# Patient Record
Sex: FEMALE | Race: WHITE | NOT HISPANIC OR LATINO | Employment: OTHER | URBAN - METROPOLITAN AREA
[De-identification: names, ages, dates, MRNs, and addresses within clinical notes are randomized per-mention and may not be internally consistent; named-entity substitution may affect disease eponyms.]

---

## 2017-01-26 ENCOUNTER — RADIANT APPOINTMENT (OUTPATIENT)
Dept: MAMMOGRAPHY | Facility: CLINIC | Age: 62
End: 2017-01-26
Attending: FAMILY MEDICINE
Payer: COMMERCIAL

## 2017-01-26 DIAGNOSIS — Z12.31 VISIT FOR SCREENING MAMMOGRAM: ICD-10-CM

## 2017-01-26 PROCEDURE — G0202 SCR MAMMO BI INCL CAD: HCPCS | Mod: TC

## 2017-06-07 ENCOUNTER — OFFICE VISIT (OUTPATIENT)
Dept: FAMILY MEDICINE | Facility: CLINIC | Age: 62
End: 2017-06-07
Payer: COMMERCIAL

## 2017-06-07 VITALS
SYSTOLIC BLOOD PRESSURE: 108 MMHG | HEIGHT: 68 IN | DIASTOLIC BLOOD PRESSURE: 64 MMHG | HEART RATE: 60 BPM | TEMPERATURE: 98.4 F | WEIGHT: 144 LBS | BODY MASS INDEX: 21.82 KG/M2

## 2017-06-07 DIAGNOSIS — C51.9 VULVAR CANCER (H): ICD-10-CM

## 2017-06-07 DIAGNOSIS — J01.90 ACUTE SINUSITIS WITH SYMPTOMS > 10 DAYS: ICD-10-CM

## 2017-06-07 DIAGNOSIS — J02.9 VIRAL PHARYNGITIS: Primary | ICD-10-CM

## 2017-06-07 LAB
DEPRECATED S PYO AG THROAT QL EIA: NORMAL
MICRO REPORT STATUS: NORMAL
SPECIMEN SOURCE: NORMAL

## 2017-06-07 PROCEDURE — 87880 STREP A ASSAY W/OPTIC: CPT | Performed by: FAMILY MEDICINE

## 2017-06-07 PROCEDURE — 87081 CULTURE SCREEN ONLY: CPT | Performed by: FAMILY MEDICINE

## 2017-06-07 PROCEDURE — 99213 OFFICE O/P EST LOW 20 MIN: CPT | Performed by: FAMILY MEDICINE

## 2017-06-07 NOTE — LETTER
[facility]  [address] [address]  Phone: [phone]    June 8, 2017    Carlachris Hong  [address] [address]              Dear Ms. Hong,      The results of your recent throat culture were negative. If you have any further questions or concerns please contact the clinic.            Sincerely,      Raquel Pablo MD

## 2017-06-07 NOTE — PROGRESS NOTES
"  SUBJECTIVE:                                                    Carla Hong is a 62 year old female who presents to clinic today for the following health issues:      Acute Illness   Acute illness concerns: sinus pressure  Onset: 5 days    Fever: no    Chills/Sweats: no    Headache (location?): YES    Sinus Pressure:YES- tender, reddened, post-nasal drainage, facial pain and teeth pain    Conjunctivitis:  no    Ear Pain: no    Rhinorrhea: YES    Congestion: YES    Sore Throat: YES     Cough: no    Wheeze: no    Decreased Appetite: no    Nausea: no    Vomiting: no    Diarrhea:  no    Dysuria/Freq.: no    Fatigue/Achiness: YES- fatigue    Sick/Strep Exposure: YES- exposure to strep and mono     Therapies Tried and outcome: dayquil        ROS:  Constitutional, HEENT, cardiovascular, pulmonary, gi and gu systems are negative, except as otherwise noted.    This document serves as a record of the services and decisions personally performed and made by Raquel Pablo MD. It was created on his behalf by Yoan Kaplan, a trained medical scribe. The creation of this document is based the provider's statements to the medical scribe.  Yoan Kaplan 3:00 PM June 7, 2017      OBJECTIVE:                                                    /64  Pulse 60  Temp 98.4  F (36.9  C) (Tympanic)  Ht 5' 8\" (1.727 m)  Wt 144 lb (65.3 kg)  LMP 03/13/2007  BMI 21.9 kg/m2  Body mass index is 21.9 kg/(m^2).       GENERAL: sick, alert and no distress  EYES: Eyes grossly normal to inspection, conjunctivae and sclerae normal  HENT: ear canals and TM's normal, nose and mouth without ulcers or lesions  RESP: lungs clear to auscultation - no rales, rhonchi or wheezes  CV: regular rate and rhythm, normal S1 S2, no murmur  MS: no gross musculoskeletal defects noted, no edema        Results for orders placed or performed in visit on 06/07/17   Strep, Rapid Screen   Result Value Ref Range    Specimen Description Throat     Rapid Strep " A Screen       NEGATIVE: No Group A streptococcal antigen detected by immunoassay, await   culture report.      Micro Report Status FINAL 06/07/2017           ASSESSMENT/PLAN:                                                      (J02.9) Viral pharyngitis  (primary encounter diagnosis)  Comment: Rapid was negative. Awaiting culture results.  Plan: Strep, Rapid Screen, Beta strep group A culture            (J01.90) Acute sinusitis with symptoms > 10 days  Comment: Hx of sinusitis. Will treat with antibiotics.   Plan: amoxicillin-clavulanate (AUGMENTIN) 875-125 MG         per tablet    (C51.9) Vulvar cancer (H)  Comment: Appears resolved.   Plan: Followed by OB/Gyn        There are no Patient Instructions on file for this visit.      Patient will follow up if symptoms worsen or do not improve. Patient instructed to call with any questions or concerns.    The information in this document, created by a scribe for me, accurately reflects the services I personally performed and the decisions made by me. I have reviewed and approved this document for accuracy. 3:01 PM 6/7/2017    Raquel Pablo MD  [facility]

## 2017-06-07 NOTE — MR AVS SNAPSHOT
"              After Visit Summary   6/7/2017    Carla Hong    MRN: [id number]           Patient Information     Date Of Birth          1955        Visit Information        Provider Department      6/7/2017 3:00 PM Raquel Pablo MD [facility]        Today's Diagnoses     Viral pharyngitis    -  1    Acute sinusitis with symptoms > 10 days        Vulvar cancer (H)           Follow-ups after your visit        Who to contact     Normal or non-critical lab and imaging results will be communicated to you by UIBLUEPRINThart, letter or phone within 4 business days after the clinic has received the results. If you do not hear from us within 7 days, please contact the clinic through Kitchont or phone. If you have a critical or abnormal lab result, we will notify you by phone as soon as possible.  Submit refill requests through [employer] or call your pharmacy and they will forward the refill request to us. Please allow 3 business days for your refill to be completed.          If you need to speak with a  for additional information , please call: [phone]           Additional Information About Your Visit        UIBLUEPRINTharLono Information     The Do[employer] gives you secure access to your electronic health record. If you see a primary care provider, you can also send messages to your care team and make appointments. If you have questions, please call your primary care clinic.  If you do not have a primary care provider, please call [phone] and they will assist you.        Care EveryWhere ID     This is your Care EveryWhere ID. This could be used by other organizations to access your [place] medical records  [id number]        Your Vitals Were     Pulse Temperature Height Last Period BMI (Body Mass Index)       60 98.4  F (36.9  C) (Tympanic) 5' 8\" (1.727 m) 03/13/2007 21.9 kg/m2        Blood Pressure from Last 3 Encounters:   06/07/17 108/64   12/12/16 135/86   12/09/16 102/60    Weight from " Last 3 Encounters:   06/07/17 144 lb (65.3 kg)   12/12/16 140 lb (63.5 kg)   12/12/16 141 lb (64 kg)              We Performed the Following     Beta strep group A culture     Strep, Rapid Screen          Today's Medication Changes          These changes are accurate as of: 6/7/17 11:59 PM.  If you have any questions, ask your nurse or doctor.               Start taking these medicines.        Dose/Directions    amoxicillin-clavulanate 875-125 MG per tablet   Commonly known as:  AUGMENTIN   Used for:  Acute sinusitis with symptoms > 10 days   Started by:  Raquel Pablo MD        Dose:  1 tablet   Take 1 tablet by mouth 2 times daily for 7 days   Quantity:  14 tablet   Refills:  0            Where to get your medicines      These medications were sent to [facility] [address] [facility] [address] [facility]  [address] [place] [address]     Phone:  [phone]     amoxicillin-clavulanate 875-125 MG per tablet                Primary Care Provider Office Phone # Fax #    Raquel Pablo [phone]       [facility] [address] [facility] [address]        Thank you!     Thank you for choosing [facility]  for your care. Our goal is always to provide you with excellent care. Hearing back from our patients is one way we can continue to improve our services. Please take a few minutes to complete the written survey that you may receive in the mail after your visit with us. Thank you!             Your Updated Medication List - Protect others around you: Learn how to safely use, store and throw away your medicines at www.disposemymeds.org.          This list is accurate as of: 6/7/17 11:59 PM.  Always use your most recent med list.                   Brand Name Dispense Instructions for use    amoxicillin-clavulanate 875-125 MG per tablet    AUGMENTIN    14 tablet    Take 1 tablet by mouth 2 times daily for 7 days       CALCIUM + D PO      1  daily       DAILY MULTIVITAMIN PO      Take 1 tablet by mouth daily.       MELATONIN PO      Take 10 mg by mouth

## 2017-06-08 LAB
BACTERIA SPEC CULT: NORMAL
MICRO REPORT STATUS: NORMAL
SPECIMEN SOURCE: NORMAL

## 2017-07-08 ENCOUNTER — HEALTH MAINTENANCE LETTER (OUTPATIENT)
Age: 62
End: 2017-07-08

## 2017-08-12 ENCOUNTER — HEALTH MAINTENANCE LETTER (OUTPATIENT)
Age: 62
End: 2017-08-12

## 2017-10-14 ENCOUNTER — HEALTH MAINTENANCE LETTER (OUTPATIENT)
Age: 62
End: 2017-10-14

## 2017-12-13 ENCOUNTER — OFFICE VISIT (OUTPATIENT)
Dept: FAMILY MEDICINE | Facility: CLINIC | Age: 62
End: 2017-12-13
Payer: COMMERCIAL

## 2017-12-13 VITALS
HEIGHT: 68 IN | HEART RATE: 65 BPM | OXYGEN SATURATION: 97 % | BODY MASS INDEX: 21.98 KG/M2 | TEMPERATURE: 98 F | DIASTOLIC BLOOD PRESSURE: 65 MMHG | WEIGHT: 145 LBS | SYSTOLIC BLOOD PRESSURE: 90 MMHG

## 2017-12-13 DIAGNOSIS — J01.90 ACUTE SINUSITIS WITH SYMPTOMS > 10 DAYS: Primary | ICD-10-CM

## 2017-12-13 DIAGNOSIS — H61.21 IMPACTED CERUMEN OF RIGHT EAR: ICD-10-CM

## 2017-12-13 PROCEDURE — 99214 OFFICE O/P EST MOD 30 MIN: CPT | Performed by: PHYSICIAN ASSISTANT

## 2017-12-13 RX ORDER — FLUTICASONE PROPIONATE 50 MCG
1-2 SPRAY, SUSPENSION (ML) NASAL DAILY
Qty: 1 BOTTLE | Refills: 0 | Status: SHIPPED | OUTPATIENT
Start: 2017-12-13 | End: 2018-11-26

## 2017-12-13 RX ORDER — OXYMETAZOLINE HYDROCHLORIDE 0.05 G/100ML
2 SPRAY NASAL 2 TIMES DAILY
Qty: 1 BOTTLE | Refills: 0 | Status: SHIPPED | OUTPATIENT
Start: 2017-12-13 | End: 2018-11-26

## 2017-12-13 RX ORDER — LEVOFLOXACIN 750 MG/1
750 TABLET, FILM COATED ORAL DAILY
Qty: 5 TABLET | Refills: 0 | Status: SHIPPED | OUTPATIENT
Start: 2017-12-13 | End: 2018-11-26

## 2017-12-13 NOTE — MR AVS SNAPSHOT
After Visit Summary   12/13/2017    Carla Hong    MRN: [id number]           Patient Information     Date Of Birth          1955        Visit Information        Provider Department      12/13/2017 1:40 PM Tamar Dimas PA-C [facility]        Today's Diagnoses     Acute sinusitis with symptoms > 10 days    -  1    Impacted cerumen of right ear           Follow-ups after your visit        Your next 10 appointments already scheduled     Jan 05, 2018  2:00 PM CST   (Arrive by 1:45 PM)   Return Visit with Henrry Granger MD   [facility]    [address]  [address]nd Floor  [place] [address] [phone]              Who to contact     Normal or non-critical lab and imaging results will be communicated to you by MyChart, letter or phone within 4 business days after the clinic has received the results. If you do not hear from us within 7 days, please contact the clinic through MyChart or phone. If you have a critical or abnormal lab result, we will notify you by phone as soon as possible.  Submit refill requests through Couple or call your pharmacy and they will forward the refill request to us. Please allow 3 business days for your refill to be completed.          If you need to speak with a  for additional information , please call: [phone]           Additional Information About Your Visit        Orchid Softwarehart Information     Couple gives you secure access to your electronic health record. If you see a primary care provider, you can also send messages to your care team and make appointments. If you have questions, please call your primary care clinic.  If you do not have a primary care provider, please call [phone] and they will assist you.        Care EveryWhere ID     This is your Care EveryWhere ID. This could be used by other organizations to access your [facility]  "records  [id number]        Your Vitals Were     Pulse Temperature Height Last Period Pulse Oximetry Breastfeeding?    65 98  F (36.7  C) (Tympanic) 5' 8\" (1.727 m) 03/13/2007 97% No    BMI (Body Mass Index)                   22.05 kg/m2            Blood Pressure from Last 3 Encounters:   12/13/17 90/65   06/07/17 108/64   12/12/16 135/86    Weight from Last 3 Encounters:   12/13/17 145 lb (65.8 kg)   06/07/17 144 lb (65.3 kg)   12/12/16 140 lb (63.5 kg)              Today, you had the following     No orders found for display         Today's Medication Changes          These changes are accurate as of: 12/13/17  1:54 PM.  If you have any questions, ask your nurse or doctor.               Start taking these medicines.        Dose/Directions    fluticasone 50 MCG/ACT spray   Commonly known as:  FLONASE   Used for:  Acute sinusitis with symptoms > 10 days   Started by:  Tamar Dimas PA-C        Dose:  1-2 spray   Spray 1-2 sprays into both nostrils daily   Quantity:  1 Bottle   Refills:  0       levofloxacin 750 MG tablet   Commonly known as:  LEVAQUIN   Used for:  Acute sinusitis with symptoms > 10 days   Started by:  Tamar Dimas PA-C        Dose:  750 mg   Take 1 tablet (750 mg) by mouth daily   Quantity:  5 tablet   Refills:  0       oxymetazoline 0.05 % spray   Commonly known as:  AFRIN   Used for:  Acute sinusitis with symptoms > 10 days   Started by:  Tamar Dimas PA-C        Dose:  2 spray   Spray 2 sprays into both nostrils 2 times daily Do not use for more than 3 consecutive days, as you may develop rebound congestion.   Quantity:  1 Bottle   Refills:  0            Where to get your medicines      These medications were sent to [facility] [address]  [address] [facility] [address]     Phone:  [phone]     fluticasone 50 MCG/ACT spray    levofloxacin 750 MG tablet    oxymetazoline 0.05 % spray                Primary Care " Provider Office Phone # Fax #    Raquel Pablo [phone] [address] [place] [facility] [address]        Equal Access to Services     KARTHIK ROCKWELL : Jessi olaf middleton sallie Glass, wabebada luqyesika, qaybta kaalmada carlos, krystian matos lageovannamary narvaez. So [facility] [phone].    ATENCIÓN: Si habla español, tiene a redd disposición servicios gratuitos de asistencia lingüística. Llame al [phone].    We comply with applicable federal civil rights laws and [place] laws. We do not discriminate on the basis of race, color, national origin, age, disability, sex, sexual orientation, or gender identity.            Thank you!     Thank you for choosing [facility]  for your care. Our goal is always to provide you with excellent care. Hearing back from our patients is one way we can continue to improve our services. Please take a few minutes to complete the written survey that you may receive in the mail after your visit with us. Thank you!             Your Updated Medication List - Protect others around you: Learn how to safely use, store and throw away your medicines at www.disposemymeds.org.          This list is accurate as of: 12/13/17  1:54 PM.  Always use your most recent med list.                   Brand Name Dispense Instructions for use Diagnosis    CALCIUM + D PO      1 daily        DAILY MULTIVITAMIN PO      Take 1 tablet by mouth daily.        fluticasone 50 MCG/ACT spray    FLONASE    1 Bottle    Spray 1-2 sprays into both nostrils daily    Acute sinusitis with symptoms > 10 days       levofloxacin 750 MG tablet    LEVAQUIN    5 tablet    Take 1 tablet (750 mg) by mouth daily    Acute sinusitis with symptoms > 10 days       MELATONIN PO      Take 10 mg by mouth        oxymetazoline 0.05 % spray    AFRIN    1 Bottle    Spray 2 sprays into both nostrils 2 times daily Do not use for more than 3 consecutive days, as you may develop rebound congestion.    Acute  sinusitis with symptoms > 10 days

## 2017-12-13 NOTE — NURSING NOTE
"Chief Complaint   Patient presents with     Sinus Problem       Initial BP 90/65  Pulse 65  Temp 98  F (36.7  C) (Tympanic)  Ht 5' 8\" (1.727 m)  Wt 145 lb (65.8 kg)  LMP 03/13/2007  SpO2 97%  Breastfeeding? No  BMI 22.05 kg/m2 Estimated body mass index is 22.05 kg/(m^2) as calculated from the following:    Height as of this encounter: 5' 8\" (1.727 m).    Weight as of this encounter: 145 lb (65.8 kg).  Medication Reconciliation: complete     Judy Jorge MA      "

## 2017-12-13 NOTE — PROGRESS NOTES
SUBJECTIVE:   Carla Hong is a 62 year old female who presents to clinic today for the following health issues:      ENT Symptoms             Symptoms: cc Present Absent Comment   Fever/Chills   x    Fatigue  x     Muscle Aches  x     Eye Irritation  x     Sneezing  x     Nasal Maximiliano/Drg  x     Sinus Pressure/Pain  x     Loss of smell   x    Dental pain  x     Sore Throat  x     Swollen Glands   x    Ear Pain/Fullness   x    Cough  x     Wheeze  x     Chest Pain   x    Shortness of breath  x     Rash   x    Other   x      Symptom duration:  3 weeks    Symptom severity:  worsening.    Treatments tried:  Augmentin, claritin    Contacts:   is ill        Finished full augmentin course about 5 days ago.  Symptoms are now returning and she c/o sinus pressure and POST NASAL DRIP.  Has tried hot showers and vaporizers.    She has tried flonase a few times, nothing consistent.  Pain is in frontal sinuses and pain in upper jaw.              Problem list and histories reviewed & adjusted, as indicated.  Additional history: as documented    Patient Active Problem List   Diagnosis     Chronic rhinitis     CARDIOVASCULAR SCREENING; LDL GOAL LESS THAN 160     Dysphonia     Other diseases of vocal cords     Adjustment disorder with depressed mood     Vulvar lump     Postmenopausal bleeding     Annual physical exam     Vulvar cancer (H)     Cellulitis of groin, left     Cellulitis     Ptosis of breast     History of abnormal Pap smear     Advanced directives, counseling/discussion     Past Surgical History:   Procedure Laterality Date     COLONOSCOPY  12/6/2010    COLONOSCOPY performed by RYAN OSEGUERA at [facility]     VULVECTOMY RADICAL DISSECT GROIN(S)  11/16/2012    Procedure: VULVECTOMY RADICAL DISSECT GROIN(S);  Radical Vulvectomy Bilateral Inginal Femoral Lymph Node Dissection.;  Surgeon: Henrry Granger MD;  Location:  OR       Social History   Substance Use Topics     Smoking status: Never Smoker      "Smokeless tobacco: Never Used     Alcohol use Yes      Comment: 1-2xweek     Family History   Problem Relation Age of Onset     DIABETES Maternal Grandmother      Hypertension No family hx of      CEREBROVASCULAR DISEASE No family hx of      Breast Cancer No family hx of      Cancer - colorectal No family hx of      Prostate Cancer No family hx of      C.A.D. No family hx of      CANCER Son      brain cancer/germinoma     CANCER Mother      renal cell carcinoma         Current Outpatient Prescriptions   Medication Sig Dispense Refill     levofloxacin (LEVAQUIN) 750 MG tablet Take 1 tablet (750 mg) by mouth daily 5 tablet 0     oxymetazoline (AFRIN) 0.05 % spray Spray 2 sprays into both nostrils 2 times daily Do not use for more than 3 consecutive days, as you may develop rebound congestion. 1 Bottle 0     fluticasone (FLONASE) 50 MCG/ACT spray Spray 1-2 sprays into both nostrils daily 1 Bottle 0     MELATONIN PO Take 10 mg by mouth       Multiple Vitamin (DAILY MULTIVITAMIN PO) Take 1 tablet by mouth daily.       CALCIUM + D PO 1 daily       BP Readings from Last 3 Encounters:   12/13/17 90/65   06/07/17 108/64   12/12/16 135/86    Wt Readings from Last 3 Encounters:   12/13/17 145 lb (65.8 kg)   06/07/17 144 lb (65.3 kg)   12/12/16 140 lb (63.5 kg)                        Reviewed and updated as needed this visit by clinical staff     Reviewed and updated as needed this visit by Provider         ROS:  Constitutional, HEENT, cardiovascular, pulmonary, GI, , musculoskeletal, neuro, skin, endocrine and psych systems are negative, except as otherwise noted.      OBJECTIVE:   BP 90/65  Pulse 65  Temp 98  F (36.7  C) (Tympanic)  Ht 5' 8\" (1.727 m)  Wt 145 lb (65.8 kg)  LMP 03/13/2007  SpO2 97%  Breastfeeding? No  BMI 22.05 kg/m2  Body mass index is 22.05 kg/(m^2).  GENERAL: healthy, alert and no distress  EYES: Eyes grossly normal to inspection, PERRL and conjunctivae and sclerae normal  HENT: ear canal on right " with impacted cerumen, left clear and TM's normal, nose and mouth without ulcers or lesions  NECK: no adenopathy, no asymmetry, masses, or scars and thyroid normal to palpation  RESP: lungs clear to auscultation - no rales, rhonchi or wheezes  CV: regular rate and rhythm, normal S1 S2, no S3 or S4, no murmur, click or rub, no peripheral edema and peripheral pulses strong  ABDOMEN: soft, nontender, no hepatosplenomegaly, no masses and bowel sounds normal  MS: no gross musculoskeletal defects noted, no edema    Diagnostic Test Results:  none     ASSESSMENT/PLAN:       1. Acute sinusitis with symptoms > 10 days  SINUSITIS    * Antibiotics completed, contingency script given to start if sinus pressure does not improve with flonase and afrin.     * I discussed the pathophysiology of sinus pressure/sinusitis and treatment options with emphasis on healthy lifestyle and opening up natural drainage passages.  I discussed the risks and benefits of various over the counter and prescription treatments including short courses of decongestant nasal sprays.  If new, worsening or persistent symptoms, the patient is to call or return for a recheck.        - levofloxacin (LEVAQUIN) 750 MG tablet; Take 1 tablet (750 mg) by mouth daily  Dispense: 5 tablet; Refill: 0  - oxymetazoline (AFRIN) 0.05 % spray; Spray 2 sprays into both nostrils 2 times daily Do not use for more than 3 consecutive days, as you may develop rebound congestion.  Dispense: 1 Bottle; Refill: 0  - fluticasone (FLONASE) 50 MCG/ACT spray; Spray 1-2 sprays into both nostrils daily  Dispense: 1 Bottle; Refill: 0    2. Impacted cerumen of right ear  Cerumen Impaction    right ear irrigated however unable to remove wax.  Curette then used to try and remove, still unable to reach it.      Tympanic membranes intact s/p irrigation.   Can use OTC debrox to right ear and f/u for another irrigation next week once wax is softened.                FUTURE APPOINTMENTS:       -  Follow-up visit if symptoms worsen or fail to improve as anticipated.     Tamar Dimas PA-C  [facility]

## 2018-01-05 ENCOUNTER — ONCOLOGY VISIT (OUTPATIENT)
Dept: ONCOLOGY | Facility: CLINIC | Age: 63
End: 2018-01-05
Attending: OBSTETRICS & GYNECOLOGY
Payer: COMMERCIAL

## 2018-01-05 VITALS
DIASTOLIC BLOOD PRESSURE: 77 MMHG | WEIGHT: 145 LBS | RESPIRATION RATE: 17 BRPM | BODY MASS INDEX: 21.98 KG/M2 | SYSTOLIC BLOOD PRESSURE: 112 MMHG | TEMPERATURE: 98.2 F | HEIGHT: 68 IN | OXYGEN SATURATION: 97 % | HEART RATE: 61 BPM

## 2018-01-05 DIAGNOSIS — L03.314 CELLULITIS OF GROIN: Primary | ICD-10-CM

## 2018-01-05 DIAGNOSIS — C51.9 VULVAR CANCER (H): ICD-10-CM

## 2018-01-05 PROCEDURE — 99213 OFFICE O/P EST LOW 20 MIN: CPT | Mod: ZP | Performed by: OBSTETRICS & GYNECOLOGY

## 2018-01-05 PROCEDURE — G0463 HOSPITAL OUTPT CLINIC VISIT: HCPCS | Mod: ZF

## 2018-01-05 RX ORDER — CEPHALEXIN 500 MG/1
500 CAPSULE ORAL 4 TIMES DAILY
Qty: 40 CAPSULE | Refills: 3 | Status: SHIPPED | OUTPATIENT
Start: 2018-01-05 | End: 2018-11-26

## 2018-01-05 ASSESSMENT — PAIN SCALES - GENERAL: PAINLEVEL: NO PAIN (0)

## 2018-01-05 NOTE — MR AVS SNAPSHOT
"              After Visit Summary   1/5/2018    Carla Hong    MRN: [id number]           Patient Information     Date Of Birth          1955        Visit Information        Provider Department      1/5/2018 2:00 PM Henrry Granger MD [facility]        Today's Diagnoses     Cellulitis of groin    -  1    Vulvar cancer (H)           Follow-ups after your visit        Who to contact     If you have questions or need follow up information about today's clinic visit or your schedule please contact [facility] directly at [phone].  Normal or non-critical lab and imaging results will be communicated to you by "[employer]hart, letter or phone within 4 business days after the clinic has received the results. If you do not hear from us within 7 days, please contact the clinic through [employer] or phone. If you have a critical or abnormal lab result, we will notify you by phone as soon as possible.  Submit refill requests through [employer] or call your pharmacy and they will forward the refill request to us. Please allow 3 business days for your refill to be completed.          Additional Information About Your Visit        MyChart Information     [employer] gives you secure access to your electronic health record. If you see a primary care provider, you can also send messages to your care team and make appointments. If you have questions, please call your primary care clinic.  If you do not have a primary care provider, please call [phone] and they will assist you.        Care EveryWhere ID     This is your Care EveryWhere ID. This could be used by other organizations to access your [place] medical records  [id number]        Your Vitals Were     Pulse Temperature Respirations Height Last Period Pulse Oximetry    61 98.2  F (36.8  C) (Oral) 17 1.727 m (5' 8\") 03/13/2007 97%    Breastfeeding? BMI (Body Mass Index)                No 22.05 kg/m2           Blood Pressure from Last 3 " Encounters:   01/05/18 112/77   12/13/17 90/65   06/07/17 108/64    Weight from Last 3 Encounters:   01/05/18 65.8 kg (145 lb)   12/13/17 65.8 kg (145 lb)   06/07/17 65.3 kg (144 lb)              Today, you had the following     No orders found for display         Today's Medication Changes          These changes are accurate as of 1/5/18 11:59 PM.  If you have any questions, ask your nurse or doctor.               Start taking these medicines.        Dose/Directions    cephALEXin 500 MG capsule   Commonly known as:  KEFLEX   Used for:  Cellulitis of groin   Started by:  Henrry Granger MD        Dose:  500 mg   Take 1 capsule (500 mg) by mouth 4 times daily   Quantity:  40 capsule   Refills:  3            Where to get your medicines      These medications were sent to [facility] [address]  [address] [address]     Phone:  [phone]     cephALEXin 500 MG capsule                Primary Care Provider Office Phone # Fax #    Raquel Pablo [phone] [address] [place] [facility] [address]        Equal Access to Services     [facility] AH: Hadii aad kane hadasho Soeugenio, waaxda luqadaha, qaybta kaalmada adeegyada, krystian narvaez. So [facility] [phone].    ATENCIÓN: Si habla español, tiene a redd disposición servicios gratuitos de asistencia lingüística. Llame al [phone].    We comply with applicable federal civil rights laws and [place] laws. We do not discriminate on the basis of race, color, national origin, age, disability, sex, sexual orientation, or gender identity.            Thank you!     Thank you for choosing [facility]  for your care. Our goal is always to provide you with excellent care. Hearing back from our patients is one way we can continue to improve our services. Please take a few minutes to complete the written survey that you may receive in the mail after  your visit with us. Thank you!             Your Updated Medication List - Protect others around you: Learn how to safely use, store and throw away your medicines at www.disposemymeds.org.          This list is accurate as of 1/5/18 11:59 PM.  Always use your most recent med list.                   Brand Name Dispense Instructions for use Diagnosis    CALCIUM + D PO      1 daily        cephALEXin 500 MG capsule    KEFLEX    40 capsule    Take 1 capsule (500 mg) by mouth 4 times daily    Cellulitis of groin       DAILY MULTIVITAMIN PO      Take 1 tablet by mouth daily.        fluticasone 50 MCG/ACT spray    FLONASE    1 Bottle    Spray 1-2 sprays into both nostrils daily    Acute sinusitis with symptoms > 10 days       levofloxacin 750 MG tablet    LEVAQUIN    5 tablet    Take 1 tablet (750 mg) by mouth daily    Acute sinusitis with symptoms > 10 days       MELATONIN PO      Take 10 mg by mouth        oxymetazoline 0.05 % spray    AFRIN    1 Bottle    Spray 2 sprays into both nostrils 2 times daily Do not use for more than 3 consecutive days, as you may develop rebound congestion.    Acute sinusitis with symptoms > 10 days

## 2018-01-05 NOTE — LETTER
2018       RE: Carla Hong  [address] [id number]     Dear Colleague,    Thank you for referring your patient, Carla Hong, to the [facility]. Please see a copy of my visit note below.    Gynecologic Oncology Clinic - Follow Up Visit    Date: 2018     RE: Carla Hong  : 1955      Carla Hong, 64 yo, diagnosed with Stage IB vulvar squamous cell carcinoma in . S/p radical vulvectomy and inguinal lymphnode dissection the same year. In the jacob-operative period she developed lymphadenopathy that progressed to groin cellulitis on multiple occasions. Since then she has learned to recognized early signs of infection and has successfully treated episodes with antibiotics outpatient. She presents for end of surveillance visit.  She has no complaints and has had no further episodes of cellulitis.  No new concerns at vulva and no new diagnoses    GYN History  Last Pap Smear:            Result: NILN, She has no history of abnormal Pap smears.  Vulvar biopsy:        Result: lichen sclerosis          Review of Systems:  Systemic  no weight changes; no fever; no chills; no night sweats; no appetite changes  Skin   no rashes, or lesions  Eye   no irritation; no changes in vision  HEENT no dysphagia; no hoarseness   Pulmonary no cough; no shortness of breath  CV  no chest pain; no palpitations  GI  no diarrhea; no abdominal pain; no changes in bowel  habits; no blood in stool    no urinary frequency; no urinary urgency; no dysuria; no pain; no abnormal vaginal discharge;   no abnormal vaginal bleeding  Breast  no breast discharge; no breast changes; no breast pain  MSK  no myalgias; no arthralgias; no back pain  Psychiatric  no depressed mood; no anxiety  Hematologic no tender lymph nodes; no noticeable swellings or lumps   Endocrine  no hot flashes; no heat/cold intolerance       Neurological no tremor; no numbness and tingling; no  headaches; no difficulty sleeping    Past Medical History:  Past Medical History:   Diagnosis Date     Vulvar cancer (H) 11/12       Past Surgical History:  Past Surgical History:   Procedure Laterality Date     COLONOSCOPY  12/6/2010    COLONOSCOPY performed by RYAN OSEGUERA at [facility]     VULVECTOMY RADICAL DISSECT GROIN(S)  11/16/2012    Procedure: VULVECTOMY RADICAL DISSECT GROIN(S);  Radical Vulvectomy Bilateral Inginal Femoral Lymph Node Dissection.;  Surgeon: Henrry Granger MD;  Location:  OR       Health Maintenance:  Health Maintenance Due   Topic Date Due     HEPATITIS C SCREENING  02/07/1974     PAP Q3 MO DIAGNOSTIC  03/12/2017     PAP Q6 MOS DIAGNOSTIC  06/12/2017     INFLUENZA VACCINE (SYSTEM ASSIGNED)  09/01/2017     Current Medications:   Current Outpatient Prescriptions   Medication Sig Dispense Refill     cephALEXin (KEFLEX) 500 MG capsule Take 1 capsule (500 mg) by mouth 4 times daily 40 capsule 3     MELATONIN PO Take 10 mg by mouth       Multiple Vitamin (DAILY MULTIVITAMIN PO) Take 1 tablet by mouth daily.       CALCIUM + D PO 1 daily       levofloxacin (LEVAQUIN) 750 MG tablet Take 1 tablet (750 mg) by mouth daily (Patient not taking: Reported on 1/5/2018) 5 tablet 0     oxymetazoline (AFRIN) 0.05 % spray Spray 2 sprays into both nostrils 2 times daily Do not use for more than 3 consecutive days, as you may develop rebound congestion. (Patient not taking: Reported on 1/5/2018) 1 Bottle 0     fluticasone (FLONASE) 50 MCG/ACT spray Spray 1-2 sprays into both nostrils daily (Patient not taking: Reported on 1/5/2018) 1 Bottle 0       Allergies:   Allergies   Allergen Reactions     Nkda [No Known Drug Allergies]         Social History:     Social History   Substance Use Topics     Smoking status: Never Smoker     Smokeless tobacco: Never Used     Alcohol use Yes      Comment: 1-2xweek       History   Drug Use No       Family History: Notable for  Family History   Problem Relation Age of  "Onset     DIABETES Maternal Grandmother      Hypertension No family hx of      CEREBROVASCULAR DISEASE No family hx of      Breast Cancer No family hx of      Cancer - colorectal No family hx of      Prostate Cancer No family hx of      C.A.D. No family hx of      CANCER Son      brain cancer/germinoma     CANCER Mother      renal cell carcinoma       Physical Exam:   /77  Pulse 61  Temp 98.2  F (36.8  C) (Oral)  Resp 17  Ht 1.727 m (5' 8\")  Wt 65.8 kg (145 lb)  LMP 03/13/2007  SpO2 97%  Breastfeeding? No  BMI 22.05 kg/m2  Body mass index is 22.05 kg/(m^2).     General :  healthy and alert, no distress  Cardiovascular: regular rate and rhythm, no gallops, rubs or murmurs   Respiratory:  lungs clear, no rales, rhonchi or wheezes, normal diaphragmatic excursion  Musculoskeletal: extremities non tender and without edema  Skin:   no lesions or rashes   Neurological:  normal gait, no gross defects  Psychiatric:  appropriate mood and affect                      Hematological: normal cervical, supraclavicular and inguinal lymph nodes  Gastrointestinal:       abdomen soft, non-tender, non-distended, no organomegaly or masses  Genitourinary: Consistent with prior vulvar surgery.  No lesions or ulceration or mass.  Vaginal mucosa is pink and smooth.  Normal bimanual examination without mass.      Assessment: Carla Hong is a 63 year old woman diagnosed with squamous cell carcinoma of the vulva in 2012, s/p radical vulvectomy and inguinal lymphnode dissection. In the perioperative period she developed lymphedema that lead to groin cellulitis.     No evidence of recurrence    Now at end of surveillance and patient will transition to annual examination at our [facility] or local gynecologist.    Continue with antibiotics on hand in case of symptoms of cellulitis.    Henrry Granger Jr., M.D., M.S.  Professor, Gynecologic Oncology  Obstetrics, Gynecology and Women's Health   [employer] [facility] [employer]  Chief Medical Officer  [facility]

## 2018-01-05 NOTE — NURSING NOTE
"Oncology Rooming Note    January 5, 2018 2:02 PM   Carla Hong is a 62 year old female who presents for:    Chief Complaint   Patient presents with     Oncology Clinic Visit     return patient visit for follow up related to squamous cell carcinoma - vulvar     Initial Vitals: /77  Pulse 61  Temp 98.2  F (36.8  C) (Oral)  Resp 17  Ht 1.727 m (5' 8\")  Wt 65.8 kg (145 lb)  LMP 03/13/2007  SpO2 97%  Breastfeeding? No  BMI 22.05 kg/m2 Estimated body mass index is 22.05 kg/(m^2) as calculated from the following:    Height as of this encounter: 1.727 m (5' 8\").    Weight as of this encounter: 65.8 kg (145 lb). Body surface area is 1.78 meters squared.  No Pain (0) Comment: Data Unavailable   Patient's last menstrual period was 03/13/2007.  Allergies reviewed: Yes  Medications reviewed: Yes    Medications: Medication refills not needed today.  Pharmacy name entered into [facility]:    [facility] - [place], [place] - [address] [place] [address] PHARMACY [facility] - [place], [place] - [address] [facility] - [place], [place] - [address] [place] [address] [phone]    Clinical concerns: no concerns dr. larsen was notified.    6 minutes for nursing intake (face to face time)     Magdalena Fitzgerald CMA              "

## 2018-01-20 ENCOUNTER — HEALTH MAINTENANCE LETTER (OUTPATIENT)
Age: 63
End: 2018-01-20

## 2018-02-01 ENCOUNTER — TELEPHONE (OUTPATIENT)
Dept: OBGYN | Facility: CLINIC | Age: 63
End: 2018-02-01

## 2018-02-17 ENCOUNTER — HEALTH MAINTENANCE LETTER (OUTPATIENT)
Age: 63
End: 2018-02-17

## 2018-02-28 NOTE — PROGRESS NOTES
Gynecologic Oncology Clinic - Follow Up Visit    Date: 2018     RE: Carla Hong  : 1955      Carla Hong, 62 yo, diagnosed with Stage IB vulvar squamous cell carcinoma in . S/p radical vulvectomy and inguinal lymphnode dissection the same year. In the jacob-operative period she developed lymphadenopathy that progressed to groin cellulitis on multiple occasions. Since then she has learned to recognized early signs of infection and has successfully treated episodes with antibiotics outpatient. She presents for end of surveillance visit.  She has no complaints and has had no further episodes of cellulitis.  No new concerns at vulva and no new diagnoses    GYN History  Last Pap Smear:            Result: NILN, She has no history of abnormal Pap smears.  Vulvar biopsy:        Result: lichen sclerosis          Review of Systems:  Systemic  no weight changes; no fever; no chills; no night sweats; no appetite changes  Skin   no rashes, or lesions  Eye   no irritation; no changes in vision  HEENT no dysphagia; no hoarseness   Pulmonary no cough; no shortness of breath  CV  no chest pain; no palpitations  GI  no diarrhea; no abdominal pain; no changes in bowel  habits; no blood in stool    no urinary frequency; no urinary urgency; no dysuria; no pain; no abnormal vaginal discharge;   no abnormal vaginal bleeding  Breast  no breast discharge; no breast changes; no breast pain  MSK  no myalgias; no arthralgias; no back pain  Psychiatric  no depressed mood; no anxiety  Hematologic no tender lymph nodes; no noticeable swellings or lumps   Endocrine  no hot flashes; no heat/cold intolerance       Neurological no tremor; no numbness and tingling; no headaches; no difficulty sleeping    Past Medical History:  Past Medical History:   Diagnosis Date     Vulvar cancer (H)        Past Surgical History:  Past Surgical History:   Procedure Laterality Date     COLONOSCOPY  2010    COLONOSCOPY  performed by RYAN OSEGUERA at [facility]     VULVECTOMY RADICAL DISSECT GROIN(S)  11/16/2012    Procedure: VULVECTOMY RADICAL DISSECT GROIN(S);  Radical Vulvectomy Bilateral Inginal Femoral Lymph Node Dissection.;  Surgeon: Henrry Granger MD;  Location:  OR       Health Maintenance:  Health Maintenance Due   Topic Date Due     HEPATITIS C SCREENING  02/07/1974     PAP Q3 MO DIAGNOSTIC  03/12/2017     PAP Q6 MOS DIAGNOSTIC  06/12/2017     INFLUENZA VACCINE (SYSTEM ASSIGNED)  09/01/2017     Current Medications:   Current Outpatient Prescriptions   Medication Sig Dispense Refill     cephALEXin (KEFLEX) 500 MG capsule Take 1 capsule (500 mg) by mouth 4 times daily 40 capsule 3     MELATONIN PO Take 10 mg by mouth       Multiple Vitamin (DAILY MULTIVITAMIN PO) Take 1 tablet by mouth daily.       CALCIUM + D PO 1 daily       levofloxacin (LEVAQUIN) 750 MG tablet Take 1 tablet (750 mg) by mouth daily (Patient not taking: Reported on 1/5/2018) 5 tablet 0     oxymetazoline (AFRIN) 0.05 % spray Spray 2 sprays into both nostrils 2 times daily Do not use for more than 3 consecutive days, as you may develop rebound congestion. (Patient not taking: Reported on 1/5/2018) 1 Bottle 0     fluticasone (FLONASE) 50 MCG/ACT spray Spray 1-2 sprays into both nostrils daily (Patient not taking: Reported on 1/5/2018) 1 Bottle 0       Allergies:   Allergies   Allergen Reactions     Nkda [No Known Drug Allergies]         Social History:     Social History   Substance Use Topics     Smoking status: Never Smoker     Smokeless tobacco: Never Used     Alcohol use Yes      Comment: 1-2xweek       History   Drug Use No       Family History: Notable for  Family History   Problem Relation Age of Onset     DIABETES Maternal Grandmother      Hypertension No family hx of      CEREBROVASCULAR DISEASE No family hx of      Breast Cancer No family hx of      Cancer - colorectal No family hx of      Prostate Cancer No family hx of      C.A.D. No family  "hx of      CANCER Son      brain cancer/germinoma     CANCER Mother      renal cell carcinoma       Physical Exam:   /77  Pulse 61  Temp 98.2  F (36.8  C) (Oral)  Resp 17  Ht 1.727 m (5' 8\")  Wt 65.8 kg (145 lb)  LMP 03/13/2007  SpO2 97%  Breastfeeding? No  BMI 22.05 kg/m2  Body mass index is 22.05 kg/(m^2).     General :  healthy and alert, no distress  Cardiovascular: regular rate and rhythm, no gallops, rubs or murmurs   Respiratory:  lungs clear, no rales, rhonchi or wheezes, normal diaphragmatic excursion  Musculoskeletal: extremities non tender and without edema  Skin:   no lesions or rashes   Neurological:  normal gait, no gross defects  Psychiatric:  appropriate mood and affect                      Hematological: normal cervical, supraclavicular and inguinal lymph nodes  Gastrointestinal:       abdomen soft, non-tender, non-distended, no organomegaly or masses  Genitourinary: Consistent with prior vulvar surgery.  No lesions or ulceration or mass.  Vaginal mucosa is pink and smooth.  Normal bimanual examination without mass.      Assessment: Carla Hong is a 63 year old woman diagnosed with squamous cell carcinoma of the vulva in 2012, s/p radical vulvectomy and inguinal lymphnode dissection. In the perioperative period she developed lymphedema that lead to groin cellulitis.     No evidence of recurrence    Now at end of surveillance and patient will transition to annual examination at our [facility] or local gynecologist.    Continue with antibiotics on hand in case of symptoms of cellulitis.    Henrry Granger Jr., M.D., M.S.  Professor, Gynecologic Oncology  Obstetrics, Gynecology and Women's Health   [employer] [facility] [employer]  Chief Medical Officer  [facility]     "

## 2018-04-22 ENCOUNTER — HEALTH MAINTENANCE LETTER (OUTPATIENT)
Age: 63
End: 2018-04-22

## 2018-06-18 ENCOUNTER — OFFICE VISIT (OUTPATIENT)
Dept: PODIATRY | Facility: CLINIC | Age: 63
End: 2018-06-18
Payer: COMMERCIAL

## 2018-06-18 VITALS
SYSTOLIC BLOOD PRESSURE: 117 MMHG | WEIGHT: 145 LBS | HEART RATE: 70 BPM | DIASTOLIC BLOOD PRESSURE: 79 MMHG | BODY MASS INDEX: 21.98 KG/M2 | HEIGHT: 68 IN

## 2018-06-18 DIAGNOSIS — M20.11 HALLUX VALGUS (ACQUIRED), RIGHT FOOT: ICD-10-CM

## 2018-06-18 DIAGNOSIS — M79.671 RIGHT FOOT PAIN: Primary | ICD-10-CM

## 2018-06-18 PROCEDURE — 99214 OFFICE O/P EST MOD 30 MIN: CPT | Performed by: PODIATRIST

## 2018-06-18 NOTE — MR AVS SNAPSHOT
After Visit Summary   6/18/2018    Carla Hong    MRN: [id number]           Patient Information     Date Of Birth          1955        Visit Information        Provider Department      6/18/2018 3:00 PM Saqib Interiano DPM [facility] [place]        Today's Diagnoses     Right foot pain    -  1    Hallux valgus (acquired), right foot          Care Instructions    You have elected to proceed with Surgery for a Lapidus bunionectomy on the right foot  Surgeries are performed on Tuesdays at [facility].   To schedule your surgery date please call [phone].  Please leave a message with a good time for our staff to call you back.    - Please have a date in mind for your surgery, you can feel free to leave that date on the message, and we will schedule and call back to confirm.     You can expect receive a call back the same day or on the next business day from Dr. Interiano s team to assist in the scheduling.   - We will schedule the date of your surgery.  The time will be determined a few days ahead of time.  You can expect a call from Same Day Surgery 2-3 days ahead of time with specific instructions for what time to arrive at the hospital as well as any other preparations you should take prior to surgery.    - You may need to obtain a pre-operative physical from your primary medical provider. This must be done within 30 days of your surgery date.    - We will also schedule your first post-operative appointment for a bandage and wound check for the Monday following your surgery at the [place] location.    - You may be non-weight bearing for a period of up to 6 weeks.  Options for this include: (Please indicate which you would prefer so we can provide you with an order and instructions)  o Crutches  o Walker  o Roll-a-bout knee walker.    - If you will need paperwork filled out for your employer you may drop those off at the clinic directly or you may  "have those faxed to us at [phone].  Please indicate on the form the date you would like the FMLA to begin if it will not be your surgery date.    The forms are typically filled out for up to 12 weeks, however you may be cleared to return prior to that time depending on your individual healing and job requirements.              Follow-ups after your visit        Who to contact     If you have questions or need follow up information about today's clinic visit or your schedule please contact [facility] [place] directly at [phone].  Normal or non-critical lab and imaging results will be communicated to you by [employer], letter or phone within 4 business days after the clinic has received the results. If you do not hear from us within 7 days, please contact the clinic through [employer] or phone. If you have a critical or abnormal lab result, we will notify you by phone as soon as possible.  Submit refill requests through [employer] or call your pharmacy and they will forward the refill request to us. Please allow 3 business days for your refill to be completed.          Additional Information About Your Visit        MyChart Information     [employer] gives you secure access to your electronic health record. If you see a primary care provider, you can also send messages to your care team and make appointments. If you have questions, please call your primary care clinic.  If you do not have a primary care provider, please call [phone] and they will assist you.        Care EveryWhere ID     This is your Care EveryWhere ID. This could be used by other organizations to access your [facility] medical records  [id number]        Your Vitals Were     Pulse Height Last Period BMI (Body Mass Index)          70 5' 8\" (1.727 m) 03/13/2007 22.05 kg/m2         Blood Pressure from Last 3 Encounters:   06/18/18 117/79   01/05/18 112/77   12/13/17 90/65    Weight from Last 3 Encounters:   06/18/18 145 lb " (65.8 kg)   01/05/18 145 lb (65.8 kg)   12/13/17 145 lb (65.8 kg)              Today, you had the following     No orders found for display       Primary Care Provider Office Phone # Fax #    Raquel Pablo [phone] [address] [place] [address]        Equal Access to Services     [facility]: Hadii aad ku hadasho Soomaali, waaxda luqadaha, qaybta kaalmada adeegyada, waxay idiin hayaan adeeg kharash la'aan ah. So [facility] [phone].    ATENCIÓN: Si habla español, tiene a redd disposición servicios gratuitos de asistencia lingüística. Kimame al [phone].    We comply with applicable federal civil rights laws and [place] laws. We do not discriminate on the basis of race, color, national origin, age, disability, sex, sexual orientation, or gender identity.            Thank you!     Thank you for choosing [facility]  for your care. Our goal is always to provide you with excellent care. Hearing back from our patients is one way we can continue to improve our services. Please take a few minutes to complete the written survey that you may receive in the mail after your visit with us. Thank you!             Your Updated Medication List - Protect others around you: Learn how to safely use, store and throw away your medicines at www.disposemymeds.org.          This list is accurate as of 6/18/18  3:09 PM.  Always use your most recent med list.                   Brand Name Dispense Instructions for use Diagnosis    CALCIUM + D PO      1 daily        cephALEXin 500 MG capsule    KEFLEX    40 capsule    Take 1 capsule (500 mg) by mouth 4 times daily    Cellulitis of groin       DAILY MULTIVITAMIN PO      Take 1 tablet by mouth daily.        fluticasone 50 MCG/ACT spray    FLONASE    1 Bottle    Spray 1-2 sprays into both nostrils daily    Acute sinusitis with symptoms > 10 days       levofloxacin 750 MG tablet    LEVAQUIN    5 tablet    Take 1 tablet (750  mg) by mouth daily    Acute sinusitis with symptoms > 10 days       MELATONIN PO      Take 10 mg by mouth        oxymetazoline 0.05 % spray    AFRIN    1 Bottle    Spray 2 sprays into both nostrils 2 times daily Do not use for more than 3 consecutive days, as you may develop rebound congestion.    Acute sinusitis with symptoms > 10 days

## 2018-06-18 NOTE — PATIENT INSTRUCTIONS
You have elected to proceed with Surgery for a Lapidus bunionectomy on the right foot  Surgeries are performed on Tuesdays at [facility].   To schedule your surgery date please call [phone].  Please leave a message with a good time for our staff to call you back.    - Please have a date in mind for your surgery, you can feel free to leave that date on the message, and we will schedule and call back to confirm.     You can expect receive a call back the same day or on the next business day from Dr. Interiano s team to assist in the scheduling.   - We will schedule the date of your surgery.  The time will be determined a few days ahead of time.  You can expect a call from Same Day Surgery 2-3 days ahead of time with specific instructions for what time to arrive at the hospital as well as any other preparations you should take prior to surgery.    - You may need to obtain a pre-operative physical from your primary medical provider. This must be done within 30 days of your surgery date.    - We will also schedule your first post-operative appointment for a bandage and wound check for the Monday following your surgery at the [facility].    - You may be non-weight bearing for a period of up to 6 weeks.  Options for this include: (Please indicate which you would prefer so we can provide you with an order and instructions)  o Crutches  o Walker  o Roll-a-bout knee walker.    - If you will need paperwork filled out for your employer you may drop those off at the clinic directly or you may have those faxed to us at [phone].  Please indicate on the form the date you would like the [place] to begin if it will not be your surgery date.    The forms are typically filled out for up to 12 weeks, however you may be cleared to return prior to that time depending on your individual healing and job requirements.

## 2018-06-18 NOTE — LETTER
6/18/2018         RE: Carla Hong  [address] [address]        Dear Colleague,    Thank you for referring your patient, Carla Hong, to the [facility] [place]. Please see a copy of my visit note below.    PATIENT HISTORY:  Carla Hong is a 63 year old female who presents to clinic for a painful right foot .  The patient describes the pain as sharp aching.  The patient relates the pain level is moderate.  The patient relates pain is located over the big toe joint on the right foot.  The patient relates the pain has been present for the past several months.  The patient relates pain with ambulation.  The patient has tried wider shoes with little relief.  The patient is inquiring about foot surgery.      REVIEW OF SYSTEMS:  Constitutional, HEENT, cardiovascular, pulmonary, GI, , musculoskeletal, neuro, skin, endocrine and psych systems are negative, except as otherwise noted.     PAST MEDICAL HISTORY:   Past Medical History:   Diagnosis Date     Vulvar cancer (H) 11/12        PAST SURGICAL HISTORY:   Past Surgical History:   Procedure Laterality Date     COLONOSCOPY  12/6/2010    COLONOSCOPY performed by RYAN OSEGUERA at [facility]     VULVECTOMY RADICAL DISSECT GROIN(S)  11/16/2012    Procedure: VULVECTOMY RADICAL DISSECT GROIN(S);  Radical Vulvectomy Bilateral Inginal Femoral Lymph Node Dissection.;  Surgeon: Henrry Granger MD;  Location:  OR        MEDICATIONS:   Current Outpatient Prescriptions:      CALCIUM + D PO, 1 daily, Disp: , Rfl:      MELATONIN PO, Take 10 mg by mouth, Disp: , Rfl:      Multiple Vitamin (DAILY MULTIVITAMIN PO), Take 1 tablet by mouth daily., Disp: , Rfl:      cephALEXin (KEFLEX) 500 MG capsule, Take 1 capsule (500 mg) by mouth 4 times daily (Patient not taking: Reported on 6/18/2018), Disp: 40 capsule, Rfl: 3     fluticasone (FLONASE) 50 MCG/ACT spray, Spray 1-2 sprays into both nostrils daily (Patient not taking:  "Reported on 1/5/2018), Disp: 1 Bottle, Rfl: 0     levofloxacin (LEVAQUIN) 750 MG tablet, Take 1 tablet (750 mg) by mouth daily (Patient not taking: Reported on 1/5/2018), Disp: 5 tablet, Rfl: 0     oxymetazoline (AFRIN) 0.05 % spray, Spray 2 sprays into both nostrils 2 times daily Do not use for more than 3 consecutive days, as you may develop rebound congestion. (Patient not taking: Reported on 1/5/2018), Disp: 1 Bottle, Rfl: 0  No current facility-administered medications for this visit.     Facility-Administered Medications Ordered in Other Visits:      sodium chloride (PF) 0.9% PF flush 70 mL, 70 mL, Intravenous, Once, Elin Romano APRN CNP     ALLERGIES:    Allergies   Allergen Reactions     Nkda [No Known Drug Allergies]         SOCIAL HISTORY:   Social History     Social History     Marital status:      Spouse name: N/A     Number of children: N/A     Years of education: N/A     Occupational History     Not on file.     Social History Main Topics     Smoking status: Never Smoker     Smokeless tobacco: Never Used     Alcohol use Yes      Comment: 1-2xweek     Drug use: No     Sexual activity: Yes     Partners: Male     Birth control/ protection: Surgical      Comment: Vasectomy     Other Topics Concern     Parent/Sibling W/ Cabg, Mi Or Angioplasty Before 65f 55m? No     Social History Narrative        FAMILY HISTORY:   Family History   Problem Relation Age of Onset     DIABETES Maternal Grandmother      Hypertension No family hx of      CEREBROVASCULAR DISEASE No family hx of      Breast Cancer No family hx of      Cancer - colorectal No family hx of      Prostate Cancer No family hx of      C.A.D. No family hx of      CANCER Son      brain cancer/germinoma     CANCER Mother      renal cell carcinoma        EXAM:Vitals: /79 (BP Location: Left arm, Patient Position: Sitting, Cuff Size: Adult Regular)  Pulse 70  Ht 5' 8\" (1.727 m)  Wt 145 lb (65.8 kg)  LMP 03/13/2007  BMI 22.05 " kg/m2  BMI= Body mass index is 22.05 kg/(m^2).        General appearance: Patient is alert and fully cooperative with history & exam.  No sign of distress is noted during the visit.     Psychiatric: Affect is pleasant & appropriate.  Patient appears motivated to improve health.     Respiratory: Breathing is regular & unlabored while sitting.     HEENT: Hearing is intact to spoken word.  Speech is clear.  No gross evidence of visual impairment that would impact ambulation.     Dermatologic: Skin is intact to both lower extremities without significant lesions, rash or abrasion.  No paronychia or evidence of soft tissue infection is noted.     Vascular: DP & PT pulses are intact & regular bilaterally.  No significant edema or varicosities noted.  CFT and skin temperature is normal to both lower extremities.     Neurologic: Lower extremity sensation is intact to light touch.  No evidence of weakness or contracture in the lower extremities.  No evidence of neuropathy.     Musculoskeletal: Patient is ambulatory without assistive device or brace.  No gross ankle deformity noted.  No foot or ankle joint effusion is noted.  Noted moderate bunion deformity on the right foot.  One notes pain on palpation of the first metatarsophalangeal joint on the right foot.    Radiographs were evaluated including AP, lateral and medial oblique views of the right foot reveals moderate bunion deformity with an elevated first intermetatarsal angle and hallux abductus angle.  No cortical erosions or periosteal elevation.  All joint margins appear stable.  There is no apparent fracture or tumor formation noted.  There is no evidence of foreign body.    ASSESSMENT / PLAN:     ICD-10-CM    1. Right foot pain M79.671    2. Hallux valgus (acquired), right foot M20.11        I have explained to Carla  about the conditions.  We discussed the nature of the condition as well as the treatment plan and expected length of recovery.  At this time, the  patient wishes to proceed with surgery on the right foot.  At this point, I am recommending surgical treatment of the condition involving a Lapidus bunionectomy on the right foot.  I informed the patient in risks and benefits of the procedure including but not limited to infection, wound complications, swelling, pain, diminished range of motion and function, DVT and reoccurrence of condition.  The procedure will be performed under local with monitored anesthesia care as well as a popliteal block.  The patient will obtain a preoperative history and physical by the primary care provider.  Consents will be reviewed and signed on the day of surgery.        Disclaimer: This note consists of symbols derived from keyboarding, dictation and/or voice recognition software. As a result, there may be errors in the script that have gone undetected. Please consider this when interpreting information found in this chart.       JUDY Jones.P.MOLLY., F.A.C.F.A.S.        Again, thank you for allowing me to participate in the care of your patient.        Sincerely,        Saqib Interiano DPM

## 2018-06-18 NOTE — PROGRESS NOTES
PATIENT HISTORY:  Carla Hong is a 63 year old female who presents to clinic for a painful right foot .  The patient describes the pain as sharp aching.  The patient relates the pain level is moderate.  The patient relates pain is located over the big toe joint on the right foot.  The patient relates the pain has been present for the past several months.  The patient relates pain with ambulation.  The patient has tried wider shoes with little relief.  The patient is inquiring about foot surgery.      REVIEW OF SYSTEMS:  Constitutional, HEENT, cardiovascular, pulmonary, GI, , musculoskeletal, neuro, skin, endocrine and psych systems are negative, except as otherwise noted.     PAST MEDICAL HISTORY:   Past Medical History:   Diagnosis Date     Vulvar cancer (H) 11/12        PAST SURGICAL HISTORY:   Past Surgical History:   Procedure Laterality Date     COLONOSCOPY  12/6/2010    COLONOSCOPY performed by RYAN OSEGUERA at [facility]     VULVECTOMY RADICAL DISSECT GROIN(S)  11/16/2012    Procedure: VULVECTOMY RADICAL DISSECT GROIN(S);  Radical Vulvectomy Bilateral Inginal Femoral Lymph Node Dissection.;  Surgeon: Henrry Granger MD;  Location:  OR        MEDICATIONS:   Current Outpatient Prescriptions:      CALCIUM + D PO, 1 daily, Disp: , Rfl:      MELATONIN PO, Take 10 mg by mouth, Disp: , Rfl:      Multiple Vitamin (DAILY MULTIVITAMIN PO), Take 1 tablet by mouth daily., Disp: , Rfl:      cephALEXin (KEFLEX) 500 MG capsule, Take 1 capsule (500 mg) by mouth 4 times daily (Patient not taking: Reported on 6/18/2018), Disp: 40 capsule, Rfl: 3     fluticasone (FLONASE) 50 MCG/ACT spray, Spray 1-2 sprays into both nostrils daily (Patient not taking: Reported on 1/5/2018), Disp: 1 Bottle, Rfl: 0     levofloxacin (LEVAQUIN) 750 MG tablet, Take 1 tablet (750 mg) by mouth daily (Patient not taking: Reported on 1/5/2018), Disp: 5 tablet, Rfl: 0     oxymetazoline (AFRIN) 0.05 % spray, Spray 2 sprays into both nostrils 2  "times daily Do not use for more than 3 consecutive days, as you may develop rebound congestion. (Patient not taking: Reported on 1/5/2018), Disp: 1 Bottle, Rfl: 0  No current facility-administered medications for this visit.     Facility-Administered Medications Ordered in Other Visits:      sodium chloride (PF) 0.9% PF flush 70 mL, 70 mL, Intravenous, Once, Elin Romano, APRN CNP     ALLERGIES:    Allergies   Allergen Reactions     Nkda [No Known Drug Allergies]         SOCIAL HISTORY:   Social History     Social History     Marital status:      Spouse name: N/A     Number of children: N/A     Years of education: N/A     Occupational History     Not on file.     Social History Main Topics     Smoking status: Never Smoker     Smokeless tobacco: Never Used     Alcohol use Yes      Comment: 1-2xweek     Drug use: No     Sexual activity: Yes     Partners: Male     Birth control/ protection: Surgical      Comment: Vasectomy     Other Topics Concern     Parent/Sibling W/ Cabg, Mi Or Angioplasty Before 65f 55m? No     Social History Narrative        FAMILY HISTORY:   Family History   Problem Relation Age of Onset     DIABETES Maternal Grandmother      Hypertension No family hx of      CEREBROVASCULAR DISEASE No family hx of      Breast Cancer No family hx of      Cancer - colorectal No family hx of      Prostate Cancer No family hx of      C.A.D. No family hx of      CANCER Son      brain cancer/germinoma     CANCER Mother      renal cell carcinoma        EXAM:Vitals: /79 (BP Location: Left arm, Patient Position: Sitting, Cuff Size: Adult Regular)  Pulse 70  Ht 5' 8\" (1.727 m)  Wt 145 lb (65.8 kg)  LMP 03/13/2007  BMI 22.05 kg/m2  BMI= Body mass index is 22.05 kg/(m^2).        General appearance: Patient is alert and fully cooperative with history & exam.  No sign of distress is noted during the visit.     Psychiatric: Affect is pleasant & appropriate.  Patient appears motivated to improve " health.     Respiratory: Breathing is regular & unlabored while sitting.     HEENT: Hearing is intact to spoken word.  Speech is clear.  No gross evidence of visual impairment that would impact ambulation.     Dermatologic: Skin is intact to both lower extremities without significant lesions, rash or abrasion.  No paronychia or evidence of soft tissue infection is noted.     Vascular: DP & PT pulses are intact & regular bilaterally.  No significant edema or varicosities noted.  CFT and skin temperature is normal to both lower extremities.     Neurologic: Lower extremity sensation is intact to light touch.  No evidence of weakness or contracture in the lower extremities.  No evidence of neuropathy.     Musculoskeletal: Patient is ambulatory without assistive device or brace.  No gross ankle deformity noted.  No foot or ankle joint effusion is noted.  Noted moderate bunion deformity on the right foot.  One notes pain on palpation of the first metatarsophalangeal joint on the right foot.    Radiographs were evaluated including AP, lateral and medial oblique views of the right foot reveals moderate bunion deformity with an elevated first intermetatarsal angle and hallux abductus angle.  No cortical erosions or periosteal elevation.  All joint margins appear stable.  There is no apparent fracture or tumor formation noted.  There is no evidence of foreign body.    ASSESSMENT / PLAN:     ICD-10-CM    1. Right foot pain M79.671    2. Hallux valgus (acquired), right foot M20.11        I have explained to Carla  about the conditions.  We discussed the nature of the condition as well as the treatment plan and expected length of recovery.  At this time, the patient wishes to proceed with surgery on the right foot.  At this point, I am recommending surgical treatment of the condition involving a Lapidus bunionectomy on the right foot.  I informed the patient in risks and benefits of the procedure including but not limited to  infection, wound complications, swelling, pain, diminished range of motion and function, DVT and reoccurrence of condition.  The procedure will be performed under local with monitored anesthesia care as well as a popliteal block.  The patient will obtain a preoperative history and physical by the primary care provider.  Consents will be reviewed and signed on the day of surgery.        Disclaimer: This note consists of symbols derived from keyboarding, dictation and/or voice recognition software. As a result, there may be errors in the script that have gone undetected. Please consider this when interpreting information found in this chart.       QIANA Interiano D.P.M., [email or web address].

## 2018-06-19 ENCOUNTER — TELEPHONE (OUTPATIENT)
Dept: PODIATRY | Facility: CLINIC | Age: 63
End: 2018-06-19

## 2018-06-19 NOTE — TELEPHONE ENCOUNTER
Type of surgery: lapidus bunionectomy right foot  Location of surgery: [place] [facility]  Date and time of surgery: 12-18-18  Surgeon: Lion Interiano MD  Pre-Op Appt Date: 12-4-18 8:40 am Raquel Pablo MD  Post-Op Appt Date: 12-21-18 @ 9:00 am    Packet sent out: Not Applicable  Pre-cert/Authorization completed:  Not Applicable  Date: 6-19-18

## 2018-07-29 ENCOUNTER — HEALTH MAINTENANCE LETTER (OUTPATIENT)
Age: 63
End: 2018-07-29

## 2018-08-19 ENCOUNTER — HEALTH MAINTENANCE LETTER (OUTPATIENT)
Age: 63
End: 2018-08-19

## 2018-11-11 ENCOUNTER — HEALTH MAINTENANCE LETTER (OUTPATIENT)
Age: 63
End: 2018-11-11

## 2018-11-14 ENCOUNTER — TELEPHONE (OUTPATIENT)
Dept: PODIATRY | Facility: CLINIC | Age: 63
End: 2018-11-14

## 2018-11-14 NOTE — TELEPHONE ENCOUNTER
I will not perform surgery on both feet at the same time.  It takes the ability to remain non weight bearing away.

## 2018-11-14 NOTE — TELEPHONE ENCOUNTER
Reason for Call:  Other call back    Detailed comments: pt calling stating she is scheduled for surgery on 12/18 for bunion and pin on R foot. She has a bunion on L foot that is giving her pain as well she would like to have taken care of on the same day if possible.     Phone Number Patient can be reached at: Home number on file [phone] (home)    Best Time: any     Can we leave a detailed message on this number? YES    Call taken on 11/14/2018 at 8:38 AM by Coby Pires

## 2018-11-19 NOTE — PATIENT INSTRUCTIONS
Before Your Surgery      Call your surgeon if there is any change in your health. This includes signs of a cold or flu (such as a sore throat, runny nose, cough, rash or fever).    Do not smoke, drink alcohol or take over the counter medicine (unless your surgeon or primary care doctor tells you to) for the 24 hours before and after surgery.    If you take prescribed drugs: Follow your doctor s orders about which medicines to take and which to stop until after surgery.    Eating and drinking prior to surgery: follow the instructions from your surgeon    Take a shower or bath the night before surgery. Use the soap your surgeon gave you to gently clean your skin. If you do not have soap from your surgeon, use your regular soap. Do not shave or scrub the surgery site.  Wear clean pajamas and have clean sheets on your bed.         *    No  aspirin one week before surgery.   *   No ibuprofen 3 days before surgery.   *   Be patient.

## 2018-11-19 NOTE — H&P (VIEW-ONLY)
[facility]  [address] [place] [id number]  [phone]  Dept: [phone]    PRE-OP EVALUATION:  Today's date: 2018    Carla Hogn (: 1955) presents for pre-operative evaluation assessment as requested by Dr. Interiano.  She requires evaluation and anesthesia risk assessment prior to undergoing surgery/procedure for treatment of right foot.    Proposed Surgery/ Procedure: Lapidus bunionectomy right foot. Regional block.  Date of Surgery/ Procedure: 2018  Time of Surgery/ Procedure: 8:30AM  Hospital/Surgical Facility: [facility]  Primary Physician: Raquel Pablo  Type of Anesthesia Anticipated: Combined MAC with Local    Patient has a Health Care Directive or Living Will:  NO    1. NO - Do you have a history of heart attack, stroke, stent, bypass or surgery on an artery in the head, neck, heart or legs?  2. NO - Do you ever have any pain or discomfort in your chest?  3. NO - Do you have a history of  Heart Failure?  4. NO - Are you troubled by shortness of breath when: walking on the level, up a slight hill or at night?  5. NO - Do you currently have a cold, bronchitis or other respiratory infection?  6. NO - Do you have a cough, shortness of breath or wheezing?  7. NO - Do you sometimes get pains in the calves of your legs when you walk?  8. NO - Do you or anyone in your family have previous history of blood clots?  9. NO - Do you or does anyone in your family have a serious bleeding problem such as prolonged bleeding following surgeries or cuts?  10. NO - Have you ever had problems with anemia or been told to take iron pills?  11. NO - Have you had any abnormal blood loss such as black, tarry or bloody stools, or abnormal vaginal bleeding?  12. NO - Have you ever had a blood transfusion?  13. NO - Have you or any of your relatives ever had problems with anesthesia?  14. NO - Do you have sleep apnea, excessive snoring or daytime drowsiness?  15. NO - Do you  have any prosthetic heart valves?  16. NO - Do you have prosthetic joints?  17. NO - Is there any chance that you may be pregnant?      HPI:     HPI related to upcoming procedure: Patient complains of right foot pain .  The patient describes the pain as sharp aching.  The patient relates the pain level is moderate.  The patient relates pain is located over the big toe joint on the right foot.  The patient relates the pain has been present for the past several months.  The patient relates pain with ambulation.  The patient has tried wider shoes with little relief.     See problem list for active medical problems.  Problems all longstanding and stable, except as noted/documented.  See ROS for pertinent symptoms related to these conditions.                                                                                                                                                          .    MEDICAL HISTORY:     Patient Active Problem List    Diagnosis Date Noted     Advanced directives, counseling/discussion 07/19/2016     Priority: Medium     Advance Care Planning 7/19/2016: Information given           History of abnormal Pap smear 03/06/2015     Priority: Medium     Patient reported abnormal pap smear approximately 2005, no further testing or procedures.  Repeat paps have been normal.    10/31/2007:Pap--NIL, neg HPV  7/8/2010:Pap--NIL  11/1/2012:Pap--NIL  3/6/2015:Pap--NIL  02/15/18 Patient is lost to follow-up.        Ptosis of breast 09/17/2013     Priority: Medium     Cellulitis 02/14/2013     Priority: Medium     Cellulitis of groin, left 01/18/2013     Priority: Medium     Vulvar cancer (H) 11/06/2012     Priority: Medium     Vulvar squamous cell carcinoma  Gynecology oncology referral.  11/7/2012 Oncology consult with Henrry Granger MD - [facility] Gyn Oncology  11/16/2012 Radical vulvectomy: Invasive well-differentiated squamous cell carcinoma. All surgical margins are free of invasive carcinoma.  12/19/12:  Post-op scheduled with Henrry Granger MD. Reminder placed in EPIC to track and follow patient and MD plan  Routine surveillance:3 month surveillance visits for total of 2 years and every 6 months for an additional 3 years.   4/5/13: 3 mo f/u appointment scheduled and canceled.  4/1/14: Surveillance visit. Next due in 3 months. In reminders  8/12/14: Surveillance visit. Next due in 3 months. In reminders  3/6/15:Pap--NIL  12/12/16:Pap--NIL, neg HPV. One more 6 month f/u appt w/exam to complete 5 year surveillance (6/2017) -in Tracking  02/15/18 Patient is lost to follow-up.        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     Priority: Medium     CARDIOVASCULAR SCREENING; LDL GOAL LESS THAN 160 10/31/2010     Priority: Medium     Chronic rhinitis 07/08/2010     Priority: Medium      Past Medical History:   Diagnosis Date     Vulvar cancer (H) 11/12     Past Surgical History:   Procedure Laterality Date     COLONOSCOPY  12/6/2010    COLONOSCOPY performed by RYAN OSEGUERA at [facility]     VULVECTOMY RADICAL DISSECT GROIN(S)  11/16/2012    Procedure: VULVECTOMY RADICAL DISSECT GROIN(S);  Radical Vulvectomy Bilateral Inginal Femoral Lymph Node Dissection.;  Surgeon: Henrry Granger MD;  Location: [facility] OR     Current Outpatient Prescriptions   Medication Sig Dispense Refill     CALCIUM + D PO 1 daily       Multiple Vitamin (DAILY MULTIVITAMIN PO) Take 1 tablet by mouth daily.       MELATONIN PO Take 10 mg by mouth       OTC products: none    Allergies   Allergen Reactions     Nkda [No Known Drug Allergies]       Latex Allergy:  "NO    Social History   Substance Use Topics     Smoking status: Never Smoker     Smokeless tobacco: Never Used     Alcohol use Yes      Comment: 1-2xweek     History   Drug Use No       REVIEW OF SYSTEMS:   CONSTITUTIONAL: NEGATIVE for fever, chills, change in weight  INTEGUMENTARY/SKIN: NEGATIVE for worrisome rashes, moles or lesions  EYES: NEGATIVE for vision changes or irritation  ENT/MOUTH: NEGATIVE for ear, mouth and throat problems  RESP: NEGATIVE for significant cough or SOB  BREAST: NEGATIVE for masses, tenderness or discharge  CV: NEGATIVE for chest pain, palpitations or peripheral edema  GI: NEGATIVE for nausea, abdominal pain, heartburn, or change in bowel habits  : NEGATIVE for frequency, dysuria, or hematuria  MUSCULOSKELETAL: NEGATIVE for significant arthralgias or myalgia  NEURO: NEGATIVE for weakness, dizziness or paresthesias  ENDOCRINE: NEGATIVE for temperature intolerance, skin/hair changes  HEME: NEGATIVE for bleeding problems  PSYCHIATRIC: NEGATIVE for changes in mood or affect    This document serves as a record of the services and decisions personally performed and made by Raquel Pablo MD. It was created on his behalf by Margarito Gaona, a trained medical scribe. The creation of this document is based the provider's statements to the medical scribe.  Margarito Gaona 10:10 AM November 26, 2018      EXAM:   /86  Pulse 65  Ht 5' 8\" (1.727 m)  Wt 150 lb 4 oz (68.2 kg)  LMP 03/13/2007  BMI 22.85 kg/m2    GENERAL APPEARANCE: healthy, alert and no distress     EYES: EOMI, PERRL     HENT: ear canals and TM's normal and nose and mouth without ulcers or lesions     RESP: lungs clear to auscultation - no rales, rhonchi or wheezes     CV: regular rates and rhythm, normal S1 S2     ABDOMEN:  soft, nontender, no HSM or masses and bowel sounds normal     MS: Angulation noted at the first MTP joint, right foot.     SKIN: no suspicious lesions or rashes     NEURO: Normal strength and tone, sensory " exam grossly normal, mentation intact and speech normal     PSYCH: mentation appears normal. and affect normal/bright     LYMPHATICS: No cervical adenopathy    DIAGNOSTICS:   EKG: appears normal, NSR, normal axis, normal intervals, no acute ST/T changes c/w ischemia, no LVH by voltage criteria, unchanged from previous tracings    Hemoglobin (indicated for history of anemia or procedure with significant blood loss such as tonsillectomy, major intraperitoneal surgery, vascular surgery, major spine surgery, total joint replacement)          Results for orders placed or performed in visit on 11/26/18   Basic metabolic panel  (Ca, Cl, CO2, Creat, Gluc, K, Na, BUN)   Result Value Ref Range    Sodium 136 133 - 144 mmol/L    Potassium 4.0 3.4 - 5.3 mmol/L    Chloride 103 94 - 109 mmol/L    Carbon Dioxide 27 20 - 32 mmol/L    Anion Gap 6 3 - 14 mmol/L    Glucose 93 70 - 99 mg/dL    Urea Nitrogen 11 7 - 30 mg/dL    Creatinine 0.74 0.52 - 1.04 mg/dL    GFR Estimate 79 >60 mL/min/1.7m2    GFR Estimate If Black >90 >60 mL/min/1.7m2    Calcium 8.7 8.5 - 10.1 mg/dL   CBC with platelets   Result Value Ref Range    WBC 8.0 4.0 - 11.0 10e9/L    RBC Count 4.78 3.8 - 5.2 10e12/L    Hemoglobin 13.2 11.7 - 15.7 g/dL    Hematocrit 39.6 35.0 - 47.0 %    MCV 83 78 - 100 fl    MCH 27.6 26.5 - 33.0 pg    MCHC 33.3 31.5 - 36.5 g/dL    RDW 13.3 10.0 - 15.0 %    Platelet Count 265 150 - 450 10e9/L      IMPRESSION:   The proposed surgical procedure is considered INTERMEDIATE risk.    REVISED CARDIAC RISK INDEX  The patient has the following serious cardiovascular risks for perioperative complications such as (MI, PE, VFib and 3  AV Block):  No serious cardiac risks  INTERPRETATION: 0 risks: Class I (very low risk - 0.4% complication rate)    The patient has the following additional risks for perioperative complications:  No identified additional risks      ICD-10-CM    1. Preop general physical exam Z01.818 EKG 12-lead complete w/read - Clinics      Basic metabolic panel  (Ca, Cl, CO2, Creat, Gluc, K, Na, BUN)     CBC with platelets   2. Bunion, right M21.611        RECOMMENDATIONS:     --Patient is to take all scheduled medications on the day of surgery EXCEPT for modifications listed below.    Anticoagulant or Antiplatelet Medication Use  ASPIRIN: Discontinue ASA 7-10 days prior to procedure to reduce bleeding risk.  It should be resumed post-operatively.  NSAIDS: Ibuprofen (Motrin): Stop three days prior to surgery        APPROVAL GIVEN to proceed with proposed procedure, without further diagnostic evaluation     The information in this document, created by a scribe for me, accurately reflects the services I personally performed and the decisions made by me. I have reviewed and approved this document for accuracy.     Signed Electronically by: Raquel Pablo MD    Copy of this evaluation report is provided to requesting physician.    [place] Preop Guidelines    Revised Cardiac Risk Index

## 2018-11-19 NOTE — PROGRESS NOTES
[facility]  [address] [place] [id number]  [phone]  Dept: [phone]    PRE-OP EVALUATION:  Today's date: 2018    Carla Hong (: 1955) presents for pre-operative evaluation assessment as requested by Dr. Interiano.  She requires evaluation and anesthesia risk assessment prior to undergoing surgery/procedure for treatment of right foot.    Proposed Surgery/ Procedure: Lapidus bunionectomy right foot. Regional block.  Date of Surgery/ Procedure: 2018  Time of Surgery/ Procedure: 8:30AM  Hospital/Surgical Facility: [facility]  Primary Physician: Raquel Pablo  Type of Anesthesia Anticipated: Combined MAC with Local    Patient has a Health Care Directive or Living Will:  NO    1. NO - Do you have a history of heart attack, stroke, stent, bypass or surgery on an artery in the head, neck, heart or legs?  2. NO - Do you ever have any pain or discomfort in your chest?  3. NO - Do you have a history of  Heart Failure?  4. NO - Are you troubled by shortness of breath when: walking on the level, up a slight hill or at night?  5. NO - Do you currently have a cold, bronchitis or other respiratory infection?  6. NO - Do you have a cough, shortness of breath or wheezing?  7. NO - Do you sometimes get pains in the calves of your legs when you walk?  8. NO - Do you or anyone in your family have previous history of blood clots?  9. NO - Do you or does anyone in your family have a serious bleeding problem such as prolonged bleeding following surgeries or cuts?  10. NO - Have you ever had problems with anemia or been told to take iron pills?  11. NO - Have you had any abnormal blood loss such as black, tarry or bloody stools, or abnormal vaginal bleeding?  12. NO - Have you ever had a blood transfusion?  13. NO - Have you or any of your relatives ever had problems with anesthesia?  14. NO - Do you have sleep apnea, excessive snoring or daytime drowsiness?  15. NO - Do you  have any prosthetic heart valves?  16. NO - Do you have prosthetic joints?  17. NO - Is there any chance that you may be pregnant?      HPI:     HPI related to upcoming procedure: Patient complains of right foot pain .  The patient describes the pain as sharp aching.  The patient relates the pain level is moderate.  The patient relates pain is located over the big toe joint on the right foot.  The patient relates the pain has been present for the past several months.  The patient relates pain with ambulation.  The patient has tried wider shoes with little relief.     See problem list for active medical problems.  Problems all longstanding and stable, except as noted/documented.  See ROS for pertinent symptoms related to these conditions.                                                                                                                                                          .    MEDICAL HISTORY:     Patient Active Problem List    Diagnosis Date Noted     Advanced directives, counseling/discussion 07/19/2016     Priority: Medium     Advance Care Planning 7/19/2016: Information given           History of abnormal Pap smear 03/06/2015     Priority: Medium     Patient reported abnormal pap smear approximately 2005, no further testing or procedures.  Repeat paps have been normal.    10/31/2007:Pap--NIL, neg HPV  7/8/2010:Pap--NIL  11/1/2012:Pap--NIL  3/6/2015:Pap--NIL  02/15/18 Patient is lost to follow-up.        Ptosis of breast 09/17/2013     Priority: Medium     Cellulitis 02/14/2013     Priority: Medium     Cellulitis of groin, left 01/18/2013     Priority: Medium     Vulvar cancer (H) 11/06/2012     Priority: Medium     Vulvar squamous cell carcinoma  Gynecology oncology referral.  11/7/2012 Oncology consult with Henrry Granger MD - [facility]  11/16/2012 Radical vulvectomy: Invasive well-differentiated squamous cell carcinoma. All surgical margins are free of invasive carcinoma.  12/19/12:  Post-op scheduled with Henrry Granger MD. Reminder placed in EPIC to track and follow patient and MD plan  Routine surveillance:3 month surveillance visits for total of 2 years and every 6 months for an additional 3 years.   4/5/13: 3 mo f/u appointment scheduled and canceled.  4/1/14: Surveillance visit. Next due in 3 months. In reminders  8/12/14: Surveillance visit. Next due in 3 months. In reminders  3/6/15:Pap--NIL  12/12/16:Pap--NIL, neg HPV. One more 6 month f/u appt w/exam to complete 5 year surveillance (6/2017) -in Tracking  02/15/18 Patient is lost to follow-up.        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     Priority: Medium     CARDIOVASCULAR SCREENING; LDL GOAL LESS THAN 160 10/31/2010     Priority: Medium     Chronic rhinitis 07/08/2010     Priority: Medium      Past Medical History:   Diagnosis Date     Vulvar cancer (H) 11/12     Past Surgical History:   Procedure Laterality Date     COLONOSCOPY  12/6/2010    COLONOSCOPY performed by RYAN OSEGUERA at [facility]     VULVECTOMY RADICAL DISSECT GROIN(S)  11/16/2012    Procedure: VULVECTOMY RADICAL DISSECT GROIN(S);  Radical Vulvectomy Bilateral Inginal Femoral Lymph Node Dissection.;  Surgeon: Henrry Granger MD;  Location: [facility] OR     Current Outpatient Prescriptions   Medication Sig Dispense Refill     CALCIUM + D PO 1 daily       Multiple Vitamin (DAILY MULTIVITAMIN PO) Take 1 tablet by mouth daily.       MELATONIN PO Take 10 mg by mouth       OTC products: none    Allergies   Allergen Reactions     Nkda [No Known Drug Allergies]       Latex Allergy:  "NO    Social History   Substance Use Topics     Smoking status: Never Smoker     Smokeless tobacco: Never Used     Alcohol use Yes      Comment: 1-2xweek     History   Drug Use No       REVIEW OF SYSTEMS:   CONSTITUTIONAL: NEGATIVE for fever, chills, change in weight  INTEGUMENTARY/SKIN: NEGATIVE for worrisome rashes, moles or lesions  EYES: NEGATIVE for vision changes or irritation  ENT/MOUTH: NEGATIVE for ear, mouth and throat problems  RESP: NEGATIVE for significant cough or SOB  BREAST: NEGATIVE for masses, tenderness or discharge  CV: NEGATIVE for chest pain, palpitations or peripheral edema  GI: NEGATIVE for nausea, abdominal pain, heartburn, or change in bowel habits  : NEGATIVE for frequency, dysuria, or hematuria  MUSCULOSKELETAL: NEGATIVE for significant arthralgias or myalgia  NEURO: NEGATIVE for weakness, dizziness or paresthesias  ENDOCRINE: NEGATIVE for temperature intolerance, skin/hair changes  HEME: NEGATIVE for bleeding problems  PSYCHIATRIC: NEGATIVE for changes in mood or affect    This document serves as a record of the services and decisions personally performed and made by Raquel Pablo MD. It was created on his behalf by Margarito Gaona, a trained medical scribe. The creation of this document is based the provider's statements to the medical scribe.  Margarito Gaona 10:10 AM November 26, 2018      EXAM:   /86  Pulse 65  Ht 5' 8\" (1.727 m)  Wt 150 lb 4 oz (68.2 kg)  LMP 03/13/2007  BMI 22.85 kg/m2    GENERAL APPEARANCE: healthy, alert and no distress     EYES: EOMI, PERRL     HENT: ear canals and TM's normal and nose and mouth without ulcers or lesions     RESP: lungs clear to auscultation - no rales, rhonchi or wheezes     CV: regular rates and rhythm, normal S1 S2     ABDOMEN:  soft, nontender, no HSM or masses and bowel sounds normal     MS: Angulation noted at the first MTP joint, right foot.     SKIN: no suspicious lesions or rashes     NEURO: Normal strength and tone, sensory " exam grossly normal, mentation intact and speech normal     PSYCH: mentation appears normal. and affect normal/bright     LYMPHATICS: No cervical adenopathy    DIAGNOSTICS:   EKG: appears normal, NSR, normal axis, normal intervals, no acute ST/T changes c/w ischemia, no LVH by voltage criteria, unchanged from previous tracings    Hemoglobin (indicated for history of anemia or procedure with significant blood loss such as tonsillectomy, major intraperitoneal surgery, vascular surgery, major spine surgery, total joint replacement)          Results for orders placed or performed in visit on 11/26/18   Basic metabolic panel  (Ca, Cl, CO2, Creat, Gluc, K, Na, BUN)   Result Value Ref Range    Sodium 136 133 - 144 mmol/L    Potassium 4.0 3.4 - 5.3 mmol/L    Chloride 103 94 - 109 mmol/L    Carbon Dioxide 27 20 - 32 mmol/L    Anion Gap 6 3 - 14 mmol/L    Glucose 93 70 - 99 mg/dL    Urea Nitrogen 11 7 - 30 mg/dL    Creatinine 0.74 0.52 - 1.04 mg/dL    GFR Estimate 79 >60 mL/min/1.7m2    GFR Estimate If Black >90 >60 mL/min/1.7m2    Calcium 8.7 8.5 - 10.1 mg/dL   CBC with platelets   Result Value Ref Range    WBC 8.0 4.0 - 11.0 10e9/L    RBC Count 4.78 3.8 - 5.2 10e12/L    Hemoglobin 13.2 11.7 - 15.7 g/dL    Hematocrit 39.6 35.0 - 47.0 %    MCV 83 78 - 100 fl    MCH 27.6 26.5 - 33.0 pg    MCHC 33.3 31.5 - 36.5 g/dL    RDW 13.3 10.0 - 15.0 %    Platelet Count 265 150 - 450 10e9/L      IMPRESSION:   The proposed surgical procedure is considered INTERMEDIATE risk.    REVISED CARDIAC RISK INDEX  The patient has the following serious cardiovascular risks for perioperative complications such as (MI, PE, VFib and 3  AV Block):  No serious cardiac risks  INTERPRETATION: 0 risks: Class I (very low risk - 0.4% complication rate)    The patient has the following additional risks for perioperative complications:  No identified additional risks      ICD-10-CM    1. Preop general physical exam Z01.818 EKG 12-lead complete w/read - Clinics      Basic metabolic panel  (Ca, Cl, CO2, Creat, Gluc, K, Na, BUN)     CBC with platelets   2. Bunion, right M21.611        RECOMMENDATIONS:     --Patient is to take all scheduled medications on the day of surgery EXCEPT for modifications listed below.    Anticoagulant or Antiplatelet Medication Use  ASPIRIN: Discontinue ASA 7-10 days prior to procedure to reduce bleeding risk.  It should be resumed post-operatively.  NSAIDS: Ibuprofen (Motrin): Stop three days prior to surgery        APPROVAL GIVEN to proceed with proposed procedure, without further diagnostic evaluation     The information in this document, created by a scribe for me, accurately reflects the services I personally performed and the decisions made by me. I have reviewed and approved this document for accuracy.     Signed Electronically by: Raquel Pablo MD    Copy of this evaluation report is provided to requesting physician.    [facility] Preop Guidelines    Revised Cardiac Risk Index

## 2018-11-26 ENCOUNTER — OFFICE VISIT (OUTPATIENT)
Dept: FAMILY MEDICINE | Facility: CLINIC | Age: 63
End: 2018-11-26
Payer: COMMERCIAL

## 2018-11-26 VITALS
BODY MASS INDEX: 22.77 KG/M2 | DIASTOLIC BLOOD PRESSURE: 86 MMHG | HEIGHT: 68 IN | SYSTOLIC BLOOD PRESSURE: 127 MMHG | HEART RATE: 65 BPM | WEIGHT: 150.25 LBS

## 2018-11-26 DIAGNOSIS — Z01.818 PREOP GENERAL PHYSICAL EXAM: Primary | ICD-10-CM

## 2018-11-26 DIAGNOSIS — M21.611 BUNION, RIGHT: ICD-10-CM

## 2018-11-26 LAB
ANION GAP SERPL CALCULATED.3IONS-SCNC: 6 MMOL/L (ref 3–14)
BUN SERPL-MCNC: 11 MG/DL (ref 7–30)
CALCIUM SERPL-MCNC: 8.7 MG/DL (ref 8.5–10.1)
CHLORIDE SERPL-SCNC: 103 MMOL/L (ref 94–109)
CO2 SERPL-SCNC: 27 MMOL/L (ref 20–32)
CREAT SERPL-MCNC: 0.74 MG/DL (ref 0.52–1.04)
ERYTHROCYTE [DISTWIDTH] IN BLOOD BY AUTOMATED COUNT: 13.3 % (ref 10–15)
GFR SERPL CREATININE-BSD FRML MDRD: 79 ML/MIN/1.7M2
GLUCOSE SERPL-MCNC: 93 MG/DL (ref 70–99)
HCT VFR BLD AUTO: 39.6 % (ref 35–47)
HGB BLD-MCNC: 13.2 G/DL (ref 11.7–15.7)
MCH RBC QN AUTO: 27.6 PG (ref 26.5–33)
MCHC RBC AUTO-ENTMCNC: 33.3 G/DL (ref 31.5–36.5)
MCV RBC AUTO: 83 FL (ref 78–100)
PLATELET # BLD AUTO: 265 10E9/L (ref 150–450)
POTASSIUM SERPL-SCNC: 4 MMOL/L (ref 3.4–5.3)
RBC # BLD AUTO: 4.78 10E12/L (ref 3.8–5.2)
SODIUM SERPL-SCNC: 136 MMOL/L (ref 133–144)
WBC # BLD AUTO: 8 10E9/L (ref 4–11)

## 2018-11-26 PROCEDURE — 36415 COLL VENOUS BLD VENIPUNCTURE: CPT | Performed by: FAMILY MEDICINE

## 2018-11-26 PROCEDURE — 85027 COMPLETE CBC AUTOMATED: CPT | Performed by: FAMILY MEDICINE

## 2018-11-26 PROCEDURE — 80048 BASIC METABOLIC PNL TOTAL CA: CPT | Performed by: FAMILY MEDICINE

## 2018-11-26 PROCEDURE — 99214 OFFICE O/P EST MOD 30 MIN: CPT | Performed by: FAMILY MEDICINE

## 2018-11-26 PROCEDURE — 93000 ELECTROCARDIOGRAM COMPLETE: CPT | Performed by: FAMILY MEDICINE

## 2018-11-26 NOTE — MR AVS SNAPSHOT
After Visit Summary   11/26/2018    Carla Hong    MRN: [id number]           Patient Information     Date Of Birth          1955        Visit Information        Provider Department      11/26/2018 8:40 AM Raquel Pablo MD [facility]        Today's Diagnoses     Preop general physical exam    -  1    Bunion, right          Care Instructions      Before Your Surgery      Call your surgeon if there is any change in your health. This includes signs of a cold or flu (such as a sore throat, runny nose, cough, rash or fever).    Do not smoke, drink alcohol or take over the counter medicine (unless your surgeon or primary care doctor tells you to) for the 24 hours before and after surgery.    If you take prescribed drugs: Follow your doctor s orders about which medicines to take and which to stop until after surgery.    Eating and drinking prior to surgery: follow the instructions from your surgeon    Take a shower or bath the night before surgery. Use the soap your surgeon gave you to gently clean your skin. If you do not have soap from your surgeon, use your regular soap. Do not shave or scrub the surgery site.  Wear clean pajamas and have clean sheets on your bed.         *    No  aspirin one week before surgery.   *   No ibuprofen 3 days before surgery.   *   Be patient.           Follow-ups after your visit        Follow-up notes from your care team     Return in about 1 year (around 11/26/2019).      Your next 10 appointments already scheduled     Dec 18, 2018   Procedure with Saqib Interiano DPM   [facility] (--)    [address] [place] [id number] [phone]           The medical center is located at [address]. (between [address] in [place], [place] north of [place]).            Dec 21, 2018  9:00 AM CST   Return Visit with Saqib Interiano DPM   [facility] ([facility])  "   [address] [address]   [phone]              Who to contact     Normal or non-critical lab and imaging results will be communicated to you by [facility], letter or phone within 4 business days after the clinic has received the results. If you do not hear from us within 7 days, please contact the clinic through [facility] or phone. If you have a critical or abnormal lab result, we will notify you by phone as soon as possible.  Submit refill requests through [facility] or call your pharmacy and they will forward the refill request to us. Please allow 3 business days for your refill to be completed.          If you need to speak with a  for additional information , please call: [phone]           Additional Information About Your Visit        [facility] Information     [facility] gives you secure access to your electronic health record. If you see a primary care provider, you can also send messages to your care team and make appointments. If you have questions, please call your primary care clinic.  If you do not have a primary care provider, please call [phone] and they will assist you.        Care EveryWhere ID     This is your Care EveryWhere ID. This could be used by other organizations to access your [place] medical records  [id number]        Your Vitals Were     Pulse Height Last Period BMI (Body Mass Index)          65 5' 8\" (1.727 m) 03/13/2007 22.85 kg/m2         Blood Pressure from Last 3 Encounters:   11/26/18 127/86   06/18/18 117/79   01/05/18 112/77    Weight from Last 3 Encounters:   11/26/18 150 lb 4 oz (68.2 kg)   06/18/18 145 lb (65.8 kg)   01/05/18 145 lb (65.8 kg)              We Performed the Following     Basic metabolic panel  (Ca, Cl, CO2, Creat, Gluc, K, Na, BUN)     CBC with platelets     EKG 12-lead complete w/read - Clinics          Today's Medication Changes          These changes are accurate as of 11/26/18  9:06 AM.  If you have any questions, " ask your nurse or doctor.               Stop taking these medicines if you haven't already. Please contact your care team if you have questions.     cephALEXin 500 MG capsule   Commonly known as:  KEFLEX   Stopped by:  Raquel Pablo MD           fluticasone 50 MCG/ACT spray   Commonly known as:  FLONASE   Stopped by:  Raquel Pablo MD           levofloxacin 750 MG tablet   Commonly known as:  LEVAQUIN   Stopped by:  Raquel Pablo MD           oxymetazoline 0.05 % spray   Commonly known as:  AFRIN   Stopped by:  Raquel Pablo MD                    Primary Care Provider Office Phone # Fax #    Raquel Pablo [phone] [address] [place] [address]        Equal Access to Services     [facility]: Hadii olaf ku hadasho Sojohnali, waaxda luqadaha, qaybta kaalmada adeegyada, waxay idiin haydanieln tete parson . So [facility] [phone].    ATENCIÓN: Si habla español, tiene a redd disposición servicios gratuitos de asistencia lingüística. [facility] [phone].    We comply with applicable federal civil rights laws and [place] laws. We do not discriminate on the basis of race, color, national origin, age, disability, sex, sexual orientation, or gender identity.            Thank you!     Thank you for choosing [facility]  for your care. Our goal is always to provide you with excellent care. Hearing back from our patients is one way we can continue to improve our services. Please take a few minutes to complete the written survey that you may receive in the mail after your visit with us. Thank you!             Your Updated Medication List - Protect others around you: Learn how to safely use, store and throw away your medicines at www.disposemymeds.org.          This list is accurate as of 11/26/18  9:06 AM.  Always use your most recent med list.                   Brand Name Dispense Instructions for use Diagnosis    CALCIUM + D PO      1 daily         DAILY MULTIVITAMIN PO      Take 1 tablet by mouth daily.        MELATONIN PO      Take 10 mg by mouth

## 2018-12-14 ENCOUNTER — ANESTHESIA EVENT (OUTPATIENT)
Dept: SURGERY | Facility: CLINIC | Age: 63
End: 2018-12-14
Payer: COMMERCIAL

## 2018-12-18 ENCOUNTER — ANESTHESIA (OUTPATIENT)
Dept: SURGERY | Facility: CLINIC | Age: 63
End: 2018-12-18
Payer: COMMERCIAL

## 2018-12-18 ENCOUNTER — APPOINTMENT (OUTPATIENT)
Dept: GENERAL RADIOLOGY | Facility: CLINIC | Age: 63
End: 2018-12-18
Attending: PODIATRIST
Payer: COMMERCIAL

## 2018-12-18 ENCOUNTER — HOSPITAL ENCOUNTER (OUTPATIENT)
Facility: CLINIC | Age: 63
Discharge: HOME | End: 2018-12-18
Attending: PODIATRIST | Admitting: PODIATRIST
Payer: COMMERCIAL

## 2018-12-18 VITALS
WEIGHT: 150.13 LBS | OXYGEN SATURATION: 97 % | HEIGHT: 68 IN | TEMPERATURE: 97.8 F | RESPIRATION RATE: 16 BRPM | DIASTOLIC BLOOD PRESSURE: 80 MMHG | BODY MASS INDEX: 22.75 KG/M2 | SYSTOLIC BLOOD PRESSURE: 127 MMHG

## 2018-12-18 DIAGNOSIS — M20.11 HALLUX VALGUS, RIGHT: Primary | ICD-10-CM

## 2018-12-18 PROCEDURE — 36000058 ZZH SURGERY LEVEL 3 EA 15 ADDTL MIN: Performed by: PODIATRIST

## 2018-12-18 PROCEDURE — 36000060 ZZH SURGERY LEVEL 3 W FLUORO 1ST 30 MIN: Performed by: PODIATRIST

## 2018-12-18 PROCEDURE — 25000128 H RX IP 250 OP 636: Performed by: PODIATRIST

## 2018-12-18 PROCEDURE — 37000009 ZZH ANESTHESIA TECHNICAL FEE, EACH ADDTL 15 MIN: Performed by: PODIATRIST

## 2018-12-18 PROCEDURE — 37000011 ZZH ANESTHESIA WARD SERVICE: Performed by: NURSE ANESTHETIST, CERTIFIED REGISTERED

## 2018-12-18 PROCEDURE — 25000128 H RX IP 250 OP 636: Performed by: NURSE ANESTHETIST, CERTIFIED REGISTERED

## 2018-12-18 PROCEDURE — 27210794 ZZH OR GENERAL SUPPLY STERILE: Performed by: PODIATRIST

## 2018-12-18 PROCEDURE — 40000277 XR SURGERY CARM FLUORO LESS THAN 5 MIN W STILLS: Mod: TC

## 2018-12-18 PROCEDURE — 28297 COR HLX VLGS JT ARTHRD: CPT | Mod: RT | Performed by: PODIATRIST

## 2018-12-18 PROCEDURE — 37000008 ZZH ANESTHESIA TECHNICAL FEE, 1ST 30 MIN: Performed by: PODIATRIST

## 2018-12-18 PROCEDURE — C1713 ANCHOR/SCREW BN/BN,TIS/BN: HCPCS | Performed by: PODIATRIST

## 2018-12-18 PROCEDURE — C1769 GUIDE WIRE: HCPCS | Performed by: PODIATRIST

## 2018-12-18 PROCEDURE — 25000125 ZZHC RX 250: Performed by: NURSE ANESTHETIST, CERTIFIED REGISTERED

## 2018-12-18 PROCEDURE — 71000027 ZZH RECOVERY PHASE 2 EACH 15 MINS: Performed by: PODIATRIST

## 2018-12-18 PROCEDURE — 40000305 ZZH STATISTIC PRE PROC ASSESS I: Performed by: PODIATRIST

## 2018-12-18 PROCEDURE — 25000125 ZZHC RX 250: Performed by: PODIATRIST

## 2018-12-18 DEVICE — IMP SCR ARTHREX BLUE LOCKING 3.5X20MM AR-8935L-20: Type: IMPLANTABLE DEVICE | Site: FOOT | Status: FUNCTIONAL

## 2018-12-18 DEVICE — IMP SCR ARTHREX QUICKFIX CAN ST 4.0X36MM TI AR-8740-36PTS: Type: IMPLANTABLE DEVICE | Site: FOOT | Status: FUNCTIONAL

## 2018-12-18 DEVICE — IMP SCR ARTHREX CORTICAL 3.5MMX26MM AR-8935-26: Type: IMPLANTABLE DEVICE | Site: FOOT | Status: FUNCTIONAL

## 2018-12-18 DEVICE — IMP SCR ARTHREX BLUE LOCKING 3.5X16MM AR-8935L-16: Type: IMPLANTABLE DEVICE | Site: FOOT | Status: FUNCTIONAL

## 2018-12-18 RX ORDER — CEFAZOLIN SODIUM 2 G/100ML
2 INJECTION, SOLUTION INTRAVENOUS
Status: COMPLETED | OUTPATIENT
Start: 2018-12-18 | End: 2018-12-18

## 2018-12-18 RX ORDER — BACITRACIN ZINC 500 [USP'U]/G
OINTMENT TOPICAL PRN
Status: DISCONTINUED | OUTPATIENT
Start: 2018-12-18 | End: 2018-12-18 | Stop reason: HOSPADM

## 2018-12-18 RX ORDER — CEFAZOLIN SODIUM 1 G/50ML
1 INJECTION, SOLUTION INTRAVENOUS SEE ADMIN INSTRUCTIONS
Status: DISCONTINUED | OUTPATIENT
Start: 2018-12-18 | End: 2018-12-18 | Stop reason: HOSPADM

## 2018-12-18 RX ORDER — SODIUM CHLORIDE, SODIUM LACTATE, POTASSIUM CHLORIDE, CALCIUM CHLORIDE 600; 310; 30; 20 MG/100ML; MG/100ML; MG/100ML; MG/100ML
INJECTION, SOLUTION INTRAVENOUS CONTINUOUS
Status: DISCONTINUED | OUTPATIENT
Start: 2018-12-18 | End: 2018-12-18 | Stop reason: HOSPADM

## 2018-12-18 RX ORDER — ONDANSETRON 2 MG/ML
INJECTION INTRAMUSCULAR; INTRAVENOUS PRN
Status: DISCONTINUED | OUTPATIENT
Start: 2018-12-18 | End: 2018-12-18

## 2018-12-18 RX ORDER — OXYCODONE HYDROCHLORIDE 5 MG/1
5 TABLET ORAL
Status: DISCONTINUED | OUTPATIENT
Start: 2018-12-18 | End: 2018-12-18 | Stop reason: HOSPADM

## 2018-12-18 RX ORDER — OXYCODONE AND ACETAMINOPHEN 5; 325 MG/1; MG/1
1-2 TABLET ORAL EVERY 4 HOURS PRN
Qty: 30 TABLET | Refills: 0 | Status: SHIPPED | OUTPATIENT
Start: 2018-12-18 | End: 2019-02-22

## 2018-12-18 RX ORDER — HYDROXYZINE HYDROCHLORIDE 25 MG/1
25 TABLET, FILM COATED ORAL 3 TIMES DAILY PRN
Qty: 30 TABLET | Refills: 0 | Status: SHIPPED | OUTPATIENT
Start: 2018-12-18 | End: 2018-12-27

## 2018-12-18 RX ORDER — ROPIVACAINE HYDROCHLORIDE 7.5 MG/ML
INJECTION, SOLUTION EPIDURAL; PERINEURAL PRN
Status: DISCONTINUED | OUTPATIENT
Start: 2018-12-18 | End: 2018-12-18

## 2018-12-18 RX ORDER — PROPOFOL 10 MG/ML
INJECTION, EMULSION INTRAVENOUS CONTINUOUS PRN
Status: DISCONTINUED | OUTPATIENT
Start: 2018-12-18 | End: 2018-12-18

## 2018-12-18 RX ORDER — LIDOCAINE HCL/EPINEPHRINE/PF 2%-1:200K
VIAL (ML) INJECTION PRN
Status: DISCONTINUED | OUTPATIENT
Start: 2018-12-18 | End: 2018-12-18

## 2018-12-18 RX ORDER — LIDOCAINE 40 MG/G
CREAM TOPICAL
Status: DISCONTINUED | OUTPATIENT
Start: 2018-12-18 | End: 2018-12-18 | Stop reason: HOSPADM

## 2018-12-18 RX ORDER — AMOXICILLIN 250 MG
1-2 CAPSULE ORAL 2 TIMES DAILY
Qty: 56 TABLET | Refills: 0 | Status: SHIPPED | OUTPATIENT
Start: 2018-12-18 | End: 2019-02-22

## 2018-12-18 RX ORDER — DEXAMETHASONE SODIUM PHOSPHATE 4 MG/ML
INJECTION, SOLUTION INTRA-ARTICULAR; INTRALESIONAL; INTRAMUSCULAR; INTRAVENOUS; SOFT TISSUE PRN
Status: DISCONTINUED | OUTPATIENT
Start: 2018-12-18 | End: 2018-12-18

## 2018-12-18 RX ORDER — LIDOCAINE HYDROCHLORIDE 10 MG/ML
INJECTION, SOLUTION EPIDURAL; INFILTRATION; INTRACAUDAL; PERINEURAL PRN
Status: DISCONTINUED | OUTPATIENT
Start: 2018-12-18 | End: 2018-12-18

## 2018-12-18 RX ORDER — FENTANYL CITRATE 50 UG/ML
INJECTION, SOLUTION INTRAMUSCULAR; INTRAVENOUS PRN
Status: DISCONTINUED | OUTPATIENT
Start: 2018-12-18 | End: 2018-12-18

## 2018-12-18 RX ADMIN — PROPOFOL 150 MCG/KG/MIN: 10 INJECTION, EMULSION INTRAVENOUS at 08:30

## 2018-12-18 RX ADMIN — ONDANSETRON 4 MG: 2 INJECTION INTRAMUSCULAR; INTRAVENOUS at 08:30

## 2018-12-18 RX ADMIN — DEXAMETHASONE SODIUM PHOSPHATE 4 MG: 4 INJECTION, SOLUTION INTRA-ARTICULAR; INTRALESIONAL; INTRAMUSCULAR; INTRAVENOUS; SOFT TISSUE at 08:30

## 2018-12-18 RX ADMIN — LIDOCAINE HYDROCHLORIDE,EPINEPHRINE BITARTRATE 5 ML: 20; .005 INJECTION, SOLUTION EPIDURAL; INFILTRATION; INTRACAUDAL; PERINEURAL at 07:59

## 2018-12-18 RX ADMIN — CEFAZOLIN SODIUM 2 G: 2 INJECTION, SOLUTION INTRAVENOUS at 08:23

## 2018-12-18 RX ADMIN — FENTANYL CITRATE 100 MCG: 50 INJECTION, SOLUTION INTRAMUSCULAR; INTRAVENOUS at 08:02

## 2018-12-18 RX ADMIN — ROPIVACAINE HYDROCHLORIDE 20 ML: 7.5 INJECTION, SOLUTION EPIDURAL; PERINEURAL at 08:00

## 2018-12-18 RX ADMIN — SODIUM CHLORIDE, POTASSIUM CHLORIDE, SODIUM LACTATE AND CALCIUM CHLORIDE: 600; 310; 30; 20 INJECTION, SOLUTION INTRAVENOUS at 08:24

## 2018-12-18 RX ADMIN — LIDOCAINE HYDROCHLORIDE 5 ML: 10 INJECTION, SOLUTION EPIDURAL; INFILTRATION; INTRACAUDAL; PERINEURAL at 07:58

## 2018-12-18 RX ADMIN — ROPIVACAINE HYDROCHLORIDE 20 ML: 7.5 INJECTION, SOLUTION EPIDURAL; PERINEURAL at 08:05

## 2018-12-18 RX ADMIN — MIDAZOLAM 2 MG: 1 INJECTION INTRAMUSCULAR; INTRAVENOUS at 08:02

## 2018-12-18 ASSESSMENT — MIFFLIN-ST. JEOR: SCORE: 1289.37

## 2018-12-18 NOTE — ANESTHESIA PROCEDURE NOTES
Peripheral nerve/Neuraxial procedure note : sciatic, saphenous and popliteal  Pre-Procedure  Performed by  Amrik Joyce APRN CRNA   Location: pre-op      Pre-Anesthestic Checklist: patient identified, IV checked, site marked, risks and benefits discussed, informed consent, monitors and equipment checked, pre-op evaluation, at physician/surgeon's request and post-op pain management    Timeout  Correct Patient: Yes   Correct Procedure: Yes   Correct Site: Yes   Correct Laterality: Yes   Correct Position: Yes   Site Marked: Yes   .   Procedure Documentation    .    Procedure:  right  Sciatic, saphenous and popliteal.  Local skin infiltrated with mL of 1% lidocaine.     Ultrasound used to identify targeted nerve, plexus, or vascular marker and placed a needle adjacent to it., Ultrasound was used to visualize the spread of the anesthetic in close proximity to the above stated nerve. A permanent image is entered into the patient's record.  Patient Prep;mask, sterile gloves, chlorhexidine gluconate and isopropyl alcohol, patient draped.  Nerve Stim: Initial Level 1 mA.  Lowest motor response 48 mA..  Needle: insulated, short bevel Needle Gauge: 20.    Needle Length (Inches) 4  Insertion Method: Single Shot.       Assessment/Narrative  Paresthesias: No.  Injection made incrementally with aspirations every 5 mL..  The placement was negative for: blood aspirated, painful injection and site bleeding.  Bolus given via needle. No blood aspirated via catheter.   Secured via.   Complications: none. Test dose of mL lidocaine 1.5% w/ 1:200,000 epinephrine at. Test dose negative for signs of intravascular, subdural or intrathecal injection. Comments:  Total volume injected at Sciatic nerve:20    Total volume injected at Saphenous Nerve:20

## 2018-12-18 NOTE — DISCHARGE INSTRUCTIONS
Same Day Surgery Discharge Instructions  Special Precautions After Surgery - Adult    1. It is not unusual to feel lightheaded or faint, up to 24 hours after surgery or while taking pain medication.  If you have these symptoms; sit for a few minutes before standing and have someone assist you when getting up.  2. You should rest and relax for the next 24 hours and must have someone stay with you for at least 24 hours after your discharge.  3. DO NOT DRIVE any vehicle or operate mechanical equipment for 24 hours following the end of your surgery.  DO NOT DRIVE while taking narcotic pain medications that have been prescribed by your physician.  If you had a limb operated on, you must be able to use it fully to drive.  4. DO NOT drink alcoholic beverages for 24 hours following surgery or while taking prescription pain medication.  5. Drink clear liquids (apple juice, ginger ale, broth, 7-Up, etc.).  Progress to your regular diet as you feel able.  6. Any questions call your physician and do not make important decisions for 24 hours.    __________________________________________________________________________________________________________________________________  IMPORTANT NUMBERS:    [facility] Main Number:  [phone], [phone]  Pharmacy:  [phone]  Same Day Surgery:  [phone], Monday - Friday until 8:30 p.m.  Urgent Care:  [phone]  Emergency Room:  [phone]                                                                                 [facility]:  [phone] option 1  -  Dr. Interiano

## 2018-12-18 NOTE — ANESTHESIA POSTPROCEDURE EVALUATION
Patient: Carla Hong    Procedure(s):  1.  Lapidus bunionectomy right foot  2. hammertoe correction third toe right foot    Diagnosis:Hallux valgus right foot  Diagnosis Additional Information: No value filed.    Anesthesia Type:  Periph. Nerve Block for postop pain, General    Note:  Anesthesia Post Evaluation    Patient location during evaluation: Phase 2 and Bedside  Patient participation: Able to participate in evaluation but full recovery from regional anesthesia has not yet ocurrred but is anticipated to occur within 48 hours  Level of consciousness: awake and alert  Pain management: adequate  Airway patency: patent  Cardiovascular status: acceptable  Respiratory status: acceptable  Hydration status: acceptable  PONV: none     Anesthetic complications: None          Last vitals:  Vitals:    12/18/18 0718 12/18/18 0934   BP: 108/79 (!) 86/61   Resp: 18 14   Temp: 36.6  C (97.8  F)    SpO2: 99% 96%         Electronically Signed By: Ariel Frankel CRNA, APRN CRNA  December 18, 2018  9:44 AM

## 2018-12-18 NOTE — ANESTHESIA PREPROCEDURE EVALUATION
Anesthesia Pre-Procedure Evaluation    Patient: Carla Hong   MRN: [id number] : 1955          Preoperative Diagnosis: Hallux valgus right foot    Procedure(s):  Lapidus bunionectomy right foot. Regional block.    Past Medical History:   Diagnosis Date     Vulvar cancer (H)      Past Surgical History:   Procedure Laterality Date     COLONOSCOPY  2010    COLONOSCOPY performed by RYAN OSEGUERA at [facility]     VULVECTOMY RADICAL DISSECT GROIN(S)  2012    Procedure: VULVECTOMY RADICAL DISSECT GROIN(S);  Radical Vulvectomy Bilateral Inginal Femoral Lymph Node Dissection.;  Surgeon: Henrry Granger MD;  Location: [facility] OR       Anesthesia Evaluation     . Pt has had prior anesthetic. Type: General and MAC    No history of anesthetic complications          ROS/MED HX    ENT/Pulmonary: Comment: dysphonia      Neurologic:  - neg neurologic ROS     Cardiovascular:  - neg cardiovascular ROS       METS/Exercise Tolerance:  >4 METS   Hematologic:  - neg hematologic  ROS       Musculoskeletal:         GI/Hepatic:  - neg GI/hepatic ROS       Renal/Genitourinary:  - ROS Renal section negative       Endo:  - neg endo ROS       Psychiatric:     (+) psychiatric history depression      Infectious Disease:  - neg infectious disease ROS       Malignancy:   (+) Malignancy History of Other  Other CA vulvar status post         Other:    - neg other ROS                      Physical Exam  Normal systems: cardiovascular, pulmonary and dental    Airway   Mallampati: II  TM distance: >3 FB  Neck ROM: full    Dental     Cardiovascular   Rhythm and rate: regular and normal      Pulmonary    breath sounds clear to auscultation            Lab Results   Component Value Date    WBC 8.0 2018    HGB 13.2 2018    HCT 39.6 2018     2018     2018    POTASSIUM 4.0 2018    CHLORIDE 103 2018    CO2 27 2018    BUN 11 2018    CR 0.74 2018    GLC 93 2018  "   ONDINA 8.7 11/26/2018    INR 1.10 01/19/2013    TSH 0.71 12/12/2016       Preop Vitals  BP Readings from Last 3 Encounters:   11/26/18 127/86   06/18/18 117/79   01/05/18 112/77    Pulse Readings from Last 3 Encounters:   11/26/18 65   06/18/18 70   01/05/18 61      Resp Readings from Last 3 Encounters:   01/05/18 17   02/15/13 16   02/14/13 16    SpO2 Readings from Last 3 Encounters:   01/05/18 97%   12/13/17 97%   05/31/16 96%      Temp Readings from Last 1 Encounters:   01/05/18 36.8  C (98.2  F) (Oral)    Ht Readings from Last 1 Encounters:   11/26/18 1.727 m (5' 8\")      Wt Readings from Last 1 Encounters:   11/26/18 68.2 kg (150 lb 4 oz)    Estimated body mass index is 22.85 kg/m  as calculated from the following:    Height as of 11/26/18: 1.727 m (5' 8\").    Weight as of 11/26/18: 68.2 kg (150 lb 4 oz).       Anesthesia Plan      History & Physical Review  History and physical reviewed and following examination; no interval change.    ASA Status:  2 .    NPO Status:  > 8 hours    Plan for Periph. Nerve Block for postop pain and General with Propofol induction. Maintenance will be TIVA.    PONV prophylaxis:  Ondansetron (or other 5HT-3) and Dexamethasone or Solumedrol  TIVA + Right popliteal nerve block.      Postoperative Care  Postoperative pain management:  IV analgesics, Oral pain medications and Peripheral nerve block (Single Shot).      Consents  Anesthetic plan, risks, benefits and alternatives discussed with:  Patient..                 Ariel Frankel CRNA, APRN CRNA  "

## 2018-12-18 NOTE — BRIEF OP NOTE
SURGEON: QIANA Interiano DPM, FACFAS    ASSISTANT: none    PREOP DIAGNOSIS: 1. Hallux valgus right foot  2. Hammertoe third toe right foot    POSTOP DIAGNOSIS: same as above    PROCEDURE: 1. Lapidus bunionectomy right foot  2. Hammertoe correction third toe rightfoot    PATHOLOGY: none    ANESTHESIA: Local MAC with popliteal block    HEMOSTASIS: Pneumatic ankle Tourniquet 250 psi    INJECTABLE: 0 cc Buffered 1% Lidocaine plain; 0 cc 0.5% Marcaine plain    BLOOD LOSS: 10 cc.    CONDITION: Vital signs are stable and vascular status intact the the lower extremities.

## 2018-12-18 NOTE — ANESTHESIA CARE TRANSFER NOTE
Patient: Carla Hong    Procedure(s):  1.  Lapidus bunionectomy right foot  2. hammertoe correction third toe right foot    Diagnosis: Hallux valgus right foot  Diagnosis Additional Information: No value filed.    Anesthesia Type:   Periph. Nerve Block for postop pain, General     Note:  Airway :Face Mask  Patient transferred to:Phase II  Handoff Report: Identifed the Patient, Identified the Reponsible Provider, Reviewed the pertinent medical history, Discussed the surgical course, Reviewed Intra-OP anesthesia mangement and issues during anesthesia, Set expectations for post-procedure period and Allowed opportunity for questions and acknowledgement of understanding      Vitals: (Last set prior to Anesthesia Care Transfer)    CRNA VITALS  12/18/2018 0857 - 12/18/2018 0927      12/18/2018             Pulse:  67    SpO2:  98 %                Electronically Signed By: Ariel Frankel CRNA, APRN CRNA  December 18, 2018  9:27 AM

## 2018-12-18 NOTE — OP NOTE
PREOPERATIVE DIAGNOSIS: 1.  Hallux valgus, right foot.   POSTOPERATIVE DIAGNOSIS: 1. Hallux valgus, right foot.   PROCEDURE: 1. Lapidus bunionectomy, right foot.   PATHOLOGY: None.   ANESTHESIA: Local with popliteal block   ESTIMATED BLOOD LOSS: Less than 10 mL   INDICATIONS FOR PROCEDURE: Carla Hong is a 63 year old-year-old female with a painful bunion deformity of the right foot. The patient was consented for a Lapidus bunionectomy, right foot.   PROCEDURE IN DETAIL: Under mild sedation, the patient in the operating room and placed on the operating table in the supine position. Pneumatic ankle tourniquet was placed around the patient's right ankle.  The foot was then scrubbed, prepped and draped in the usual aseptic manner. Esmarch bandage was utilized to exsanguinate the patient's right lower extremity, and the pneumatic ankle tourniquet was inflated to 250 PSI.   Following this, an operative time out was performed according to our institution's policy which confirmed the laterality of the procedure. Preoperative antibiotics were administered to the patient prior to arrival to the OR.     The procedure began by making a linear longitudinal incision over the medial aspect of the first metatarsophalangeal joint on the right foot. The incision was carried full thickness down to subcutaneous utilizing sharp and blunt dissection. Care was taken to identify and retract all neurovascular structures. All active bleeders were ligated and cauterized as necessary. Further dissection was carried down to the capsule where a similar capsulotomy was performed over the medial aspect of the first metatarsophalangeal joint. All capsule attachments dissected away from the head of the first metatarsal. Next, utilizing a sagittal saw, the dorsal medial eminence was resected in toto and passed from the operative field. Attention was then directed to the first interspace via a dorsal incision where a lateral release was  performed of the collateral ligaments and conjoined tendon of the first metatarsophalangeal joint. The sesamoid apparatus was noted to move back in a more corrected position. The attention was then directed back to the medial aspect of the first metatarsal base where a linear longitudinal incision over the dorsomedial aspect of the first metatarsocuneiform joint on the right foot. The incision was carried full thickness down to subcutaneous utilizing sharp and blunt dissection. Care was taken to identify and retract all neurovascular structures. All active bleeders were ligated and cauterized as necessary. Further dissection was carried down to the capsule where a similar capsulotomy was performed over the dorsomedial aspect of the first metatarsocuneiform joint. Next, all hyaline cartilage was removed with a bone curette from the joint surfaces.  The subchondral bone was drilled in multiple areas on both sides of the joint to promote bone healing.  Next the first metatarsal was aligned in a normal anatomical position reducing the first IM angle and internally rotating the metatarsal to reduce the valgus position.  The correction was temporarily secured with a guide wire.  The Mini C-arm was used to visualize proper alignment.  An Arthrex 4.0 headless compression screw was placed across the joint with excellent compression and stability noted.  An Arthrex Lapidus plate was placed over the medial aspect of the first metatarsocuneiform joint with a combination of compression and locking screws.  The Mini C-arm was used to visualize proper alignment and placement of hardware.  Attention was directed to the dorsal aspect of the third digit on the right  where an eliptical incision was made over the dorsal aspect of the second distal interphalangeal joint on the right. The incision was carried full thickness down to subcutaneous tissue utilizing sharp and blunt dissection. Care was taken to identify and retract all vital  neurovascular structures. All active bleeders were ligated and cauterized as necessary. Next, a capsulotomy tenotomy was performed, proximal interphalangeal joint third digit. The head of the mid  phalanx was resected utilizing a sagittal saw. The wound was irrigated with copious amounts of normal sterile saline.    The wound was irrigated with copious amounts of normal sterile saline. Tourniquet was deflated and prompt hyperemic response was noted to all five digits of the right foot. The capsule and fascia was reapproximated utilizing 3-0 Vicryl. The skin was reapproximated utilizing 4-0 nylon suture in a continuous running interlocking fashion. The wound was dressed with sterile Arthrex Jumpstart dressing, 4 x 4s, cast padding and an Ace wrap.     The patient tolerated these procedures well and was transferred from the operative table to the transport cart and taken from the OR to the PACU.  In PACU, patient and staff given orders and instructions for post-operative care in the following areas: post op pain management, weight-bearing status, dressing/splint care, weight-bearing assistive devices, elevation of the extremity, ice/cold therapy, DVT/blood clot prevention, nutrition and post-operative concerns to be aware.  Case and post-operative care measures were reviewed with the patient and family members present.  A post operative instruction sheet was provided.  If concerns or questions arise they will contact our clinic.  If acute concerns develop they will present emergently to a nearby medical center.      TERRELL AYERS DPM, FACFAS

## 2018-12-27 ENCOUNTER — ANCILLARY PROCEDURE (OUTPATIENT)
Dept: GENERAL RADIOLOGY | Facility: CLINIC | Age: 63
End: 2018-12-27
Attending: PODIATRIST
Payer: COMMERCIAL

## 2018-12-27 ENCOUNTER — TELEPHONE (OUTPATIENT)
Dept: PODIATRY | Facility: CLINIC | Age: 63
End: 2018-12-27

## 2018-12-27 ENCOUNTER — OFFICE VISIT (OUTPATIENT)
Dept: PODIATRY | Facility: CLINIC | Age: 63
End: 2018-12-27
Payer: COMMERCIAL

## 2018-12-27 VITALS
HEART RATE: 81 BPM | WEIGHT: 150 LBS | BODY MASS INDEX: 22.73 KG/M2 | SYSTOLIC BLOOD PRESSURE: 117 MMHG | HEIGHT: 68 IN | DIASTOLIC BLOOD PRESSURE: 74 MMHG

## 2018-12-27 DIAGNOSIS — M20.11 HALLUX VALGUS (ACQUIRED), RIGHT FOOT: ICD-10-CM

## 2018-12-27 DIAGNOSIS — M79.671 RIGHT FOOT PAIN: Primary | ICD-10-CM

## 2018-12-27 DIAGNOSIS — M79.671 RIGHT FOOT PAIN: ICD-10-CM

## 2018-12-27 DIAGNOSIS — Z98.890 S/P FOOT SURGERY, RIGHT: ICD-10-CM

## 2018-12-27 PROCEDURE — 99024 POSTOP FOLLOW-UP VISIT: CPT | Performed by: PODIATRIST

## 2018-12-27 PROCEDURE — 73630 X-RAY EXAM OF FOOT: CPT | Mod: RT

## 2018-12-27 RX ORDER — OXYCODONE AND ACETAMINOPHEN 5; 325 MG/1; MG/1
1 TABLET ORAL EVERY 6 HOURS PRN
Qty: 12 TABLET | Refills: 0 | Status: SHIPPED | OUTPATIENT
Start: 2018-12-27 | End: 2019-02-22

## 2018-12-27 ASSESSMENT — MIFFLIN-ST. JEOR: SCORE: 1287.31

## 2018-12-27 NOTE — TELEPHONE ENCOUNTER
[facility] is requesting a refill on patient's Oxcycodone-Acetaminophen 5-325 tabs, quantity 30.    Please advise.  Thank you,  Mark KIM, CMA

## 2018-12-27 NOTE — NURSING NOTE
"Chief Complaint   Patient presents with     Surgical Followup     DOS 12/18/2018, Lapidus bunionectomy, right foot.       Initial /74 (BP Location: Right arm, Patient Position: Chair, Cuff Size: Adult Regular)   Pulse 81   Ht 1.725 m (5' 7.9\")   Wt 68 kg (150 lb)   LMP 03/13/2007   BMI 22.87 kg/m   Estimated body mass index is 22.87 kg/m  as calculated from the following:    Height as of this encounter: 1.725 m (5' 7.9\").    Weight as of this encounter: 68 kg (150 lb).  Medications and allergies reviewed.      Yadira KIM MA    "

## 2018-12-27 NOTE — PROGRESS NOTES
"Carla presents to the office s/p one week Lapidus bunionectomy of the right foot .  The patient relates keeping the bandages clean, dry and intact.  The patient relates good compliance with postoperative instructions.  The patient denies any severe pain, fevers, chills, nausea or vomiting.      /74 (BP Location: Right arm, Patient Position: Chair, Cuff Size: Adult Regular)   Pulse 81   Ht 1.725 m (5' 7.9\")   Wt 68 kg (150 lb)   LMP 03/13/2007   BMI 22.87 kg/m        Physical Exam:    General: The patient appears to be in no distress and in good spirits.  The bandages appear clean, dry and intact with no strikethrough noted. Vascular exam: Neurovascular status unchanged with < 3 sec capillary refill to all digits.  Positive pedal pulses and epicritic sensations intact with no evidence of allodynia.  One notes moderate edema to the forefoot on the right.  Sutures are intact and the skin is well coapted with no erythema noted.      Radiograph:  AP, lateral and medial oblique evaluation of right foot reveals surgical correction of first metatarsal cuneiform joint with hardware properly placed and intact.      Assessment: Hallux valgus status post one week Lapidus bunionectomy of the right foot.    Plan:  Sutures remain intact.  A sterile dressing was reapplied.  The patient was instructed to continue non-weightbearing to the right foot.  The patient is to keep the dressings clean, dry and intact and to continue with elevation of the right foot.  The patient will return to the office in one week for suture removal.    Disclaimer: This note consists of symbols derived from keyboarding, dictation and/or voice recognition software. As a result, there may be errors in the script that have gone undetected. Please consider this when interpreting information found in this chart.       QIANA Interiano D.P.M., FJOSE.F.A.S.    "

## 2018-12-27 NOTE — LETTER
"    12/27/2018         RE: Carla Hong  [address] [facility] [address]        Dear Colleague,    Thank you for referring your patient, Carla Hong, to the [facility]. Please see a copy of my visit note below.    Carla presents to the office s/p one week Lapidus bunionectomy of the right foot .  The patient relates keeping the bandages clean, dry and intact.  The patient relates good compliance with postoperative instructions.  The patient denies any severe pain, fevers, chills, nausea or vomiting.      /74 (BP Location: Right arm, Patient Position: Chair, Cuff Size: Adult Regular)   Pulse 81   Ht 1.725 m (5' 7.9\")   Wt 68 kg (150 lb)   LMP 03/13/2007   BMI 22.87 kg/m         Physical Exam:    General: The patient appears to be in no distress and in good spirits.  The bandages appear clean, dry and intact with no strikethrough noted. Vascular exam: Neurovascular status unchanged with < 3 sec capillary refill to all digits.  Positive pedal pulses and epicritic sensations intact with no evidence of allodynia.  One notes moderate edema to the forefoot on the right.  Sutures are intact and the skin is well coapted with no erythema noted.      Radiograph:  AP, lateral and medial oblique evaluation of right foot reveals surgical correction of first metatarsal cuneiform joint with hardware properly placed and intact.      Assessment: Hallux valgus status post one week Lapidus bunionectomy of the right foot.    Plan:  Sutures remain intact.  A sterile dressing was reapplied.  The patient was instructed to continue non-weightbearing to the right foot.  The patient is to keep the dressings clean, dry and intact and to continue with elevation of the right foot.  The patient will return to the office in one week for suture removal.    Disclaimer: This note consists of symbols derived from keyboarding, dictation and/or voice recognition software. As a result, " there may be errors in the script that have gone undetected. Please consider this when interpreting information found in this chart.       QIANA Interiano D.P.M., F.A.C.F.A.S.      Again, thank you for allowing me to participate in the care of your patient.        Sincerely,        Saqib Interiano DPM

## 2019-01-03 ENCOUNTER — TELEPHONE (OUTPATIENT)
Dept: PODIATRY | Facility: CLINIC | Age: 64
End: 2019-01-03

## 2019-01-03 ENCOUNTER — OFFICE VISIT (OUTPATIENT)
Dept: PODIATRY | Facility: CLINIC | Age: 64
End: 2019-01-03
Payer: COMMERCIAL

## 2019-01-03 VITALS — RESPIRATION RATE: 16 BRPM | BODY MASS INDEX: 22.73 KG/M2 | HEIGHT: 68 IN | WEIGHT: 150 LBS

## 2019-01-03 DIAGNOSIS — Z98.890 S/P FOOT SURGERY, RIGHT: Primary | ICD-10-CM

## 2019-01-03 PROCEDURE — 99024 POSTOP FOLLOW-UP VISIT: CPT | Performed by: PODIATRIST

## 2019-01-03 ASSESSMENT — MIFFLIN-ST. JEOR: SCORE: 1287.31

## 2019-01-03 NOTE — LETTER
"    1/3/2019         RE: Carla Hong  [address] [facility] [address]        Dear Colleague,    Thank you for referring your patient, Carla Hong, to the [facility]. Please see a copy of my visit note below.    Carla presents to the office s/p 2 weeks Lapidus bunionectomy of the right foot .  The patient relates keeping the bandages clean, dry and intact.  The patient relates good compliance with postoperative instructions.  The patient denies any severe pain, fevers, chills, nausea or vomiting.      Resp 16   Ht 1.725 m (5' 7.9\")   Wt 68 kg (150 lb)   LMP 03/13/2007   BMI 22.87 kg/m         Physical Exam:    General: The patient appears to be in no distress and in good spirits.  The bandages appear clean, dry and intact with no strikethrough noted. Vascular exam: Neurovascular status unchanged with < 3 sec capillary refill to all digits.  Positive pedal pulses and epicritic sensations intact with no evidence of allodynia.  One notes moderate edema to the forefoot on the right.  Sutures are intact and the skin is well coapted with no erythema noted.           Assessment: Hallux valgus status post 2 weeks Lapidus bunionectomy of the right foot.    Plan:  Sutures remain intact.  A sterile dressing was reapplied.  The patient was instructed to continue non-weightbearing to the right foot.  The patient is to keep the dressings clean, dry and intact for 3 days.  Patient return in 1 month for reevaluation and repeat x-rays.      Disclaimer: This note consists of symbols derived from keyboarding, dictation and/or voice recognition software. As a result, there may be errors in the script that have gone undetected. Please consider this when interpreting information found in this chart.       JUDY Jones.P.M., F.A.C.F.A.S.      Again, thank you for allowing me to participate in the care of your patient.        Sincerely,        Saqib Interiano, KANDY    "

## 2019-01-03 NOTE — NURSING NOTE
"Chief Complaint   Patient presents with     Surgical Followup     Lapidus bunionectomy, right foot DOS 12/18/2018 - suture removal today       Initial Resp 16   Ht 1.725 m (5' 7.9\")   Wt 68 kg (150 lb)   LMP 03/13/2007   BMI 22.87 kg/m   Estimated body mass index is 22.87 kg/m  as calculated from the following:    Height as of this encounter: 1.725 m (5' 7.9\").    Weight as of this encounter: 68 kg (150 lb).  Medications and allergies reviewed.    Mark KIM, SOURAV    "

## 2019-01-03 NOTE — PROGRESS NOTES
"Carla presents to the office s/p 2 weeks Lapidus bunionectomy of the right foot .  The patient relates keeping the bandages clean, dry and intact.  The patient relates good compliance with postoperative instructions.  The patient denies any severe pain, fevers, chills, nausea or vomiting.      Resp 16   Ht 1.725 m (5' 7.9\")   Wt 68 kg (150 lb)   LMP 03/13/2007   BMI 22.87 kg/m        Physical Exam:    General: The patient appears to be in no distress and in good spirits.  The bandages appear clean, dry and intact with no strikethrough noted. Vascular exam: Neurovascular status unchanged with < 3 sec capillary refill to all digits.  Positive pedal pulses and epicritic sensations intact with no evidence of allodynia.  One notes moderate edema to the forefoot on the right.  Sutures are intact and the skin is well coapted with no erythema noted.           Assessment: Hallux valgus status post 2 weeks Lapidus bunionectomy of the right foot.    Plan:  Sutures remain intact.  A sterile dressing was reapplied.  The patient was instructed to continue non-weightbearing to the right foot.  The patient is to keep the dressings clean, dry and intact for 3 days.  Patient return in 1 month for reevaluation and repeat x-rays.      Disclaimer: This note consists of symbols derived from keyboarding, dictation and/or voice recognition software. As a result, there may be errors in the script that have gone undetected. Please consider this when interpreting information found in this chart.       QIANA Interiano D.P.M., FJOSE.F.A.S.    "

## 2019-01-03 NOTE — TELEPHONE ENCOUNTER
Reason for Call:  Form, our goal is to have forms completed with 7 days, however, some forms may require a visit or additional information.    Type of letter, form or note:  STD - Physician Statement    Who is the form from?: Employer, STD insurance    Where did the form come from: form was faxed in    Phone number of person requesting form:  [phone], Louisa Duval  Can we leave a detailed message on this number:  YES    Desired completion date of form: ASAP      How will form be returned?:  fax to [phone] (Attn Louisa)    Has the patient signed a consent form for release of information (may be included with form)? YES    Additional comments:     Form was started and place in Provider Basket for provider review/ completion at [facility].     Iban Monae ATC

## 2019-01-25 ENCOUNTER — ANCILLARY PROCEDURE (OUTPATIENT)
Dept: GENERAL RADIOLOGY | Facility: CLINIC | Age: 64
End: 2019-01-25
Payer: COMMERCIAL

## 2019-01-25 ENCOUNTER — OFFICE VISIT (OUTPATIENT)
Dept: PODIATRY | Facility: CLINIC | Age: 64
End: 2019-01-25
Payer: COMMERCIAL

## 2019-01-25 VITALS
SYSTOLIC BLOOD PRESSURE: 133 MMHG | BODY MASS INDEX: 22.73 KG/M2 | HEART RATE: 94 BPM | HEIGHT: 68 IN | WEIGHT: 150 LBS | DIASTOLIC BLOOD PRESSURE: 74 MMHG

## 2019-01-25 DIAGNOSIS — Z98.890 S/P FOOT SURGERY, RIGHT: Primary | ICD-10-CM

## 2019-01-25 PROCEDURE — 73630 X-RAY EXAM OF FOOT: CPT | Mod: RT

## 2019-01-25 PROCEDURE — 99024 POSTOP FOLLOW-UP VISIT: CPT | Performed by: PODIATRIST

## 2019-01-25 ASSESSMENT — MIFFLIN-ST. JEOR: SCORE: 1287.31

## 2019-01-25 NOTE — LETTER
1/25/2019         RE: Carla Hong  [address] [facility] [address]        Dear Colleague,    Thank you for referring your patient, Carla Hong, to the [facility]. Please see a copy of my visit note below.    Carla returns to the office for reevaluation of the right foot.  The patient relates following the instructions given at the last visit with noted less pain.  The patient relates overall more  improvement in pain and function of the right foot.  The patient relates no other problems.    PAST MEDICAL HISTORY:   Past Medical History:   Diagnosis Date     Vulvar cancer (H) 11/12       BMI= There is no height or weight on file to calculate BMI.      Physical Exam:    General: The patient appears to have a pleasant mental affect.    Lower extremity physical exam:  Neurovascular status is intact with palpable pedal pulses and intact epicritic sensations.  Muscular exam is within normal limits to major muscle groups.  Integument is intact.      One notes decreased edema.  One notes superficial wound on the proximal aspect of the incision with no surrounding erythema or drainage.    Radiograph evaluation including weightbearing AP, lateral and medial oblique views of the right foot reveals interval healing with increased trabeculation of the first metatarsocuneiform joint with hardware intact.    Assessment:      ICD-10-CM    1. S/P foot surgery, right Z98.890        Plan:  I have explained to Carla about the conditions.  At this time, the patient will initial weightbearing. She was referred to the [facility] for first metatarsophalangeal joint range of motion improvement.  The patient will return in one month for reevaluation and repeat x-rays.    Disclaimer: This note consists of symbols derived from keyboarding, dictation and/or voice recognition software. As a result, there may be errors in the script that have gone undetected. Please consider this when  interpreting information found in this chart.       QIANA Interiano D.P.M., FJOSE.F.A.S.      Again, thank you for allowing me to participate in the care of your patient.        Sincerely,        Saqib Interiano DPM

## 2019-01-25 NOTE — NURSING NOTE
"Chief Complaint   Patient presents with     Surgical Followup     DOS 12/18/18, Bunionectomy Rt FT, XR today       Initial /74 (BP Location: Left arm, Patient Position: Chair, Cuff Size: Adult Regular)   Pulse 94   Ht 1.725 m (5' 7.9\")   Wt 68 kg (150 lb)   LMP 03/13/2007   BMI 22.87 kg/m   Estimated body mass index is 22.87 kg/m  as calculated from the following:    Height as of this encounter: 1.725 m (5' 7.9\").    Weight as of this encounter: 68 kg (150 lb).  Medications and allergies reviewed.      Yadira KIM MA    "

## 2019-01-25 NOTE — LETTER
January 25, 2019      Carla Hong  [address] [facility] [address]        To Whom It May Concern:    Carla Hong was seen in our clinic. She may return to work with the following: Work from home for 2 weeks on or about Monday, Jan. 28.      Sincerely,        Saqib Interiano DPM

## 2019-01-25 NOTE — PROGRESS NOTES
Carla returns to the office for reevaluation of the right foot.  The patient relates following the instructions given at the last visit with noted less pain.  The patient relates overall more  improvement in pain and function of the right foot.  The patient relates no other problems.    PAST MEDICAL HISTORY:   Past Medical History:   Diagnosis Date     Vulvar cancer (H) 11/12       BMI= There is no height or weight on file to calculate BMI.      Physical Exam:    General: The patient appears to have a pleasant mental affect.    Lower extremity physical exam:  Neurovascular status is intact with palpable pedal pulses and intact epicritic sensations.  Muscular exam is within normal limits to major muscle groups.  Integument is intact.      One notes decreased edema.  One notes superficial wound on the proximal aspect of the incision with no surrounding erythema or drainage.    Radiograph evaluation including weightbearing AP, lateral and medial oblique views of the right foot reveals interval healing with increased trabeculation of the first metatarsocuneiform joint with hardware intact.    Assessment:      ICD-10-CM    1. S/P foot surgery, right Z98.890        Plan:  I have explained to Carla about the conditions.  At this time, the patient will initial weightbearing. She was referred to the [facility] for first metatarsophalangeal joint range of motion improvement.  The patient will return in one month for reevaluation and repeat x-rays.    Disclaimer: This note consists of symbols derived from keyboarding, dictation and/or voice recognition software. As a result, there may be errors in the script that have gone undetected. Please consider this when interpreting information found in this chart.       QIANA Interiano D.P.M., FJANAY.CHRIS.F.A.S.

## 2019-01-29 ENCOUNTER — THERAPY VISIT (OUTPATIENT)
Dept: PHYSICAL THERAPY | Facility: CLINIC | Age: 64
End: 2019-01-29
Payer: COMMERCIAL

## 2019-01-29 DIAGNOSIS — Z98.890 S/P FOOT SURGERY, RIGHT: ICD-10-CM

## 2019-01-29 PROCEDURE — 97110 THERAPEUTIC EXERCISES: CPT | Mod: GP | Performed by: PHYSICAL THERAPIST

## 2019-01-29 NOTE — PROGRESS NOTES
[facility] Initial Evaluation  Subjective:  The history is provided by the patient.   Carla Hong is a 63 year old female with a right foot condition.  Condition occurred with:  Degenerative joint disease.    This is a new condition  Pt underwent bunionectomy on R foot on 12/18/18. Pt is currently WBAT. Notes pain in R first ray as well as numbness/tingling. Pain worse at night when trying to sleep over incision and surgical area. 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    Patient reports pain:  Great toe.    Pain is described as aching  and reported as 3/10.  Associated symptoms:  Loss of motion/stiffness, numbness and tingling.              General health as reported by patient is good.  Pertinent medical history includes:  Cancer and implanted devices.  Medical allergies: no.    Medication history: Bone density supplements.  Current occupation is Assist stacy at [employer].                                    Objective:    Gait:    Gait Type:  Antalgic   Weight Bearing Status:  WBAT     Deviations:  General Deviations:  Chelo decr, toe out R and stance time decr          Ankle/Foot Evaluation  ROM:    AROM:            Great toe flexion: Left:      Right:  15  Great Toe Extension: Left:      Right: 30    Strength:    Dorsiflexion:  Right: 5-/5   Pain:  Plantarflexion: Right: 5/5  Pain:      Flexion Great Toe:Right: 4+/5   Pain:  Extension Great Toe:Right: 4+/5  Pain:                    EDEMA:     Right ankle edema present at:  general                                                                General Evaluation:                      Balance:  Balance wnl general: Feet together eyes closed 30 sec.                                                       ROS    Assessment/Plan:    Patient is a 63 year old female with R foot complaints.    Patient has the following significant findings with corresponding treatment plan.                Diagnosis 1:  S/p R bunionectomy  Pain -  hot/cold  therapy, US, electric stimulation, mechanical traction, manual therapy, splint/taping/bracing/orthotics, self management, education, directional preference exercise and home program  Decreased ROM/flexibility - manual therapy, therapeutic exercise and home program  Decreased joint mobility - manual therapy, therapeutic exercise and home program  Decreased strength - therapeutic exercise, therapeutic activities and home program  Impaired balance - neuro re-education, therapeutic activities and home program  Edema - vasopneumatics, electric stimulation, cold therapy, cryocuff and self management/home program  Impaired gait - gait training and home program  Impaired muscle performance - neuro re-education and home program  Decreased function - therapeutic activities and home program    Therapy Evaluation Codes:   1) History comprised of:   Personal factors that impact the plan of care:      None.    Comorbidity factors that impact the plan of care are:      Cancer and Implanted device.     Medications impacting care: Bone density.  2) Examination of Body Systems comprised of:   Body structures and functions that impact the plan of care:      R foot.   Activity limitations that impact the plan of care are:      Bending, Driving, Lifting, Stairs, Standing, Walking and Sleeping.  3) Clinical presentation characteristics are:   Stable/Uncomplicated.  4) Decision-Making    Low complexity using standardized patient assessment instrument and/or measureable assessment of functional outcome.  Cumulative Therapy Evaluation is: Low complexity.    Previous and current functional limitations:  (See Goal Flow Sheet for this information)    Short term and Long term goals: (See Goal Flow Sheet for this information)     Communication ability:  Patient appears to be able to clearly communicate and understand verbal and written communication and follow directions correctly.  Treatment Explanation - The following has been discussed with  the patient:   RX ordered/plan of care  Anticipated outcomes  Possible risks and side effects  This patient would benefit from PT intervention to resume normal activities.   Rehab potential is good.    Frequency:  2 X week, once daily  Duration:  for 6 weeks  Discharge Plan:  Achieve all LTG.  Independent in home treatment program.  Reach maximal therapeutic benefit.    Please refer to the daily flowsheet for treatment today, total treatment time and time spent performing 1:1 timed codes.

## 2019-02-01 ENCOUNTER — THERAPY VISIT (OUTPATIENT)
Dept: PHYSICAL THERAPY | Facility: CLINIC | Age: 64
End: 2019-02-01
Payer: COMMERCIAL

## 2019-02-01 DIAGNOSIS — M79.674 PAIN OF TOE OF RIGHT FOOT: ICD-10-CM

## 2019-02-01 DIAGNOSIS — Z98.890 S/P BUNIONECTOMY: ICD-10-CM

## 2019-02-01 PROCEDURE — 97116 GAIT TRAINING THERAPY: CPT | Mod: GP | Performed by: PHYSICAL THERAPIST

## 2019-02-01 PROCEDURE — 97112 NEUROMUSCULAR REEDUCATION: CPT | Mod: GP | Performed by: PHYSICAL THERAPIST

## 2019-02-01 PROCEDURE — 97110 THERAPEUTIC EXERCISES: CPT | Mod: GP | Performed by: PHYSICAL THERAPIST

## 2019-02-12 ENCOUNTER — TELEPHONE (OUTPATIENT)
Dept: PHYSICAL THERAPY | Facility: CLINIC | Age: 64
End: 2019-02-12

## 2019-02-12 NOTE — TELEPHONE ENCOUNTER
Called pt to continue with PT services. Educated on importance of continued rehab and activity. Left message to call clinic and schedule more apts.    Thaddeus Leal, PT on 2/12/2019 at 10:39 AM

## 2019-02-15 ENCOUNTER — THERAPY VISIT (OUTPATIENT)
Dept: PHYSICAL THERAPY | Facility: CLINIC | Age: 64
End: 2019-02-15
Payer: COMMERCIAL

## 2019-02-15 DIAGNOSIS — Z98.890 S/P BUNIONECTOMY: ICD-10-CM

## 2019-02-15 DIAGNOSIS — M79.674 PAIN OF TOE OF RIGHT FOOT: ICD-10-CM

## 2019-02-15 PROCEDURE — 97530 THERAPEUTIC ACTIVITIES: CPT | Mod: GP | Performed by: PHYSICAL THERAPIST

## 2019-02-15 PROCEDURE — 97110 THERAPEUTIC EXERCISES: CPT | Mod: GP | Performed by: PHYSICAL THERAPIST

## 2019-02-18 ENCOUNTER — TELEPHONE (OUTPATIENT)
Dept: PODIATRY | Facility: CLINIC | Age: 64
End: 2019-02-18

## 2019-02-18 NOTE — TELEPHONE ENCOUNTER
I recommended that she apply a topical antibiotic such as Neosporin over the incision along with a large bandage.  The patient may soak her foot in Dreft detergent water on a daily basis.  Return to the office if any increased redness or drainage is noted.

## 2019-02-18 NOTE — TELEPHONE ENCOUNTER
Called Pt and there was no answer. Her VM stated her first and last name. I left her a detailed message stating that Dr. Interiano recommends applying a topical antibiotic such as Neosporin over the incision along with a large bandage. He also recommended that the PT may soak her foot in Dreft detergent water on a daily basis.  Return to the office if any increased redness or drainage is noted.    Yadira Quan MA on 2/18/2019 at 5:41 PM

## 2019-02-18 NOTE — TELEPHONE ENCOUNTER
Reason for Call:  Other incision tenderness    Detailed comments: Pt states her incision is very tender, still has blisters, what can she put on it? Please call her    Phone Number Patient can be reached at: Home number on file [phone] (home)    Best Time: today    Can we leave a detailed message on this number? YES    Call taken on 2/18/2019 at 9:02 AM by Jennifer Bardales

## 2019-02-19 ENCOUNTER — THERAPY VISIT (OUTPATIENT)
Dept: PHYSICAL THERAPY | Facility: CLINIC | Age: 64
End: 2019-02-19
Payer: COMMERCIAL

## 2019-02-19 DIAGNOSIS — Z98.890 S/P BUNIONECTOMY: ICD-10-CM

## 2019-02-19 DIAGNOSIS — M79.674 PAIN OF TOE OF RIGHT FOOT: ICD-10-CM

## 2019-02-19 PROCEDURE — 97110 THERAPEUTIC EXERCISES: CPT | Mod: GP | Performed by: PHYSICAL THERAPIST

## 2019-02-19 PROCEDURE — 97530 THERAPEUTIC ACTIVITIES: CPT | Mod: GP | Performed by: PHYSICAL THERAPIST

## 2019-02-22 ENCOUNTER — ANCILLARY PROCEDURE (OUTPATIENT)
Dept: GENERAL RADIOLOGY | Facility: CLINIC | Age: 64
End: 2019-02-22
Attending: PODIATRIST
Payer: COMMERCIAL

## 2019-02-22 ENCOUNTER — OFFICE VISIT (OUTPATIENT)
Dept: PODIATRY | Facility: CLINIC | Age: 64
End: 2019-02-22
Payer: COMMERCIAL

## 2019-02-22 VITALS
WEIGHT: 150 LBS | SYSTOLIC BLOOD PRESSURE: 115 MMHG | DIASTOLIC BLOOD PRESSURE: 72 MMHG | BODY MASS INDEX: 22.73 KG/M2 | HEART RATE: 67 BPM | HEIGHT: 68 IN

## 2019-02-22 DIAGNOSIS — Z98.890 S/P FOOT SURGERY, RIGHT: Primary | ICD-10-CM

## 2019-02-22 DIAGNOSIS — Z98.890 S/P FOOT SURGERY, RIGHT: ICD-10-CM

## 2019-02-22 PROCEDURE — 73630 X-RAY EXAM OF FOOT: CPT | Mod: RT

## 2019-02-22 PROCEDURE — 99024 POSTOP FOLLOW-UP VISIT: CPT | Performed by: PODIATRIST

## 2019-02-22 ASSESSMENT — MIFFLIN-ST. JEOR: SCORE: 1282.31

## 2019-02-22 NOTE — NURSING NOTE
"Chief Complaint   Patient presents with     Surgical Followup     DOS 12/18/18, Lapidus bunionectomy, right foot. XR today, blistering and swelling around incision.       Initial /72 (BP Location: Left arm, Patient Position: Sitting, Cuff Size: Adult Regular)   Pulse 67   Ht 1.725 m (5' 7.9\")   Wt 68 kg (150 lb)   LMP 03/13/2007   BMI 22.87 kg/m   Estimated body mass index is 22.87 kg/m  as calculated from the following:    Height as of this encounter: 1.725 m (5' 7.9\").    Weight as of this encounter: 68 kg (150 lb).  Medications and allergies reviewed.      Yadira KIM MA    "

## 2019-02-22 NOTE — PROGRESS NOTES
Carla returns to the office for reevaluation of the right foot.  The patient relates following the instructions given at the last visit with noted less pain.  The patient relates overall more  improvement in pain and function of the right foot.  The patient relates no other problems.    PAST MEDICAL HISTORY:   Past Medical History:   Diagnosis Date     Vulvar cancer (H) 11/12       BMI= Body mass index is 22.87 kg/m .        Physical Exam:    General: The patient appears to have a pleasant mental affect.    Lower extremity physical exam:  Neurovascular status is intact with palpable pedal pulses and intact epicritic sensations.  Muscular exam is within normal limits to major muscle groups.  Integument is intact.      One notes decreased edema.  One notes decreased erythema.  Incision is well coapted with no drainage noted.  One notes improved but still limited range of motion of the first metatarsophalangeal joint on the right foot.  No surrounding erythema noted.    Radiograph evaluation including weightbearing AP, lateral and medial oblique views of the right foot reveals interval healing with increased trabeculation of the first metatarsal cuneiform joint with hardware intact    Assessment:      ICD-10-CM    1. S/P foot surgery, right Z98.890 XR Foot Right G/E 3 Views       Plan:  I have explained to Carla about the conditions.  At this time, the patient will continue with physical therapy with range of motion exercises and tissue mobilization.  The patient was instructed to return to the office if any problems arise.    Disclaimer: This note consists of symbols derived from keyboarding, dictation and/or voice recognition software. As a result, there may be errors in the script that have gone undetected. Please consider this when interpreting information found in this chart.       QIANA Interiano D.P.M., FJOSE.F.A.S.

## 2019-02-22 NOTE — LETTER
2/22/2019         RE: Carla Hong  [address] [facility] [address]        Dear Colleague,    Thank you for referring your patient, Carla Hong, to the [facility]. Please see a copy of my visit note below.    Carla returns to the office for reevaluation of the right foot.  The patient relates following the instructions given at the last visit with noted less pain.  The patient relates overall more  improvement in pain and function of the right foot.  The patient relates no other problems.    PAST MEDICAL HISTORY:   Past Medical History:   Diagnosis Date     Vulvar cancer (H) 11/12       BMI= Body mass index is 22.87 kg/m .        Physical Exam:    General: The patient appears to have a pleasant mental affect.    Lower extremity physical exam:  Neurovascular status is intact with palpable pedal pulses and intact epicritic sensations.  Muscular exam is within normal limits to major muscle groups.  Integument is intact.      One notes decreased edema.  One notes decreased erythema.  Incision is well coapted with no drainage noted.  One notes improved but still limited range of motion of the first metatarsophalangeal joint on the right foot.  No surrounding erythema noted.    Radiograph evaluation including weightbearing AP, lateral and medial oblique views of the right foot reveals interval healing with increased trabeculation of the first metatarsal cuneiform joint with hardware intact    Assessment:      ICD-10-CM    1. S/P foot surgery, right Z98.890 XR Foot Right G/E 3 Views       Plan:  I have explained to Carla about the conditions.  At this time, the patient will continue with physical therapy with range of motion exercises and tissue mobilization.  The patient was instructed to return to the office if any problems arise.    Disclaimer: This note consists of symbols derived from keyboarding, dictation and/or voice recognition software. As a result, there may be errors  in the script that have gone undetected. Please consider this when interpreting information found in this chart.       QIANA Interiano D.P.M., F.A.C.F.A.S.      Again, thank you for allowing me to participate in the care of your patient.        Sincerely,        Saqib Interiano DPM

## 2019-02-26 ENCOUNTER — THERAPY VISIT (OUTPATIENT)
Dept: PHYSICAL THERAPY | Facility: CLINIC | Age: 64
End: 2019-02-26
Payer: COMMERCIAL

## 2019-02-26 DIAGNOSIS — M79.674 PAIN OF TOE OF RIGHT FOOT: ICD-10-CM

## 2019-02-26 DIAGNOSIS — Z98.890 S/P BUNIONECTOMY: ICD-10-CM

## 2019-02-26 PROCEDURE — 97112 NEUROMUSCULAR REEDUCATION: CPT | Mod: GP | Performed by: PHYSICAL THERAPIST

## 2019-02-26 PROCEDURE — 97110 THERAPEUTIC EXERCISES: CPT | Mod: GP | Performed by: PHYSICAL THERAPIST

## 2019-03-07 ENCOUNTER — HOSPITAL ENCOUNTER (OUTPATIENT)
Dept: MAMMOGRAPHY | Facility: CLINIC | Age: 64
Discharge: HOME | End: 2019-03-07
Attending: FAMILY MEDICINE | Admitting: FAMILY MEDICINE
Payer: COMMERCIAL

## 2019-03-07 DIAGNOSIS — Z12.31 VISIT FOR SCREENING MAMMOGRAM: ICD-10-CM

## 2019-03-07 PROCEDURE — 77063 BREAST TOMOSYNTHESIS BI: CPT

## 2019-03-08 ENCOUNTER — VIRTUAL VISIT (OUTPATIENT)
Dept: FAMILY MEDICINE | Facility: OTHER | Age: 64
End: 2019-03-08

## 2019-03-08 NOTE — PROGRESS NOTES
"Date:   Clinician: Omega Hidalgo  Clinician NPI: [id number]  Patient: Carla Hong  Patient : 1955  Patient Address: [address] [place] [address]  Patient Phone: [phone]  Visit Protocol: URI  Patient Summary:  Carla is a 64 year old ( : 1955 ) female who initiated a Visit for cold, sinus infection, or influenza. When asked the question \"Please sign me up to receive news, health information and promotions from [employer].\", Carla responded \"No\".    Carla states her symptoms started gradually 10-13 days ago. After her symptoms started, they improved and then got worse again.   Her symptoms consist of malaise, facial pain or pressure, a cough, rhinitis, tooth pain, nasal congestion, myalgia, and a headache.   Symptom details     Nasal secretions: The color of her mucus is yellow and green.    Cough: Carla coughs a few times an hour and her cough is not more bothersome at night. Phlegm comes into her throat when she coughs. She believes the phlegm causes the cough. The color of the phlegm is yellow and green.     Facial pain or pressure: The facial pain or pressure feels worse when bending over or leaning forward.     Headache: She states the headache is moderate (4-6 on a 10 point pain scale).     Tooth pain: The tooth pain is not caused by a cavity, recent dental work, or other mouth problems.      Carla denies having fever, chills, wheezing, enlarged lymph nodes, sore throat, and ear pain. She also denies taking antibiotic medication for the symptoms and having recent facial or sinus surgery in the past 60 days. She is not experiencing dyspnea.   She has recently been exposed to someone with influenza. Carla has been in close contact with the following high risk individuals: children under the age of 5.   Carla had 1 sinus infection within the past year.   Weight: 150 lbs   Carla does not smoke or use smokeless tobacco.   Additional information as reported by the " patient (free text): I get these horrible sinus infections once a year and need antibiotics as it's been 2 weeks and it's worse this week. I tried to see my physician and neighboring clinics and no one had any appts. available today and it's a fRiday and can't wait until Monday. Please get me the 500 mg augmented or amoxicillan soon.   MEDICATIONS: No current medications, ALLERGIES: NKDA  Clinician Response:  Dear Carla,  Based on the information provided, you have acute bacterial sinusitis, also known as a sinus infection. Sinus infections are caused by bacteria or a virus and symptoms are almost always identical. The difference between the 2 types of infections is timing.  Sinus infections start as viral infections and symptoms improve on their own in about 7 days. If symptoms have not improved after 7 days or have even worsened, a bacterial infection may have developed.  Medication information  I am prescribing:       Fluticasone 50 mcg/actuation nasal spray. Inhale 2 sprays in each nostril 1 time per day; after 1 week, may adjust to 1 - 2 sprays in each nostril 1 time per day. This medication takes several days to start working, so keep taking it even if it doesn't help right away. There are no refills with this prescription.      Amoxicillin-pot clavulanate 875-125 mg oral tablet. Take 1 tablet by mouth every 12 hours for 10 days. There are no refills with this prescription.     Antibiotics can cause an allergic reaction even if you have taken them without a problem in the past. If you develop a new rash, swelling, or difficulty breathing, stop the medication and be seen in a clinic or urgent care immediately.  A yeast infection is a side effect of taking antibiotics in some women. Please use [facility] to get treatment if you have symptoms of a yeast infection.  Unless you are allergic to the over-the-counter medication(s) below, I recommend using:   A sinus irrigation kit such as Sinus Rinse, Neti Pot,  SinuCleanse, or store brand. Be sure to use sterile or previously boiled water to prevent unwanted infections.   Over-the-counter medications do not require a prescription. Ask the pharmacist if you have any questions.  Self care  The following tips will keep you as comfortable as possible while you recover:     Rest    Drink plenty of water and other liquids    Take a hot shower to loosen congestion    Take a spoonful of honey to reduce your cough     When to seek care  Please be seen in a clinic or urgent care if any of the following occur:     Symptoms do not start to improve after 3 days of treatment    New symptoms develop, or symptoms become worse      Diagnosis: Acute bacterial sinusitis  Diagnosis ICD: J01.90  Prescription: fluticasone 50 mcg/actuation nasal spray,suspension 1 120 spray aerosol with adapter (grams), 30 days supply. Inhale 2 sprays in each nostril 1 time per day; after 1 week, may adjust to 1 - 2 sprays in each nostril 1 time per day.. Refills: 0, Refill as needed: no, Allow substitutions: yes  Prescription: amoxicillin-pot clavulanate 875-125 mg oral tablet 20 tablet, 10 days supply. Take 1 tablet by mouth every 12 hours for 10 days. Refills: 0, Refill as needed: no, Allow substitutions: yes  Pharmacy: [facility] - [phone] - [address] [place] [address]

## 2019-03-14 NOTE — PROGRESS NOTES
SUBJECTIVE:   Carla Hong is a 64 year old female who presents to clinic today for the following health issues:      ENT Symptoms             Symptoms: cc Present Absent Comment   Fever/Chills   x    Fatigue  x     Muscle Aches  x  x6 weeks   Eye Irritation  x  Had pink eye 2 weeks ago   Sneezing  x     Nasal Maximiliano/Drg  x     Sinus Pressure/Pain  x     Loss of smell   x    Dental pain   x    Sore Throat  x  Some this week   Swollen Glands   x    Ear Pain/Fullness   x    Cough   x    Wheeze   x    Chest Pain   x    Shortness of breath   x    Rash  x  Blisters around foot that will not heal. Has seen orthopedic doctor twice and has been told to soak and it is not resolving. Would like this address.    Other         Symptom duration:  6 weeks   Symptom severity:  mod   Treatments tried:  Augmentin, cephalexin(1 month ago, has had leg lymphedema and was told to take this PRN, took this on her own and would like a refill her GYN oncologist is no longer with the [facility]), flonase    Contacts:  None     [facility]ans to [facility] and physical.     Usually has keflex on hand for cellulitis. After had surgery for removal of lymph nodes and had to be hospitalized 5 times for cellulitis. Thinks has had 1 in flare up in the last 5 years.     Has been feeling more tired and fatigued. Had bunion surgery in Dec. Since then has had blisters to right foot. Went to see podiatrist. Has been soaking foot in dreft twice per day, using Aspercreme patches. Right foot is tender. Did take prescription  Or keflex and now is on amox for sinus infection. Does have a nickel allergy and concerned that the titanium that was used has some nickel in it.     Taking Tylenol and advil for the pain to the bottom of right foot.     Has 2 days left of amox for sinus infection.     Problem list and histories reviewed & adjusted, as indicated.  Additional history: as documented    Current Outpatient Medications   Medication Sig Dispense Refill      amoxicillin-clavulanate (AUGMENTIN) 875-125 MG tablet   0     CALCIUM + D PO 1 daily       cephALEXin (KEFLEX) 500 MG capsule Take 1 capsule (500 mg) by mouth 4 times daily for 7 days 28 capsule 0     MELATONIN PO Take 10 mg by mouth       Multiple Vitamin (DAILY MULTIVITAMIN PO) Take 1 tablet by mouth daily.       predniSONE (DELTASONE) 20 MG tablet Take 20 mg by mouth daily. 5 tablet 0     fluticasone (FLONASE) 50 MCG/ACT nasal spray   0     Allergies   Allergen Reactions     Nickel Rash       Reviewed and updated as needed this visit by clinical staff  Tobacco  Allergies  Meds  Med Hx  Surg Hx  Fam Hx  Soc Hx      Reviewed and updated as needed this visit by Provider         ROS:  Review of Systems   Constitutional: Positive for fatigue. Negative for chills, diaphoresis and fever.   HENT: Positive for congestion, sinus pressure, sinus pain, sneezing and sore throat. Negative for ear discharge, ear pain, hearing loss and rhinorrhea.    Eyes: Positive for itching. Negative for discharge.   Respiratory: Negative for cough, shortness of breath and wheezing.    Gastrointestinal: Negative for constipation, diarrhea and nausea.   Musculoskeletal: Positive for myalgias.   Skin: Positive for rash (right foot).   Neurological: Negative for dizziness, light-headedness and headaches.         OBJECTIVE:     /74   Pulse 64   Temp 98.1  F (36.7  C) (Tympanic)   Resp 20   LMP 03/13/2007   There is no height or weight on file to calculate BMI.  Physical Exam   Constitutional: She appears well-developed.   HENT:   Right Ear: Tympanic membrane and external ear normal. Tympanic membrane is not erythematous. No middle ear effusion.   Left Ear: Tympanic membrane and external ear normal. Tympanic membrane is not erythematous.  No middle ear effusion.   Nose: No mucosal edema or rhinorrhea. Right sinus exhibits frontal sinus tenderness. Left sinus exhibits frontal sinus tenderness.   Cardiovascular: Normal rate,  regular rhythm and normal heart sounds.   Pulmonary/Chest: Effort normal and breath sounds normal.   Abdominal: Soft. Bowel sounds are normal.   Musculoskeletal:        Feet:    Neurological: She is alert.   Skin: Skin is warm and dry.   Psychiatric: She has a normal mood and affect.       ASSESSMENT/PLAN:   1. Other fatigue  We will check basic labs here today.  - Basic metabolic panel  - CBC with platelets differential  - TSH with free T4 reflex  - Vitamin D Deficiency    2. History of cancer of vulva  Encouraged to follow-up with gynecology.    3. Recurrent cellulitis of lower extremity  We will give refill of Keflex here today to have on hand.  - cephALEXin (KEFLEX) 500 MG capsule; Take 1 capsule (500 mg) by mouth 4 times daily for 7 days  Dispense: 28 capsule; Refill: 0    4. Acute non-recurrent frontal sinusitis  Take full course of oral antibiotic previously prescribed.    5. Skin irritation  Educated on use.  Notify if no improvement after steroid is completed and will refer to dermatology.  - predniSONE (DELTASONE) 20 MG tablet; Take 20 mg by mouth daily.  Dispense: 5 tablet; Refill: 0        TRUDY [facility]

## 2019-03-15 ENCOUNTER — OFFICE VISIT (OUTPATIENT)
Dept: FAMILY MEDICINE | Facility: CLINIC | Age: 64
End: 2019-03-15
Payer: COMMERCIAL

## 2019-03-15 VITALS
DIASTOLIC BLOOD PRESSURE: 74 MMHG | HEART RATE: 64 BPM | SYSTOLIC BLOOD PRESSURE: 120 MMHG | RESPIRATION RATE: 20 BRPM | TEMPERATURE: 98.1 F

## 2019-03-15 DIAGNOSIS — Z85.44 HISTORY OF CANCER OF VULVA: ICD-10-CM

## 2019-03-15 DIAGNOSIS — R23.8 SKIN IRRITATION: ICD-10-CM

## 2019-03-15 DIAGNOSIS — L03.119 RECURRENT CELLULITIS OF LOWER EXTREMITY: ICD-10-CM

## 2019-03-15 DIAGNOSIS — J01.10 ACUTE NON-RECURRENT FRONTAL SINUSITIS: ICD-10-CM

## 2019-03-15 DIAGNOSIS — R53.83 OTHER FATIGUE: Primary | ICD-10-CM

## 2019-03-15 LAB
ANION GAP SERPL CALCULATED.3IONS-SCNC: 6 MMOL/L (ref 3–14)
BASOPHILS # BLD AUTO: 0 10E9/L (ref 0–0.2)
BASOPHILS NFR BLD AUTO: 0.4 %
BUN SERPL-MCNC: 8 MG/DL (ref 7–30)
CALCIUM SERPL-MCNC: 9 MG/DL (ref 8.5–10.1)
CHLORIDE SERPL-SCNC: 102 MMOL/L (ref 94–109)
CO2 SERPL-SCNC: 26 MMOL/L (ref 20–32)
CREAT SERPL-MCNC: 0.62 MG/DL (ref 0.52–1.04)
DEPRECATED CALCIDIOL+CALCIFEROL SERPL-MC: 41 UG/L (ref 20–75)
DIFFERENTIAL METHOD BLD: NORMAL
EOSINOPHIL # BLD AUTO: 0.3 10E9/L (ref 0–0.7)
EOSINOPHIL NFR BLD AUTO: 4.9 %
ERYTHROCYTE [DISTWIDTH] IN BLOOD BY AUTOMATED COUNT: 13.8 % (ref 10–15)
GFR SERPL CREATININE-BSD FRML MDRD: >90 ML/MIN/{1.73_M2}
GLUCOSE SERPL-MCNC: 76 MG/DL (ref 70–99)
HCT VFR BLD AUTO: 38.8 % (ref 35–47)
HGB BLD-MCNC: 12.9 G/DL (ref 11.7–15.7)
LYMPHOCYTES # BLD AUTO: 1.4 10E9/L (ref 0.8–5.3)
LYMPHOCYTES NFR BLD AUTO: 27.1 %
MCH RBC QN AUTO: 27.4 PG (ref 26.5–33)
MCHC RBC AUTO-ENTMCNC: 33.2 G/DL (ref 31.5–36.5)
MCV RBC AUTO: 83 FL (ref 78–100)
MONOCYTES # BLD AUTO: 0.6 10E9/L (ref 0–1.3)
MONOCYTES NFR BLD AUTO: 10.8 %
NEUTROPHILS # BLD AUTO: 3 10E9/L (ref 1.6–8.3)
NEUTROPHILS NFR BLD AUTO: 56.8 %
PLATELET # BLD AUTO: 268 10E9/L (ref 150–450)
POTASSIUM SERPL-SCNC: 4 MMOL/L (ref 3.4–5.3)
RBC # BLD AUTO: 4.7 10E12/L (ref 3.8–5.2)
SODIUM SERPL-SCNC: 134 MMOL/L (ref 133–144)
TSH SERPL DL<=0.005 MIU/L-ACNC: 0.82 MU/L (ref 0.4–4)
WBC # BLD AUTO: 5.3 10E9/L (ref 4–11)

## 2019-03-15 PROCEDURE — 85025 COMPLETE CBC W/AUTO DIFF WBC: CPT | Performed by: NURSE PRACTITIONER

## 2019-03-15 PROCEDURE — 80048 BASIC METABOLIC PNL TOTAL CA: CPT | Performed by: NURSE PRACTITIONER

## 2019-03-15 PROCEDURE — 84443 ASSAY THYROID STIM HORMONE: CPT | Performed by: NURSE PRACTITIONER

## 2019-03-15 PROCEDURE — 99214 OFFICE O/P EST MOD 30 MIN: CPT | Performed by: NURSE PRACTITIONER

## 2019-03-15 PROCEDURE — 82306 VITAMIN D 25 HYDROXY: CPT | Performed by: NURSE PRACTITIONER

## 2019-03-15 PROCEDURE — 36415 COLL VENOUS BLD VENIPUNCTURE: CPT | Performed by: NURSE PRACTITIONER

## 2019-03-15 RX ORDER — CEPHALEXIN 500 MG/1
500 CAPSULE ORAL 4 TIMES DAILY
Qty: 28 CAPSULE | Refills: 0 | Status: SHIPPED | OUTPATIENT
Start: 2019-03-15 | End: 2019-04-02

## 2019-03-15 RX ORDER — FLUTICASONE PROPIONATE 50 MCG
SPRAY, SUSPENSION (ML) NASAL
Refills: 0 | COMMUNITY
Start: 2019-03-08 | End: 2019-04-02

## 2019-03-15 RX ORDER — PREDNISONE 20 MG/1
20 TABLET ORAL DAILY
Qty: 5 TABLET | Refills: 0 | Status: SHIPPED | OUTPATIENT
Start: 2019-03-15 | End: 2019-04-02

## 2019-03-15 ASSESSMENT — ENCOUNTER SYMPTOMS
MYALGIAS: 1
DIZZINESS: 0
SHORTNESS OF BREATH: 0
SINUS PRESSURE: 1
FEVER: 0
COUGH: 0
RHINORRHEA: 0
SORE THROAT: 1
HEADACHES: 0
NAUSEA: 0
LIGHT-HEADEDNESS: 0
CONSTIPATION: 0
SINUS PAIN: 1
FATIGUE: 1
DIAPHORESIS: 0
EYE DISCHARGE: 0
CHILLS: 0
DIARRHEA: 0
WHEEZING: 0
EYE ITCHING: 1

## 2019-04-02 ENCOUNTER — OFFICE VISIT (OUTPATIENT)
Dept: OBGYN | Facility: CLINIC | Age: 64
End: 2019-04-02
Attending: NURSE PRACTITIONER
Payer: COMMERCIAL

## 2019-04-02 VITALS
BODY MASS INDEX: 22.73 KG/M2 | HEIGHT: 68 IN | SYSTOLIC BLOOD PRESSURE: 126 MMHG | DIASTOLIC BLOOD PRESSURE: 74 MMHG | WEIGHT: 150 LBS | HEART RATE: 94 BPM

## 2019-04-02 DIAGNOSIS — L90.0 LICHEN SCLEROSUS: ICD-10-CM

## 2019-04-02 DIAGNOSIS — Z85.44 HISTORY OF CANCER OF VULVA: ICD-10-CM

## 2019-04-02 DIAGNOSIS — N95.2 VAGINAL ATROPHY: ICD-10-CM

## 2019-04-02 DIAGNOSIS — Z23 NEED FOR SHINGLES VACCINE: ICD-10-CM

## 2019-04-02 DIAGNOSIS — Z01.411 ENCOUNTER FOR GYNECOLOGICAL EXAMINATION WITH ABNORMAL FINDING: Primary | ICD-10-CM

## 2019-04-02 DIAGNOSIS — Z12.4 SCREENING FOR MALIGNANT NEOPLASM OF CERVIX: ICD-10-CM

## 2019-04-02 PROCEDURE — G0463 HOSPITAL OUTPT CLINIC VISIT: HCPCS | Mod: ZF

## 2019-04-02 PROCEDURE — G0145 SCR C/V CYTO,THINLAYER,RESCR: HCPCS | Performed by: NURSE PRACTITIONER

## 2019-04-02 PROCEDURE — 87624 HPV HI-RISK TYP POOLED RSLT: CPT | Performed by: NURSE PRACTITIONER

## 2019-04-02 RX ORDER — CLOBETASOL PROPIONATE 0.5 MG/G
OINTMENT TOPICAL AT BEDTIME
Qty: 30 G | Refills: 1 | Status: SHIPPED | OUTPATIENT
Start: 2019-04-02 | End: 2020-09-28

## 2019-04-02 ASSESSMENT — PATIENT HEALTH QUESTIONNAIRE - PHQ9
SUM OF ALL RESPONSES TO PHQ QUESTIONS 1-9: 1
5. POOR APPETITE OR OVEREATING: SEVERAL DAYS

## 2019-04-02 ASSESSMENT — ANXIETY QUESTIONNAIRES
1. FEELING NERVOUS, ANXIOUS, OR ON EDGE: NOT AT ALL
3. WORRYING TOO MUCH ABOUT DIFFERENT THINGS: NOT AT ALL
2. NOT BEING ABLE TO STOP OR CONTROL WORRYING: NOT AT ALL
6. BECOMING EASILY ANNOYED OR IRRITABLE: NOT AT ALL
GAD7 TOTAL SCORE: 1
5. BEING SO RESTLESS THAT IT IS HARD TO SIT STILL: NOT AT ALL
7. FEELING AFRAID AS IF SOMETHING AWFUL MIGHT HAPPEN: NOT AT ALL

## 2019-04-02 ASSESSMENT — PAIN SCALES - GENERAL: PAINLEVEL: NO PAIN (0)

## 2019-04-02 ASSESSMENT — MIFFLIN-ST. JEOR: SCORE: 1282.52

## 2019-04-02 NOTE — LETTER
2019       RE: Carla Hong  [address] [address]     Dear Colleague,    Thank you for referring your patient, Carla Hong, to the [facility] at [facility]. Please see a copy of my visit note below.    Progress Note    SUBJECTIVE:  Carla Hong is an 64 year old  , who requests a pelvic exam.  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  Patient is followed by Dr. Pablo for primary care.    Concerns today include:    1. Due for pap smear, see hx of gynecology care below (per problem list):    Diagnosed with Vulvar squamous cell carcinoma  Gynecology oncology referral.  2012 Oncology consult with Henrry Granger MD - [facility]  2012 Radical vulvectomy: Invasive well-differentiated squamous cell carcinoma. All surgical margins are free of invasive carcinoma.  12: Post-op scheduled with Henrry Granger MD. Reminder placed in EPIC to track and follow patient and MD plan  Routine surveillance:3 month surveillance visits for total of 2 years and every 6 months for an additional 3 years.   13: 3 mo f/u appointment scheduled and canceled.  14: Surveillance visit. Next due in 3 months. In reminders  14: Surveillance visit. Next due in 3 months. In reminders  3/6/15:Pap--NIL  16:Pap--NIL, neg HPV. One more 6 month f/u appt w/exam to complete 5 year surveillance (2017) -in Tracking  02/15/18 Patient is lost to follow-up.     2. Dyspareunia: Carla is sexually active with her , in a monogamous relationship.  She has been using a OTC moisturizer once in the morning before intercourse, and lubricant during intercourse. Notes tears in the skin that bleed after intercourse. Through review of patient's chart it was noted that a vulvar biopsy was done 1/15/2015 that showed lichen sclerosis and lichen simplex chronicus at the 12 o'clock position.  She was not treated for this  in any way.      3. Due for 1st Shingrix vaccine: Has received the Zostavax vaccine in the past.     Carla is post-menopausal; LMP was in . Denies uterine/ vaginal bleeding.     Breast exam was done in 2019 at [facility]  Mammogram: 3/7/2019 BI-RADS Category 1 - negative    Social hx: works as an  at one of the [employer]    HISTORY:  Prescription Medications as of 2019       Rx Number Disp Refills Start End Last Dispensed Date Next Fill Date Owning Pharmacy    CALCIUM + D PO            Si daily    Class: Historical    Route: Oral    clobetasol (TEMOVATE) 0.05 % external ointment  30 g 1 2019    [address]    Sig: Apply topically At Bedtime    Class: E-Prescribe    Route: Topical    conjugated estrogens (PREMARIN) 0.625 MG/GM vaginal cream  30 g 1 2019    [address]    Sig: Place 0.5 g vaginally twice a week    Class: E-Prescribe    Route: Vaginal    MELATONIN PO            Sig: Take 10 mg by mouth    Class: Historical    Route: Oral    Multiple Vitamin (DAILY MULTIVITAMIN PO)            Sig: Take 1 tablet by mouth daily.    Class: Historical    Route: Oral      Clinic-Administered Medications as of 2019       Dose Frequency Start End    sodium chloride (PF) 0.9% PF flush 70 mL 70 mL ONCE 2014     Sig: Inject 70 mLs into the vein once    Route: Intravenous        Allergies   Allergen Reactions     Nickel Rash     Immunization History   Administered Date(s) Administered     HEPA 2015     HepA-Adult 2016     Influenza Vaccine IM 3yrs+ 4 Valent IIV4 10/23/2014, 2016     Meningococcal (Menactra ) 2016     Pneumococcal 23 valent 2013     TD (ADULT, 7+) 2001     TDAP Vaccine (Adacel) 2011     Typhoid IM 2016     Yellow Fever 2016     Zoster vaccine recombinant adjuvanted (SHINGRIX) 2019     Zoster  vaccine, live 2016     Obstetric History       T4      L2     SAB3   TAB0   Ectopic0   Multiple0   Live Births0    Obstetric Comments    x 2     Past Medical History:   Diagnosis Date     Vulvar cancer (H)      Past Surgical History:   Procedure Laterality Date     BUNIONECTOMY Right 2018    Procedure: 1.  Lapidus bunionectomy right foot  2. hammertoe correction third toe right foot;  Surgeon: Saqib Interiano DPM;  Location: [facility]     COLONOSCOPY  2010    COLONOSCOPY performed by RYAN OSEGUERA at [facility]     VULVECTOMY RADICAL DISSECT GROIN(S)  2012    Procedure: VULVECTOMY RADICAL DISSECT GROIN(S);  Radical Vulvectomy Bilateral Inginal Femoral Lymph Node Dissection.;  Surgeon: Henrry Granger MD;  Location:  OR     Family History   Problem Relation Age of Onset     Diabetes Maternal Grandmother      Cancer Mother         renal cell carcinoma     Cancer Son         brain cancer/germinoma     Hypertension No family hx of      Cerebrovascular Disease No family hx of      Breast Cancer No family hx of      Cancer - colorectal No family hx of      Prostate Cancer No family hx of      C.A.D. No family hx of      Social History     Socioeconomic History     Marital status:      Spouse name: None     Number of children: None     Years of education: None     Highest education level: None   Occupational History     None   Social Needs     Financial resource strain: None     Food insecurity:     Worry: None     Inability: None     Transportation needs:     Medical: None     Non-medical: None   Tobacco Use     Smoking status: Never Smoker     Smokeless tobacco: Never Used   Substance and Sexual Activity     Alcohol use: Yes     Comment: 1-2xweek     Drug use: No     Sexual activity: Yes     Partners: Male     Birth control/protection: Surgical     Comment: Vasectomy   Lifestyle     Physical activity:     Days per week: None     Minutes per session: None      "Stress: None   Relationships     Social connections:     Talks on phone: None     Gets together: None     Attends Confucianist service: None     Active member of club or organization: None     Attends meetings of clubs or organizations: None     Relationship status: None     Intimate partner violence:     Fear of current or ex partner: None     Emotionally abused: None     Physically abused: None     Forced sexual activity: None   Other Topics Concern     Parent/sibling w/ CABG, MI or angioplasty before 65F 55M? No     ROS    EXAM:  Blood pressure 126/74, pulse 94, height 1.725 m (5' 7.91\"), weight 68 kg (150 lb), last menstrual period 03/13/2007, not currently breastfeeding. Body mass index is 22.87 kg/m .  General appearance: Pleasant female in no acute distress.     PELVIC EXAM:   EG/BUS: ~5mm wide pearly white papule at 12 o'clock position, just below scar from vulvectomy and removal of clitoris; small, bleeding break in skin at 7 o'clock position one finger breath inside the introitus; break in skin at 6 o'clock position with scant bleeding evident at perineum. Estrogen loss atrophy present. Bartholin's and Urethra normal.   Urethral meatus: normal  Urethra: no masses, tenderness, or scarring   Bladder: no masses or tenderness   Vagina: atrophic, thin, dry with physiologic discharge secretions  Cervix: pale, dry, without lesion or CMT  Uterus: anteverted,  and small, smooth, firm, mobile w/o pain  Adnexa: Not palpable  Rectum: anus normal     ASSESSMENT:  Encounter Diagnoses   Name Primary?     Vaginal atrophy      Lichen sclerosus      Screening for malignant neoplasm of cervix      Encounter for gynecological examination with abnormal finding Yes     History of cancer of vulva      Need for shingles vaccine      PLAN:   Orders Placed This Encounter   Procedures     Pelvic and Breast Exam Procedure []     Pap Smear Exam []     Pap imaged thin layer screen with HPV - recommended age 30 - 65 years (select " HPV order below)     HPV High Risk Types DNA Cervical     Pap smear with HPV testing completed today    Counseled Carla on options for management of estrogen loss atrophy and thinning vaginal tissue. She desires to start vaginal estrogen today.  Provided verbal and written instructions for use, to use nightly x 2 weeks, then decrease use to twice per week thereafter. Encouraged her to continue using an unscented, plain lubricant for intercourse.    The white papule noted on external genitalia (12 o'clock position) is most consistent in appearance with lichen sclerosus; this is in the same location that the biopsy was noted to have been done in 2015; the pathology at that time was lichen sclerosus and lichen simplex chronicus. She has never treated this area with anything.  Recommended application of clobetasol ointment nightly x 6 weeks and then to return to clinic for follow-up appointment with ObGyn for further evaluation.  Counseled Carla on the low threshold for biopsying this area if no improvement given history of vulvar carcinoma and that lichen sclerosus increases one's risk of squamous cell cancer. Patient was shown where to apply the ointment with mirror.     Breast exam (done at [facility] per patient report) and mammogram up to date.    Recommended Shingrix shingles vaccine given that she has only had the Zostavax. First Shingrix vaccine to be given today at the pharmacy. Return to clinic for 2nd vaccine in 2-6 months.     Return in one year/PRN for preventive care or problems/concerns.     Verbalized understanding and agreement with visit plan.    Yadira Lopes, DNP, APRN, WHNP

## 2019-04-02 NOTE — PATIENT INSTRUCTIONS
Pap smear with HPV testing was completed today    Start using vaginal estrogen cream. Use daily for 2 weeks, then decrease to twice per week thereafter.  Continue using lubricant for intercourse.    Apply clobetasol ointment on area of thickened, whitening, near bottom in scar at night for 6 weeks. Then follow-up with ObGyn to confirm improvement.

## 2019-04-02 NOTE — PROGRESS NOTES
Progress Note    SUBJECTIVE:  Carla Hong is an 64 year old  , who requests a pelvic exam.  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  Patient is followed by Dr. Pablo for primary care.    Concerns today include:    1. Due for pap smear, see hx of gynecology care below (per problem list):    Diagnosed with Vulvar squamous cell carcinoma  Gynecology oncology referral.  2012 Oncology consult with Henrry Granger MD - [facility]  2012 Radical vulvectomy: Invasive well-differentiated squamous cell carcinoma. All surgical margins are free of invasive carcinoma.  12: Post-op scheduled with Henrry Granger MD. Reminder placed in EPIC to track and follow patient and MD plan  Routine surveillance:3 month surveillance visits for total of 2 years and every 6 months for an additional 3 years.   13: 3 mo f/u appointment scheduled and canceled.  14: Surveillance visit. Next due in 3 months. In reminders  14: Surveillance visit. Next due in 3 months. In reminders  3/6/15:Pap--NIL  16:Pap--NIL, neg HPV. One more 6 month f/u appt w/exam to complete 5 year surveillance (2017) -in Tracking  02/15/18 Patient is lost to follow-up.     2. Dyspareunia: Carla is sexually active with her , in a monogamous relationship.  She has been using a OTC moisturizer once in the morning before intercourse, and lubricant during intercourse. Notes tears in the skin that bleed after intercourse. Through review of patient's chart it was noted that a vulvar biopsy was done 1/15/2015 that showed lichen sclerosis and lichen simplex chronicus at the 12 o'clock position.  She was not treated for this in any way.      3. Due for 1st Shingrix vaccine: Has received the Zostavax vaccine in the past.     Carla is post-menopausal; LMP was in . Denies uterine/ vaginal bleeding.     Breast exam was done in 2019 at [facility]  Mammogram: 3/7/2019 BI-RADS Category 1 - negative    Social hx:  works as an  at one of the [employer]    HISTORY:  Prescription Medications as of 2019       Rx Number Disp Refills Start End Last Dispensed Date Next Fill Date Owning Pharmacy    CALCIUM + D PO            Si daily    Class: Historical    Route: Oral    clobetasol (TEMOVATE) 0.05 % external ointment  30 g 1 2019    [address]    Sig: Apply topically At Bedtime    Class: E-Prescribe    Route: Topical    conjugated estrogens (PREMARIN) 0.625 MG/GM vaginal cream  30 g 1 2019    [address]    Sig: Place 0.5 g vaginally twice a week    Class: E-Prescribe    Route: Vaginal    MELATONIN PO            Sig: Take 10 mg by mouth    Class: Historical    Route: Oral    Multiple Vitamin (DAILY MULTIVITAMIN PO)            Sig: Take 1 tablet by mouth daily.    Class: Historical    Route: Oral      Clinic-Administered Medications as of 2019       Dose Frequency Start End    sodium chloride (PF) 0.9% PF flush 70 mL 70 mL ONCE 2014     Sig: Inject 70 mLs into the vein once    Route: Intravenous        Allergies   Allergen Reactions     Nickel Rash     Immunization History   Administered Date(s) Administered     HEPA 2015     HepA-Adult 2016     Influenza Vaccine IM 3yrs+ 4 Valent IIV4 10/23/2014, 2016     Meningococcal (Menactra ) 2016     Pneumococcal 23 valent 2013     TD (ADULT, 7+) 2001     TDAP Vaccine (Adacel) 2011     Typhoid IM 2016     Yellow Fever 2016     Zoster vaccine recombinant adjuvanted (SHINGRIX) 2019     Zoster vaccine, live 2016     Obstetric History       T4      L2     SAB3   TAB0   Ectopic0   Multiple0   Live Births0    Obstetric Comments    x 2     Past Medical History:   Diagnosis Date     Vulvar cancer (H)      Past Surgical History:   Procedure Laterality Date      BUNIONECTOMY Right 12/18/2018    Procedure: 1.  Lapidus bunionectomy right foot  2. hammertoe correction third toe right foot;  Surgeon: Saqib Interiano DPM;  Location: [facility]     COLONOSCOPY  12/6/2010    COLONOSCOPY performed by RYAN OSEGUERA at [facility]     VULVECTOMY RADICAL DISSECT GROIN(S)  11/16/2012    Procedure: VULVECTOMY RADICAL DISSECT GROIN(S);  Radical Vulvectomy Bilateral Inginal Femoral Lymph Node Dissection.;  Surgeon: Henrry Granger MD;  Location:  OR     Family History   Problem Relation Age of Onset     Diabetes Maternal Grandmother      Cancer Mother         renal cell carcinoma     Cancer Son         brain cancer/germinoma     Hypertension No family hx of      Cerebrovascular Disease No family hx of      Breast Cancer No family hx of      Cancer - colorectal No family hx of      Prostate Cancer No family hx of      C.A.D. No family hx of      Social History     Socioeconomic History     Marital status:      Spouse name: None     Number of children: None     Years of education: None     Highest education level: None   Occupational History     None   Social Needs     Financial resource strain: None     Food insecurity:     Worry: None     Inability: None     Transportation needs:     Medical: None     Non-medical: None   Tobacco Use     Smoking status: Never Smoker     Smokeless tobacco: Never Used   Substance and Sexual Activity     Alcohol use: Yes     Comment: 1-2xweek     Drug use: No     Sexual activity: Yes     Partners: Male     Birth control/protection: Surgical     Comment: Vasectomy   Lifestyle     Physical activity:     Days per week: None     Minutes per session: None     Stress: None   Relationships     Social connections:     Talks on phone: None     Gets together: None     Attends [employer] service: None     Active member of club or organization: None     Attends meetings of clubs or organizations: None     Relationship status: None     Intimate partner violence:  "    Fear of current or ex partner: None     Emotionally abused: None     Physically abused: None     Forced sexual activity: None   Other Topics Concern     Parent/sibling w/ CABG, MI or angioplasty before 65F 55M? No     ROS    EXAM:  Blood pressure 126/74, pulse 94, height 1.725 m (5' 7.91\"), weight 68 kg (150 lb), last menstrual period 03/13/2007, not currently breastfeeding. Body mass index is 22.87 kg/m .  General appearance: Pleasant female in no acute distress.     PELVIC EXAM:   EG/BUS: ~5mm wide pearly white papule at 12 o'clock position, just below scar from vulvectomy and removal of clitoris; small, bleeding break in skin at 7 o'clock position one finger breath inside the introitus; break in skin at 6 o'clock position with scant bleeding evident at perineum. Estrogen loss atrophy present. Bartholin's and Urethra normal.   Urethral meatus: normal  Urethra: no masses, tenderness, or scarring   Bladder: no masses or tenderness   Vagina: atrophic, thin, dry with physiologic discharge secretions  Cervix: pale, dry, without lesion or CMT  Uterus: anteverted,  and small, smooth, firm, mobile w/o pain  Adnexa: Not palpable  Rectum: anus normal     ASSESSMENT:  Encounter Diagnoses   Name Primary?     Vaginal atrophy      Lichen sclerosus      Screening for malignant neoplasm of cervix      Encounter for gynecological examination with abnormal finding Yes     History of cancer of vulva      Need for shingles vaccine      PLAN:   Orders Placed This Encounter   Procedures     Pelvic and Breast Exam Procedure []     Pap Smear Exam []     Pap imaged thin layer screen with HPV - recommended age 30 - 65 years (select HPV order below)     HPV High Risk Types DNA Cervical     Pap smear with HPV testing completed today    Counseled Carla on options for management of estrogen loss atrophy and thinning vaginal tissue. She desires to start vaginal estrogen today.  Provided verbal and written instructions for use, to " use nightly x 2 weeks, then decrease use to twice per week thereafter. Encouraged her to continue using an unscented, plain lubricant for intercourse.    The white papule noted on external genitalia (12 o'clock position) is most consistent in appearance with lichen sclerosus; this is in the same location that the biopsy was noted to have been done in 2015; the pathology at that time was lichen sclerosus and lichen simplex chronicus. She has never treated this area with anything.  Recommended application of clobetasol ointment nightly x 6 weeks and then to return to clinic for follow-up appointment with ObGyn for further evaluation.  Counseled Carla on the low threshold for biopsying this area if no improvement given history of vulvar carcinoma and that lichen sclerosus increases one's risk of squamous cell cancer. Patient was shown where to apply the ointment with mirror.     Breast exam (done at [facility] per patient report) and mammogram up to date.    Recommended Shingrix shingles vaccine given that she has only had the Zostavax. First Shingrix vaccine to be given today at the pharmacy. Return to clinic for 2nd vaccine in 2-6 months.     Return in one year/PRN for preventive care or problems/concerns.     Verbalized understanding and agreement with visit plan.    Yadira Lopes, DNP, APRN, WHNP

## 2019-04-03 ASSESSMENT — ANXIETY QUESTIONNAIRES: GAD7 TOTAL SCORE: 1

## 2019-04-05 LAB
COPATH REPORT: NORMAL
PAP: NORMAL

## 2019-04-08 LAB
FINAL DIAGNOSIS: NORMAL
HPV HR 12 DNA CVX QL NAA+PROBE: NEGATIVE
HPV16 DNA SPEC QL NAA+PROBE: NEGATIVE
HPV18 DNA SPEC QL NAA+PROBE: NEGATIVE
SPECIMEN DESCRIPTION: NORMAL
SPECIMEN SOURCE CVX/VAG CYTO: NORMAL

## 2019-04-23 ENCOUNTER — TELEPHONE (OUTPATIENT)
Dept: PODIATRY | Facility: CLINIC | Age: 64
End: 2019-04-23

## 2019-04-23 NOTE — TELEPHONE ENCOUNTER
Reason for Call:  Other     Detailed comments: Pt is 4 months s/p surgery, still having a hard time with recovery. Can still feel the hardware in her foot - area is tender around it when she walks. Wants to know if there is a possibility to have hardware removed (states it is the bottom screw that is bothersome) - Please advise    Phone Number Patient can be reached at: Home number on file [phone] (home)    Best Time: Any    Can we leave a detailed message on this number? YES    Call taken on 4/23/2019 at 9:03 AM by Denise Behrendt

## 2019-04-24 NOTE — TELEPHONE ENCOUNTER
Patient  is 4 months s/p surgery, still having a hard time with recovery. Can still feel the hardware in her foot - area is tender around it when she walks. Wants to know if there is a possibility to have hardware removed (states it is the bottom screw that is bothersome) - Called patient and advised her that if she wants the hardware to be removed she would need to come in for an evaluation of the foot to discuss surgery. I informed her that he may need to perform another X-ray and then from their he can discuss with the patient surgery. I informed her that the surgical area needed to be healed first. I informed patient that it may be up to one year from the original surgery to get the hardware removed but it all depends on how everything is healing. She informed me that she would set something up after her summer plans in [place]. I informed her that Dr. Interiano suggested a Dynaflex insert to help for now. Patient wanted to know if she could buy them online and I advised her that she could look and if she couldn't find anything to contact us back and we can fit her for one. She was very grateful.

## 2019-06-27 ENCOUNTER — OFFICE VISIT (OUTPATIENT)
Dept: OBGYN | Facility: CLINIC | Age: 64
End: 2019-06-27
Attending: OBSTETRICS & GYNECOLOGY
Payer: COMMERCIAL

## 2019-06-27 VITALS
SYSTOLIC BLOOD PRESSURE: 111 MMHG | BODY MASS INDEX: 22.87 KG/M2 | WEIGHT: 150 LBS | DIASTOLIC BLOOD PRESSURE: 72 MMHG | HEART RATE: 70 BPM

## 2019-06-27 DIAGNOSIS — N62 SYMPTOMATIC MAMMARY HYPERTROPHY: ICD-10-CM

## 2019-06-27 DIAGNOSIS — N95.2 VAGINAL ATROPHY: Primary | ICD-10-CM

## 2019-06-27 DIAGNOSIS — L90.0 LICHEN SCLEROSUS: ICD-10-CM

## 2019-06-27 PROCEDURE — G0463 HOSPITAL OUTPT CLINIC VISIT: HCPCS | Mod: ZF

## 2019-06-27 NOTE — PROGRESS NOTES
Carla is a 64 year old female  w/ history of vulvar carcinoma that presents today for f/u on vaginal dryness, and lichen sclerosus.    HPI: Was seen in  for evaluation of vulvar symptoms and given rx premarin and clobetasol for vaginal atrophy and h/o LS. Did not fill premarin due to cost, so is using lubricants for intercourse. Still painful during intercourse and seeing some vaginal tears afterwards. No vaginal spotting except after sex. No other pelvic pain.    Also has history of vulvar cancer and LS. Has been using clobetasol nightly with good relief. No itching, burning or lesions she has noted.     Is also interested for referral for possible breast reduction due to back pain.     No headache, nausea, fever, chills, constipation or diarrhea. No problems with bowel movements or urination.     ROS: 10 point ROS neg other than the symptoms noted above in the HPI.      PROBLEM LIST:  Patient Active Problem List   Diagnosis     Chronic rhinitis     Dysphonia     Other diseases of vocal cords     Adjustment disorder with depressed mood     Vulvar lump     Postmenopausal bleeding     Vulvar cancer (H)     Cellulitis of groin, left     Recurrent cellulitis of lower extremity     Ptosis of breast     S/P bunionectomy     Pain of toe of right foot     History of cancer of vulva       OB/GYN HISTORY:   OB History    Para Term  AB Living   7 4 4 0 3 2   SAB TAB Ectopic Multiple Live Births   3 0 0 0 0      # Outcome Date GA Lbr Quinn/2nd Weight Sex Delivery Anes PTL Lv   7 Term            6 Term            5 SAB            4 SAB            3 SAB            2 Term            1 Term               Obstetric Comments    x 2       PAST MEDICAL HISTORY:  Past Medical History:   Diagnosis Date     Vulvar cancer (H)          PAST SURGICAL HISTORY:  Past Surgical History:   Procedure Laterality Date     BUNIONECTOMY Right 2018    Procedure: 1.  Lapidus bunionectomy right foot  2. hammertoe  correction third toe right foot;  Surgeon: Saqib Interiano DPM;  Location: [facility]     COLONOSCOPY  12/6/2010    COLONOSCOPY performed by RYAN OSEGUERA at [facility]     VULVECTOMY RADICAL DISSECT GROIN(S)  11/16/2012    Procedure: VULVECTOMY RADICAL DISSECT GROIN(S);  Radical Vulvectomy Bilateral Inginal Femoral Lymph Node Dissection.;  Surgeon: Henrry Granger MD;  Location:  OR       FAMILY HISTORY:  Family History   Problem Relation Age of Onset     Diabetes Maternal Grandmother      Cancer Mother         renal cell carcinoma     Cancer Son         brain cancer/germinoma     Hypertension No family hx of      Cerebrovascular Disease No family hx of      Breast Cancer No family hx of      Cancer - colorectal No family hx of      Prostate Cancer No family hx of      C.A.D. No family hx of        SOCIAL HISTORY:  Social History     Socioeconomic History     Marital status:      Spouse name: Not on file     Number of children: Not on file     Years of education: Not on file     Highest education level: Not on file   Occupational History     Not on file   Social Needs     Financial resource strain: Not on file     Food insecurity:     Worry: Not on file     Inability: Not on file     Transportation needs:     Medical: Not on file     Non-medical: Not on file   Tobacco Use     Smoking status: Never Smoker     Smokeless tobacco: Never Used   Substance and Sexual Activity     Alcohol use: Yes     Comment: 1-2xweek     Drug use: No     Sexual activity: Yes     Partners: Male     Birth control/protection: Surgical     Comment: Vasectomy       MEDICATIONS:  Current Outpatient Medications   Medication Sig Dispense Refill     CALCIUM + D PO 1 daily       clobetasol (TEMOVATE) 0.05 % external ointment Apply topically At Bedtime 30 g 1     MELATONIN PO Take 10 mg by mouth       Multiple Vitamin (DAILY MULTIVITAMIN PO) Take 1 tablet by mouth daily.       conjugated estrogens (PREMARIN) 0.625 MG/GM vaginal cream  Place 0.5 g vaginally twice a week (Patient not taking: Reported on 6/27/2019) 30 g 1       ALLERGIES:  Nickel    VITALS:  Blood pressure 111/72, pulse 70, weight 68 kg (150 lb), last menstrual period 03/13/2007, not currently breastfeeding.    PHYSICAL EXAM:  General : No distress  Pelvic Exam:  Vulva: No external lesions, fusion of labia bilaterally with hypopigmentation of vulva consistent with LS. No lesions noted or areas of concern to biopsy.   Vagina: Atrophic appearing, normal vaginal diameter.     A/P: 64 year old here for follow up for LS and vulvovaginal atrophy  Rx compounded estradiol cream  Continue clobetasol, can decrease to 2-3x/week if symptoms are stable, recommend at least yearly vulvar skin exams.   Follow up in 1 year    Catrachita Balderrama MD

## 2019-06-27 NOTE — LETTER
2019       RE: Carla Hong  [address] [id number]     Dear Colleague,    Thank you for referring your patient, Carla Hong, to the [facility] at [facility]. Please see a copy of my visit note below.    Carla is a 64 year old female  w/ history of vulvar carcinoma that presents today for f/u on vaginal dryness, and lichen sclerosus.    HPI: Was seen in  for evaluation of vulvar symptoms and given rx premarin and clobetasol for vaginal atrophy and h/o LS. Did not fill premarin due to cost, so is using lubricants for intercourse. Still painful during intercourse and seeing some vaginal tears afterwards. No vaginal spotting except after sex. No other pelvic pain.    Also has history of vulvar cancer and LS. Has been using clobetasol nightly with good relief. No itching, burning or lesions she has noted.     Is also interested for referral for possible breast reduction due to back pain.     No headache, nausea, fever, chills, constipation or diarrhea. No problems with bowel movements or urination.     ROS: 10 point ROS neg other than the symptoms noted above in the HPI.      PROBLEM LIST:  Patient Active Problem List   Diagnosis     Chronic rhinitis     Dysphonia     Other diseases of vocal cords     Adjustment disorder with depressed mood     Vulvar lump     Postmenopausal bleeding     Vulvar cancer (H)     Cellulitis of groin, left     Recurrent cellulitis of lower extremity     Ptosis of breast     S/P bunionectomy     Pain of toe of right foot     History of cancer of vulva       OB/GYN HISTORY:   OB History    Para Term  AB Living   7 4 4 0 3 2   SAB TAB Ectopic Multiple Live Births   3 0 0 0 0      # Outcome Date GA Lbr Quinn/2nd Weight Sex Delivery Anes PTL Lv   7 Term            6 Term            5 SAB            4 SAB            3 SAB            2 Term            1 Term               Obstetric  Comments    x 2       PAST MEDICAL HISTORY:  Past Medical History:   Diagnosis Date     Vulvar cancer (H)          PAST SURGICAL HISTORY:  Past Surgical History:   Procedure Laterality Date     BUNIONECTOMY Right 2018    Procedure: 1.  Lapidus bunionectomy right foot  2. hammertoe correction third toe right foot;  Surgeon: Saqib Interiano DPM;  Location: [facility]     COLONOSCOPY  2010    COLONOSCOPY performed by R[facility]AN OSEGUERA at [facility]     VULVECTOMY RADICAL DISSECT GROIN(S)  2012    Procedure: VULVECTOMY RADICAL DISSECT GROIN(S);  Radical Vulvectomy Bilateral Inginal Femoral Lymph Node Dissection.;  Surgeon: Henrry Granger MD;  Location:  OR       FAMILY HISTORY:  Family History   Problem Relation Age of Onset     Diabetes Maternal Grandmother      Cancer Mother         renal cell carcinoma     Cancer Son         brain cancer/germinoma     Hypertension No family hx of      Cerebrovascular Disease No family hx of      Breast Cancer No family hx of      Cancer - colorectal No family hx of      Prostate Cancer No family hx of      C.A.D. No family hx of        SOCIAL HISTORY:  Social History     Socioeconomic History     Marital status:      Spouse name: Not on file     Number of children: Not on file     Years of education: Not on file     Highest education level: Not on file   Occupational History     Not on file   Social Needs     Financial resource strain: Not on file     Food insecurity:     Worry: Not on file     Inability: Not on file     Transportation needs:     Medical: Not on file     Non-medical: Not on file   Tobacco Use     Smoking status: Never Smoker     Smokeless tobacco: Never Used   Substance and Sexual Activity     Alcohol use: Yes     Comment: 1-2xweek     Drug use: No     Sexual activity: Yes     Partners: Male     Birth control/protection: Surgical     Comment: Vasectomy       MEDICATIONS:  Current Outpatient Medications   Medication Sig Dispense  Refill     CALCIUM + D PO 1 daily       clobetasol (TEMOVATE) 0.05 % external ointment Apply topically At Bedtime 30 g 1     MELATONIN PO Take 10 mg by mouth       Multiple Vitamin (DAILY MULTIVITAMIN PO) Take 1 tablet by mouth daily.       conjugated estrogens (PREMARIN) 0.625 MG/GM vaginal cream Place 0.5 g vaginally twice a week (Patient not taking: Reported on 6/27/2019) 30 g 1       ALLERGIES:  Nickel    VITALS:  Blood pressure 111/72, pulse 70, weight 68 kg (150 lb), last menstrual period 03/13/2007, not currently breastfeeding.    PHYSICAL EXAM:  General : No distress  Pelvic Exam:  Vulva: No external lesions, fusion of labia bilaterally with hypopigmentation of vulva consistent with LS. No lesions noted or areas of concern to biopsy.   Vagina: Atrophic appearing, normal vaginal diameter.     A/P: 64 year old here for follow up for LS and vulvovaginal atrophy  Rx compounded estradiol cream  Continue clobetasol, can decrease to 2-3x/week if symptoms are stable, recommend at least yearly vulvar skin exams.   Follow up in 1 year    Catrachita Balderrama MD

## 2019-06-27 NOTE — NURSING NOTE
Chief Complaint   Patient presents with     Follow Up     Follow up vulvar exam. Still using the cream. Not sure if cream is helping. [place] and [place] cream so just started using it again.       See SOURAV Ferris 6/27/2019

## 2019-08-28 PROBLEM — Z98.890 S/P BUNIONECTOMY: Status: RESOLVED | Noted: 2019-02-01 | Resolved: 2019-08-28

## 2019-08-28 PROBLEM — M79.674 PAIN OF TOE OF RIGHT FOOT: Status: RESOLVED | Noted: 2019-02-01 | Resolved: 2019-08-28

## 2019-08-28 NOTE — PROGRESS NOTES
Discharge Note    Progress reporting period is from initial eval to Feb 26, 2019.     Carla failed to return for next follow up visit and current status is unknown.  Please see information below for last relevant information on current status.  Patient seen for 4 visits.    SUBJECTIVE  Subjective changes noted by patient:  Pt states she is feeling pretty good overall. Still feels confidence isn't where she wants it to be, feels uncomfortable around slippery and busy areas. Pain isn't as bad, blistering is improving since getting lidocane patch from MD.  .  Current pain level is 0/10.     Previous pain level was  3/10.   Changes in function:  Yes (See Goal flowsheet attached for changes in current functional level)  Adverse reaction to treatment or activity: None    OBJECTIVE  Changes noted in objective findings: 35 degrees great toe extension, 30 degrees flexion great toe R foot     ASSESSMENT/PLAN  Diagnosis: s/p R great toe bunionectomy   DIAGP:  Diagnoses of S/P bunionectomy and Pain of toe of right foot were pertinent to this visit.  STG/LTGs have been met or progress has been made towards goals:  Yes, please see goal flowsheet for most current information  Assessment of Progress: current status is unknown.    Last current status: Pt is progressing well   Self Management Plans:  HEP  I have re-evaluated this patient and find that the nature, scope, duration and intensity of the therapy is appropriate for the medical condition of the patient.  Carla continues to require the following intervention to meet STG and LTG's:  HEP.    Recommendations:  Discharge with current home program.  Patient to follow up with MD as needed.    Please refer to the daily flowsheet for treatment today, total treatment time and time spent performing 1:1 timed codes.

## 2019-09-23 ENCOUNTER — ALLIED HEALTH/NURSE VISIT (OUTPATIENT)
Dept: FAMILY MEDICINE | Facility: CLINIC | Age: 64
End: 2019-09-23
Payer: COMMERCIAL

## 2019-09-23 ENCOUNTER — TELEPHONE (OUTPATIENT)
Dept: PLASTIC SURGERY | Facility: CLINIC | Age: 64
End: 2019-09-23

## 2019-09-23 DIAGNOSIS — Z23 NEED FOR VACCINATION: Primary | ICD-10-CM

## 2019-09-23 DIAGNOSIS — Z23 NEED FOR PROPHYLACTIC VACCINATION AND INOCULATION AGAINST INFLUENZA: ICD-10-CM

## 2019-09-23 PROCEDURE — 90471 IMMUNIZATION ADMIN: CPT

## 2019-09-23 PROCEDURE — 90472 IMMUNIZATION ADMIN EACH ADD: CPT

## 2019-09-23 PROCEDURE — 90750 HZV VACC RECOMBINANT IM: CPT

## 2019-09-23 PROCEDURE — 90682 RIV4 VACC RECOMBINANT DNA IM: CPT

## 2019-09-23 PROCEDURE — 99207 ZZC NO CHARGE LOS: CPT

## 2019-09-23 NOTE — TELEPHONE ENCOUNTER
I left a msg for the pt that the Dr. Kang appt from 10:30am on 9/24/19 was moved to 10/15/19 at 11:15am due to an emergent surgery that came up.  An apology was expessed and if pt needed to reschedule I left the scheduling line number of [phone].

## 2019-10-05 ENCOUNTER — HEALTH MAINTENANCE LETTER (OUTPATIENT)
Age: 64
End: 2019-10-05

## 2019-10-15 ENCOUNTER — OFFICE VISIT (OUTPATIENT)
Dept: PLASTIC SURGERY | Facility: CLINIC | Age: 64
End: 2019-10-15
Attending: OBSTETRICS & GYNECOLOGY
Payer: COMMERCIAL

## 2019-10-15 VITALS
OXYGEN SATURATION: 98 % | DIASTOLIC BLOOD PRESSURE: 67 MMHG | BODY MASS INDEX: 22.73 KG/M2 | HEIGHT: 68 IN | WEIGHT: 150 LBS | RESPIRATION RATE: 16 BRPM | SYSTOLIC BLOOD PRESSURE: 134 MMHG | TEMPERATURE: 98 F | HEART RATE: 55 BPM

## 2019-10-15 DIAGNOSIS — N62 HYPERTROPHY OF BREAST: Primary | ICD-10-CM

## 2019-10-15 PROCEDURE — G0463 HOSPITAL OUTPT CLINIC VISIT: HCPCS | Mod: ZF

## 2019-10-15 ASSESSMENT — PAIN SCALES - GENERAL: PAINLEVEL: NO PAIN (0)

## 2019-10-15 ASSESSMENT — MIFFLIN-ST. JEOR: SCORE: 1282.52

## 2019-10-15 NOTE — PROGRESS NOTES
CONSULT NOTE      REFERRING PHYSICIAN:  Catrachita Balderrama MD      PRESENTING COMPLAINT:  Consultation for breast reduction.      HISTORY OF PRESENTING COMPLAINT:  Ms. Hong is 64 years old.  She has been thinking about a breast reduction for many years now.  Wears a DD bra.  She would like to be around a B cup.  Her last mammogram was in 03/2019 and was normal.  She has a lot of upper back, neck, shoulder pain, shoulder grooving from bra straps, inframammary fold rashes during summers.  She has used all sorts of over-the-counter pain medication as well as supportive garments without continued relief.  She has a family history of breast cancer with her sister, aunt and niece having breast cancer.      PAST MEDICAL HISTORY:  Nil.      PAST SURGICAL HISTORY:  LEEP procedure and node biopsy.      MEDICATIONS:  Nil.      ALLERGIES:  Nil.      SOCIAL HISTORY:  Does not smoke or drink.      REVIEW OF SYSTEMS:  Denies chest pain, shortness of breath, MI, CVA, DVT and PE.      PHYSICAL EXAMINATION:  Vital signs are stable.  She is afebrile, in no obvious distress.  She is 5 feet 8 inches, 150 pounds, BMI of 22.87 kg/m2 and body surface area 1.8 m2.  On examination of her breasts, she has grade 2 ptosis, slightly asymmetric breasts.  Sternal jcizb-lm-dyoopp distance under 40 cm.      ASSESSMENT AND PLAN:  Based on above findings, a diagnosis of symptomatic bilateral breast hypertrophy was made.  I had a long discussion with the patient about breast reduction.  Explained to her how that would be done, showed her where the scars would be.  Explained to her the concept behind a breast reduction.  Explained to her the reality of insurance companies and requirement of prior authorization.  Based on her Schnur scale, 440 grams needs to be removed from each breast.  I think that is possible but would leave her with a disproportionately small breast, about 90% smaller.  The patient is a little concerned about that.   Nonetheless, she has to think about it and let me know if she wants to proceed with that or we will also send her quotes for a private pay situation in which we can take off as much as the patient wants and otherwise do a mastopexy of some kind.  Plan is to get prior authorization for the Schnur scale amount and then proceed as indicated.  All risks, benefits and alternatives of the procedure including pain, infection, bleeding, scarring, asymmetry, seromas, hematomas, wound breakdown, wound dehiscence, skin necrosis, fat necrosis, nipple necrosis, T-junction site necrosis, loss of sensation of the nipple and breast, asymmetric breasts, DVT, PE, MI, CVA, pneumonia, renal failure and death.  She understood everything, wants to think about it and let me know how she wants to proceed.  All questions were answered.  She was happy with the visit.      Total time spent with patient 30 minutes, more than half was counseling.      cc:   Catrachita Balderrama MD   [facility]   [address] [place], [place] [address], [address]   [place]  [address]

## 2019-10-15 NOTE — LETTER
10/15/2019       RE: Carla Hong  [address] [id number]     Dear Colleague,    Thank you for referring your patient, Carla Hong, to the [facility] at [facility]. Please see a copy of my visit note below.    CONSULT NOTE      REFERRING PHYSICIAN:  Catrachita Balderrama MD      PRESENTING COMPLAINT:  Consultation for breast reduction.      HISTORY OF PRESENTING COMPLAINT:  Ms. Hong is 64 years old.  She has been thinking about a breast reduction for many years now.  Wears a DD bra.  She would like to be around a B cup.  Her last mammogram was in 03/2019 and was normal.  She has a lot of upper back, neck, shoulder pain, shoulder grooving from bra straps, inframammary fold rashes during summers.  She has used all sorts of over-the-counter pain medication as well as supportive garments without continued relief.  She has a family history of breast cancer with her sister, aunt and niece having breast cancer.      PAST MEDICAL HISTORY:  Nil.      PAST SURGICAL HISTORY:  LEEP procedure and node biopsy.      MEDICATIONS:  Nil.      ALLERGIES:  Nil.      SOCIAL HISTORY:  Does not smoke or drink.      REVIEW OF SYSTEMS:  Denies chest pain, shortness of breath, MI, CVA, DVT and PE.      PHYSICAL EXAMINATION:  Vital signs are stable.  She is afebrile, in no obvious distress.  She is 5 feet 8 inches, 150 pounds, BMI of 22.87 kg/m2 and body surface area 1.8 m2.  On examination of her breasts, she has grade 2 ptosis, slightly asymmetric breasts.  Sternal rfzky-hl-omcspa distance under 40 cm.      ASSESSMENT AND PLAN:  Based on above findings, a diagnosis of symptomatic bilateral breast hypertrophy was made.  I had a long discussion with the patient about breast reduction.  Explained to her how that would be done, showed her where the scars would be.  Explained to her the concept behind a breast reduction.  Explained to her the reality of  insurance companies and requirement of prior authorization.  Based on her Schnur scale, 440 grams needs to be removed from each breast.  I think that is possible but would leave her with a disproportionately small breast, about 90% smaller.  The patient is a little concerned about that.  Nonetheless, she has to think about it and let me know if she wants to proceed with that or we will also send her quotes for a private pay situation in which we can take off as much as the patient wants and otherwise do a mastopexy of some kind.  Plan is to get prior authorization for the Schnur scale amount and then proceed as indicated.  All risks, benefits and alternatives of the procedure including pain, infection, bleeding, scarring, asymmetry, seromas, hematomas, wound breakdown, wound dehiscence, skin necrosis, fat necrosis, nipple necrosis, T-junction site necrosis, loss of sensation of the nipple and breast, asymmetric breasts, DVT, PE, MI, CVA, pneumonia, renal failure and death.  She understood everything, wants to think about it and let me know how she wants to proceed.  All questions were answered.  She was happy with the visit.      Total time spent with patient 30 minutes, more than half was counseling.      cc:   Catrachita Balderrama MD   [facility]   [address], [address]   [place]  [address]       Again, thank you for allowing me to participate in the care of your patient.      Sincerely,    MOLLY Kang MD

## 2019-10-15 NOTE — NURSING NOTE
"Oncology Rooming Note    October 15, 2019 11:50 AM   Carla Hong is a 64 year old female who presents for:    Chief Complaint   Patient presents with     Oncology Clinic Visit     [facility] NEW- BREAST  HYPERTROPHY     Initial Vitals: /67 (BP Location: Right arm, Patient Position: Chair, Cuff Size: Adult Regular)   Pulse 55   Temp 98  F (36.7  C)   Resp 16   Ht 1.725 m (5' 7.91\")   Wt 68 kg (150 lb)   LMP 03/13/2007   SpO2 98%   BMI 22.87 kg/m   Estimated body mass index is 22.87 kg/m  as calculated from the following:    Height as of this encounter: 1.725 m (5' 7.91\").    Weight as of this encounter: 68 kg (150 lb). Body surface area is 1.81 meters squared.  No Pain (0) Comment: Data Unavailable   Patient's last menstrual period was 03/13/2007.  Allergies reviewed: Yes  Medications reviewed: Yes    Medications: Medication refills not needed today.  Pharmacy name entered into [employer]:    [facility] [place], [place] - [address] [facility] [place], [place] - [address] [facility] [place], [place] - [address] [facility] [phone]  [facility] [place], [place] - [address]    Clinical concerns: No new concerns. Pearl was notified.      Justino Pollock LPN            "

## 2019-10-24 ENCOUNTER — TELEPHONE (OUTPATIENT)
Dept: SURGERY | Facility: CLINIC | Age: 64
End: 2019-10-24

## 2019-12-02 ENCOUNTER — ANCILLARY PROCEDURE (OUTPATIENT)
Dept: GENERAL RADIOLOGY | Facility: CLINIC | Age: 64
End: 2019-12-02
Attending: PODIATRIST
Payer: COMMERCIAL

## 2019-12-02 ENCOUNTER — OFFICE VISIT (OUTPATIENT)
Dept: PODIATRY | Facility: CLINIC | Age: 64
End: 2019-12-02
Payer: COMMERCIAL

## 2019-12-02 VITALS
HEIGHT: 68 IN | DIASTOLIC BLOOD PRESSURE: 79 MMHG | BODY MASS INDEX: 22.73 KG/M2 | HEART RATE: 63 BPM | SYSTOLIC BLOOD PRESSURE: 114 MMHG | WEIGHT: 150 LBS

## 2019-12-02 DIAGNOSIS — Z98.890 S/P FOOT SURGERY, RIGHT: ICD-10-CM

## 2019-12-02 DIAGNOSIS — Z98.890 S/P FOOT SURGERY, RIGHT: Primary | ICD-10-CM

## 2019-12-02 DIAGNOSIS — M20.11 HALLUX VALGUS (ACQUIRED), RIGHT FOOT: ICD-10-CM

## 2019-12-02 PROCEDURE — 99213 OFFICE O/P EST LOW 20 MIN: CPT | Performed by: PODIATRIST

## 2019-12-02 PROCEDURE — 73630 X-RAY EXAM OF FOOT: CPT | Mod: RT

## 2019-12-02 ASSESSMENT — MIFFLIN-ST. JEOR: SCORE: 1282.47

## 2019-12-02 NOTE — LETTER
12/2/2019         RE: Carla Hong  [address] [facility] [address]        Dear Colleague,    Thank you for referring your patient, Carla Hong, to the [facility]. Please see a copy of my visit note below.    Carla returns to the office for reevaluation of the right foot.  The patient relates following the instructions given at the last visit with noted less pain.  The patient relates overall more  improvement in pain and function of the right foot.  The patient relates no other problems.    PAST MEDICAL HISTORY:   Past Medical History:   Diagnosis Date     Vulvar cancer (H) 11/12       BMI= Body mass index is 22.87 kg/m .        Physical Exam:    General: The patient appears to have a pleasant mental affect.    Lower extremity physical exam:  Neurovascular status is intact with palpable pedal pulses and intact epicritic sensations.  Muscular exam is within normal limits to major muscle groups.  Integument is intact.      One notes decreased edema.  One notes improved range of motion of the first metatarsophalangeal joint on the right foot.  No surrounding erythema noted.    Radiograph evaluation including weightbearing AP, lateral and medial oblique views of the right foot reveals interval healing with increased trabeculation of the first metatarsal cuneiform joint with hardware intact    Assessment:      ICD-10-CM    1. S/P foot surgery, right Z98.890 XR Foot Right G/E 3 Views   2. Hallux valgus (acquired), right foot M20.11 XR Foot Right G/E 3 Views       Plan:  I have explained to Carla about the conditions.  At this time, the patient will continue wearing supportive shoes with normal activity.  The patient was instructed return to the office if any problems arise.    Disclaimer: This note consists of symbols derived from keyboarding, dictation and/or voice recognition software. As a result, there may be errors in the script that have gone undetected.  Please consider this when interpreting information found in this chart.       QIANA Interiano D.P.M., [email or web address].[email or web address].S.      Again, thank you for allowing me to participate in the care of your patient.        Sincerely,        Saqib Interiano DPM

## 2019-12-02 NOTE — NURSING NOTE
"Chief Complaint   Patient presents with     Surgical Followup     DOS 12/18/18, Lapidus bunionectomy, right foot. XR taken 2/22/19       Initial /79   Pulse 63   Ht 1.725 m (5' 7.91\")   Wt 68 kg (150 lb)   LMP 03/13/2007   BMI 22.87 kg/m   Estimated body mass index is 22.87 kg/m  as calculated from the following:    Height as of this encounter: 1.725 m (5' 7.91\").    Weight as of this encounter: 68 kg (150 lb).  Medications and allergies reviewed.      Yadira KIM MA    "

## 2019-12-06 NOTE — PROGRESS NOTES
Carla returns to the office for reevaluation of the right foot.  The patient relates following the instructions given at the last visit with noted less pain.  The patient relates overall more  improvement in pain and function of the right foot.  The patient relates no other problems.    PAST MEDICAL HISTORY:   Past Medical History:   Diagnosis Date     Vulvar cancer (H) 11/12       BMI= Body mass index is 22.87 kg/m .        Physical Exam:    General: The patient appears to have a pleasant mental affect.    Lower extremity physical exam:  Neurovascular status is intact with palpable pedal pulses and intact epicritic sensations.  Muscular exam is within normal limits to major muscle groups.  Integument is intact.      One notes decreased edema.  One notes improved range of motion of the first metatarsophalangeal joint on the right foot.  No surrounding erythema noted.    Radiograph evaluation including weightbearing AP, lateral and medial oblique views of the right foot reveals interval healing with increased trabeculation of the first metatarsal cuneiform joint with hardware intact    Assessment:      ICD-10-CM    1. S/P foot surgery, right Z98.890 XR Foot Right G/E 3 Views   2. Hallux valgus (acquired), right foot M20.11 XR Foot Right G/E 3 Views       Plan:  I have explained to Carla about the conditions.  At this time, the patient will continue wearing supportive shoes with normal activity.  The patient was instructed return to the office if any problems arise.    Disclaimer: This note consists of symbols derived from keyboarding, dictation and/or voice recognition software. As a result, there may be errors in the script that have gone undetected. Please consider this when interpreting information found in this chart.       QIANA Interiano D.P.M., FJOSE.[email or web address].S.

## 2020-02-10 ENCOUNTER — HEALTH MAINTENANCE LETTER (OUTPATIENT)
Age: 65
End: 2020-02-10

## 2020-06-17 ENCOUNTER — ANCILLARY PROCEDURE (OUTPATIENT)
Dept: ULTRASOUND IMAGING | Facility: CLINIC | Age: 65
End: 2020-06-17
Attending: FAMILY MEDICINE
Payer: COMMERCIAL

## 2020-06-17 ENCOUNTER — ANCILLARY PROCEDURE (OUTPATIENT)
Dept: MAMMOGRAPHY | Facility: CLINIC | Age: 65
End: 2020-06-17
Attending: FAMILY MEDICINE
Payer: COMMERCIAL

## 2020-06-17 DIAGNOSIS — N64.4 BREAST PAIN, LEFT: ICD-10-CM

## 2020-06-17 PROCEDURE — 76642 ULTRASOUND BREAST LIMITED: CPT | Mod: 50 | Performed by: RADIOLOGY

## 2020-06-17 PROCEDURE — 77066 DX MAMMO INCL CAD BI: CPT | Performed by: RADIOLOGY

## 2020-06-17 PROCEDURE — G0279 TOMOSYNTHESIS, MAMMO: HCPCS | Performed by: RADIOLOGY

## 2020-07-05 ENCOUNTER — MYC MEDICAL ADVICE (OUTPATIENT)
Dept: FAMILY MEDICINE | Facility: CLINIC | Age: 65
End: 2020-07-05

## 2020-07-06 ENCOUNTER — OFFICE VISIT (OUTPATIENT)
Dept: FAMILY MEDICINE | Facility: CLINIC | Age: 65
End: 2020-07-06
Payer: COMMERCIAL

## 2020-07-06 VITALS
BODY MASS INDEX: 22.87 KG/M2 | RESPIRATION RATE: 12 BRPM | HEIGHT: 68 IN | SYSTOLIC BLOOD PRESSURE: 113 MMHG | HEART RATE: 79 BPM | DIASTOLIC BLOOD PRESSURE: 73 MMHG | TEMPERATURE: 97.8 F

## 2020-07-06 DIAGNOSIS — M79.622 AXILLARY PAIN, LEFT: Primary | ICD-10-CM

## 2020-07-06 DIAGNOSIS — M25.511 CHRONIC PAIN OF BOTH SHOULDERS: ICD-10-CM

## 2020-07-06 DIAGNOSIS — C51.9 VULVAR CANCER (H): ICD-10-CM

## 2020-07-06 DIAGNOSIS — M54.2 NECK PAIN: ICD-10-CM

## 2020-07-06 DIAGNOSIS — G89.29 CHRONIC PAIN OF BOTH SHOULDERS: ICD-10-CM

## 2020-07-06 DIAGNOSIS — N62 LARGE BREASTS: ICD-10-CM

## 2020-07-06 DIAGNOSIS — M25.512 CHRONIC PAIN OF BOTH SHOULDERS: ICD-10-CM

## 2020-07-06 PROCEDURE — 99214 OFFICE O/P EST MOD 30 MIN: CPT | Performed by: FAMILY MEDICINE

## 2020-07-06 RX ORDER — CEPHALEXIN 500 MG/1
500 CAPSULE ORAL 3 TIMES DAILY
Qty: 24 CAPSULE | Refills: 0 | Status: SHIPPED | OUTPATIENT
Start: 2020-07-06 | End: 2020-07-14

## 2020-07-06 ASSESSMENT — PAIN SCALES - GENERAL: PAINLEVEL: NO PAIN (0)

## 2020-07-06 NOTE — TELEPHONE ENCOUNTER
Call placed to patient regarding Mychart message.  Agree, she does need a FTF visit, Dr Pablo has availability at [place].  Transferred to scheduling.  Esther Trivedi RN

## 2020-07-06 NOTE — PATIENT INSTRUCTIONS
*   I don't feel any lumps.     *   Let's try a course of antibiotics and see if the pain goes away.     *   Keep me updated.     *    See our reconstructive surgery. [facility]: Plastic and Reconstructive Surgery [facility] [phone]   [email or web address]

## 2020-07-06 NOTE — PROGRESS NOTES
"Subjective     Carla Hong is a 65 year old female who presents to clinic today for the following health issues:    HPI     - Mass in left axilla. Area is \"puffy\" feeling. There is some intermittent shooting pain. No redness or drainage. PMHx vulvar cancer and she had lymph nodes removed in 2012.     - Back pain and stiffness. She is looking into a breast reduction and lift and is wondering if she can get a referral for this.  She notes a 2-3 year  history of neck, shoulder, upper back pain that is progressively becoming worse.  She notes grooves from her bra in the upper shoulders.          Patient Active Problem List   Diagnosis     Chronic rhinitis     Dysphonia     Other diseases of vocal cords     Adjustment disorder with depressed mood     Vulvar lump     Postmenopausal bleeding     Vulvar cancer (H)     Cellulitis of groin, left     Recurrent cellulitis of lower extremity     Ptosis of breast     History of cancer of vulva     Past Surgical History:   Procedure Laterality Date     BUNIONECTOMY Right 12/18/2018    Procedure: 1.  Lapidus bunionectomy right foot  2. hammertoe correction third toe right foot;  Surgeon: Saqib Interiano DPM;  Location: [facility]     COLONOSCOPY  12/6/2010    COLONOSCOPY performed by YRAN OSEGUERA at [facility]     VULVECTOMY RADICAL DISSECT GROIN(S)  11/16/2012    Procedure: VULVECTOMY RADICAL DISSECT GROIN(S);  Radical Vulvectomy Bilateral Inginal Femoral Lymph Node Dissection.;  Surgeon: Henrry Granger MD;  Location:  OR       Social History     Tobacco Use     Smoking status: Never Smoker     Smokeless tobacco: Never Used   Substance Use Topics     Alcohol use: Yes     Comment: 1-2xweek     Family History   Problem Relation Age of Onset     Diabetes Maternal Grandmother      Cancer Mother         renal cell carcinoma     Cancer Son         brain cancer/germinoma     Hypertension No family hx of      Cerebrovascular Disease No family hx of      Breast Cancer No family " "hx of      Cancer - colorectal No family hx of      Prostate Cancer No family hx of      C.A.D. No family hx of          Current Outpatient Medications   Medication Sig Dispense Refill     CALCIUM + D PO 1 daily       MELATONIN PO Take 10 mg by mouth       Multiple Vitamin (DAILY MULTIVITAMIN PO) Take 1 tablet by mouth daily.       clobetasol (TEMOVATE) 0.05 % external ointment Apply topically At Bedtime (Patient not taking: Reported on 10/15/2019) 30 g 1         Reviewed and updated as needed this visit by Provider         Review of Systems   CONSTITUTIONAL: NEGATIVE for fever, chills, change in weight  ENT/MOUTH: NEGATIVE for ear, mouth and throat problems  RESP: NEGATIVE for significant cough or SOB  CV: NEGATIVE for chest pain, palpitations or peripheral edema  MUSCULOSKELETAL: Chronic neck, upper shoulder, and upper back pain.      Objective    /73   Pulse 79   Temp 97.8  F (36.6  C) (Tympanic)   Resp 12   Ht 1.725 m (5' 7.91\")   LMP 03/13/2007   BMI 22.87 kg/m    Body mass index is 22.87 kg/m .  Physical Exam   GENERAL: Healthy, alert and no distress  Skin: Erythematous nodules noted in the left axilla.  EYES: Eyes grossly normal to inspection, conjunctivae and sclerae normal  Neck: supple, there is lower point cervical tenderness to palpation and bilateral paravertebral muscle tenderness to palpation.  RESP: Lungs clear to auscultation - no rales, rhonchi or wheezes  CV: Regular rate and rhythm, normal S1 S2, no murmur  MS: Grooves noted in the above shoulder related to the position of her bra straps.  There is diffuse tenderness to palpation across the superior aspects of both trapezius muscles, across the rhomboid muscles.  NEURO: Normal strength and tone, mentation intact and speech normal  PSYCH: Mentation appears normal, affect normal/bright   Diagnostic Test Results:  Labs reviewed in Epic        Assessment & Plan     (M79.622) Axillary pain, left  (primary encounter diagnosis)  Comment: " Possible abscess, will treat with antibiotics to see if there is resolution of her symptoms.  No dominant masses palpated.  Plan: cephALEXin (KEFLEX) 500 MG capsule      (C51.9) Vulvar cancer (H)  Comment: Appears in remission.  Follow-up per GYN oncology.      (M54.2) Neck pain  Comment: Ongoing neck, upper back discomfort and pain probably secondary to body positioning and the strain of her large breasts.  Plan: PLASTIC SURGERY REFERRAL        Refer for consultation.    (M25.511,  G89.29,  M25.512) Chronic pain of both shoulders  Comment: See above.  Plan: PLASTIC SURGERY REFERRAL      (N62) Large breasts  Comment: See above.    Plan: PLASTIC SURGERY REFERRAL      Patient Instructions   *   I don't feel any lumps.     *   Let's try a course of antibiotics and see if the pain goes away.     *   Keep me updated.     *    See our reconstructive surgery. [facility]: Plastic and Reconstructive Surgery [facility] [phone]   [email or web address]        No follow-ups on file.    Raquel Pablo MD  [facility]

## 2020-07-23 ENCOUNTER — MYC MEDICAL ADVICE (OUTPATIENT)
Dept: FAMILY MEDICINE | Facility: CLINIC | Age: 65
End: 2020-07-23

## 2020-07-23 ENCOUNTER — TELEPHONE (OUTPATIENT)
Dept: SURGERY | Facility: CLINIC | Age: 65
End: 2020-07-23

## 2020-08-19 ENCOUNTER — TELEPHONE (OUTPATIENT)
Dept: SURGERY | Facility: CLINIC | Age: 65
End: 2020-08-19

## 2020-08-19 NOTE — TELEPHONE ENCOUNTER
Patient called and left a message for writer saying she had a consult with Dr. Kang in January and needs to have her surgery done this year. She noted in the voicemail it is a breast recon and lift. She said it was never scheduled because of COVID. Writer does not see orders for this patient. Sent staff message to team.

## 2020-08-20 ENCOUNTER — PATIENT OUTREACH (OUTPATIENT)
Dept: PLASTIC SURGERY | Facility: CLINIC | Age: 65
End: 2020-08-20

## 2020-08-20 NOTE — PATIENT INSTRUCTIONS
Spoke with pt and explained we can proceed with surgery as self pay or can request PA, but need additional documentation. Pt states she would like to proceed with PA. Pt has requested her PCP send documentation multiple times. Pt will contact PCP again. Encouraged her to follow up to confirm we have received appropriate documentation. Roro WELCH RNCC

## 2020-09-22 ENCOUNTER — TELEPHONE (OUTPATIENT)
Dept: SURGERY | Facility: CLINIC | Age: 65
End: 2020-09-22

## 2020-09-28 ENCOUNTER — VIRTUAL VISIT (OUTPATIENT)
Dept: FAMILY MEDICINE | Facility: CLINIC | Age: 65
End: 2020-09-28
Payer: COMMERCIAL

## 2020-09-28 DIAGNOSIS — J01.90 ACUTE SINUSITIS WITH SYMPTOMS > 10 DAYS: Primary | ICD-10-CM

## 2020-09-28 PROCEDURE — 99213 OFFICE O/P EST LOW 20 MIN: CPT | Mod: TEL | Performed by: PHYSICIAN ASSISTANT

## 2020-09-28 RX ORDER — FLUTICASONE PROPIONATE 50 MCG
1 SPRAY, SUSPENSION (ML) NASAL DAILY
Qty: 9 ML | Refills: 0 | Status: SHIPPED | OUTPATIENT
Start: 2020-09-28

## 2020-09-28 NOTE — PROGRESS NOTES
"Carla Hong is a 65 year old female who is being evaluated via a billable telephone visit.      The patient has been notified of following:     \"This telephone visit will be conducted via a call between you and your physician/provider. We have found that certain health care needs can be provided without the need for a physical exam.  This service lets us provide the care you need with a short phone conversation.  If a prescription is necessary we can send it directly to your pharmacy.  If lab work is needed we can place an order for that and you can then stop by our lab to have the test done at a later time.    Telephone visits are billed at different rates depending on your insurance coverage. During this emergency period, for some insurers they may be billed the same as an in-person visit.  Please reach out to your insurance provider with any questions.    If during the course of the call the physician/provider feels a telephone visit is not appropriate, you will not be charged for this service.\"    Patient has given verbal consent for Telephone visit?  Yes    What phone number would you like to be contacted at? [phone]    How would you like to obtain your AVS? Violet    Subjective     Carla Hong is a 65 year old female who presents via phone visit today for the following health issues:    HPI    Acute Illness  Acute illness concerns: sinus infection  Onset/Duration: 10 days  Symptoms:  Fever: no  Chills/Sweats: no  Headache (location?): YES  Sinus Pressure: YES  Conjunctivitis:  YES-eyes burning  Ear Pain: no  Rhinorrhea: no  Congestion: YES  Sore Throat: no  Cough: no  Wheeze: no  Decreased Appetite: YES  Nausea: no  Vomiting: no  Diarrhea: no  Dysuria/Freq.: no  Dysuria or Hematuria: no  Fatigue/Achiness: YES-fatigue   Sick/Strep Exposure: no  Therapies tried and outcome: Sudafed, Dayquil, Nyquil     Feels similar to previous sinus infections she has had for the past few years.  Typically comes " on in the Fall.  Has headache, dental pain (upper left and right).      Has been using afrin for 3 days.          Review of Systems   Constitutional, HEENT, cardiovascular, pulmonary, gi and gu systems are negative, except as otherwise noted.       Objective          Vitals:  No vitals were obtained today due to virtual visit.    healthy, alert and no distress  PSYCH: Alert and oriented times 3; coherent speech, normal   rate and volume, able to articulate logical thoughts, able   to abstract reason, no tangential thoughts, no hallucinations   or delusions  Her affect is normal  RESP: No cough, no audible wheezing, able to talk in full sentences  Remainder of exam unable to be completed due to telephone visits            Assessment/Plan:    Assessment & Plan     Acute sinusitis with symptoms > 10 days  SINUSITIS    * Antibiotics indicated, see orders.  * I discussed the pathophysiology of sinus pressure/sinusitis and treatment options with emphasis on healthy lifestyle and opening up natural drainage passages.  I discussed the risks and benefits of various over the counter and prescription treatments including short courses of decongestant nasal sprays.  If new, worsening or persistent symptoms, the patient is to call or return for a recheck.          - amoxicillin-clavulanate (AUGMENTIN) 875-125 MG tablet; Take 1 tablet by mouth 2 times daily for 10 days  - fluticasone (FLONASE) 50 MCG/ACT nasal spray; Spray 1 spray into both nostrils daily           Return in about 1 week (around 10/5/2020) for a recheck if symptoms do not improve (send [employer] message, may consider ordering covid-19 testing).    Tamar Dimas PA-C  [facility]    Phone call duration:  6 minutes

## 2020-09-30 ENCOUNTER — TELEPHONE (OUTPATIENT)
Dept: SURGERY | Facility: CLINIC | Age: 65
End: 2020-09-30

## 2020-10-01 ENCOUNTER — TELEPHONE (OUTPATIENT)
Dept: SURGERY | Facility: CLINIC | Age: 65
End: 2020-10-01

## 2020-10-01 ENCOUNTER — PATIENT OUTREACH (OUTPATIENT)
Dept: PLASTIC SURGERY | Facility: CLINIC | Age: 65
End: 2020-10-01

## 2020-10-01 DIAGNOSIS — N62 BREAST HYPERTROPHY: Primary | ICD-10-CM

## 2020-10-01 RX ORDER — CEFAZOLIN SODIUM 2 G/50ML
2 SOLUTION INTRAVENOUS
Status: CANCELLED | OUTPATIENT
Start: 2020-10-01

## 2020-10-01 RX ORDER — CEFAZOLIN SODIUM 1 G/50ML
1 INJECTION, SOLUTION INTRAVENOUS SEE ADMIN INSTRUCTIONS
Status: CANCELLED | OUTPATIENT
Start: 2020-10-01

## 2020-10-01 NOTE — TELEPHONE ENCOUNTER
[facility] Call Center    Phone Message    May a detailed message be left on voicemail: yes     Reason for Call: Other: Patient called very upset because her breast reduction surgery is scheduled for January 2021.  Her insurance, [employer], has approved the surgery only thru 12/31/20; therefore, the surgery has to be done before 12/31/20.  Please follow up with patient.  Thank you!     Action Taken: Message routed to:  [facility]    Travel Screening: Not Applicable

## 2020-10-01 NOTE — PATIENT INSTRUCTIONS
Spoke with pt and explained that we have PA approval. Explained that we can move forward with surgery but this would require we follow guidelines to remove at least 440 grams which would leave her with disproportionately smaller breasts, about 90% smaller. Pt states she has thought this over and would like to proceed with surgery despite this result. Explained that this would be relayed to the team and pt would be contacted to schedule. Roro WELCH RNCC

## 2020-10-02 DIAGNOSIS — Z11.59 ENCOUNTER FOR SCREENING FOR OTHER VIRAL DISEASES: Primary | ICD-10-CM

## 2020-10-05 ENCOUNTER — DOCUMENTATION ONLY (OUTPATIENT)
Dept: SURGERY | Facility: CLINIC | Age: 65
End: 2020-10-05

## 2020-10-05 NOTE — PROGRESS NOTES
Patient and writer communicating via [employer]:    Surgery is scheduled with Dr. Kang on 1/14/21 at [facility].  Scheduled per surgeon.    H&P: to be completed by PCP.  POST-OP: 1/26    The surgery packet was provided via [employer]    Pre-op consult with Dr. Kang on 1/12/21    COVID test scheduled for 1/11/21.    They are aware that they will receive a call  ~2 days prior to the scheduled procedure and will be given an exact arrival/start time.

## 2020-10-09 ENCOUNTER — HOSPITAL ENCOUNTER (OUTPATIENT)
Facility: CLINIC | Age: 65
End: 2020-10-09
Attending: PLASTIC SURGERY | Admitting: PLASTIC SURGERY
Payer: COMMERCIAL

## 2020-10-09 DIAGNOSIS — Z11.59 ENCOUNTER FOR SCREENING FOR OTHER VIRAL DISEASES: Primary | ICD-10-CM

## 2020-11-14 ENCOUNTER — HEALTH MAINTENANCE LETTER (OUTPATIENT)
Age: 65
End: 2020-11-14

## 2020-11-24 ENCOUNTER — OFFICE VISIT (OUTPATIENT)
Dept: PLASTIC SURGERY | Facility: CLINIC | Age: 65
End: 2020-11-24
Attending: PLASTIC SURGERY
Payer: COMMERCIAL

## 2020-11-24 VITALS
WEIGHT: 152 LBS | SYSTOLIC BLOOD PRESSURE: 125 MMHG | RESPIRATION RATE: 16 BRPM | HEART RATE: 73 BPM | HEIGHT: 68 IN | DIASTOLIC BLOOD PRESSURE: 80 MMHG | OXYGEN SATURATION: 97 % | BODY MASS INDEX: 23.04 KG/M2 | TEMPERATURE: 97.5 F

## 2020-11-24 DIAGNOSIS — N62 HYPERTROPHY OF BREAST: Primary | ICD-10-CM

## 2020-11-24 PROCEDURE — 99213 OFFICE O/P EST LOW 20 MIN: CPT | Performed by: PLASTIC SURGERY

## 2020-11-24 PROCEDURE — G0463 HOSPITAL OUTPT CLINIC VISIT: HCPCS

## 2020-11-24 ASSESSMENT — MIFFLIN-ST. JEOR: SCORE: 1280.48

## 2020-11-24 ASSESSMENT — PAIN SCALES - GENERAL: PAINLEVEL: NO PAIN (0)

## 2020-11-24 NOTE — LETTER
11/24/2020         RE: Carla Hong  [address] [address]        Dear Colleague,    Thank you for referring your patient, Carla Hong, to the [facility]. Please see a copy of my visit note below.    PREOPERATIVE VISIT NOTE      PRESENTING COMPLAINT:  Preoperative visit for upcoming bilateral breast reduction on 12/02/2020.      HISTORY OF PRESENTING COMPLAINT:  Ms. Hong is 65 years old.  She is undergoing a breast reduction.  The plan is to take off 440 g per side.  Her last mammogram was on 06/17/2020, and it was read as BI-RADS 2, benign.  Otherwise, no change in her history and physical exam.      ASSESSMENT AND PLAN:  Based upon the above findings, a diagnosis of breast hypertrophy requiring breast reduction was made.  The plan is to do the breast reduction as discussed.  I was very clear to the patient that she will be disproportionately small.  She was okay with that.  She may require a free nipple graft.  She was okay with that.  I showed her where the scars would be.  I explained to her the expectations of a free nipple graft.  All the risks, benefits and alternatives of the procedure, including pain, infection, bleeding, scarring, asymmetry, seromas, hematomas, wound breakdown, wound dehiscence, loss of the nipple, requirement of a graft, skin necrosis, fat necrosis, nipple necrosis, T-junction site necrosis, wound-healing complications, disproportionately small breasts, asymmetries, loss of sensation, DVT, PE, MI, CVA, pneumonia, renal failure and death, were explained.  She understood everything and wants to proceed.  I look forward to helping her out in the near future.  All questions were answered.  She was happy with the visit.  All exam was done in the presence of a chaperone.        Total time spent with the patient was 15 minutes, of which more than half was counseling.           Again, thank you for allowing me to  participate in the care of your patient.        Sincerely,        MOLLY Kang MD

## 2020-11-24 NOTE — PROGRESS NOTES
PREOPERATIVE VISIT NOTE      PRESENTING COMPLAINT:  Preoperative visit for upcoming bilateral breast reduction on 12/02/2020.      HISTORY OF PRESENTING COMPLAINT:  Ms. Hong is 65 years old.  She is undergoing a breast reduction.  The plan is to take off 440 g per side.  Her last mammogram was on 06/17/2020, and it was read as BI-RADS 2, benign.  Otherwise, no change in her history and physical exam.      ASSESSMENT AND PLAN:  Based upon the above findings, a diagnosis of breast hypertrophy requiring breast reduction was made.  The plan is to do the breast reduction as discussed.  I was very clear to the patient that she will be disproportionately small.  She was okay with that.  She may require a free nipple graft.  She was okay with that.  I showed her where the scars would be.  I explained to her the expectations of a free nipple graft.  All the risks, benefits and alternatives of the procedure, including pain, infection, bleeding, scarring, asymmetry, seromas, hematomas, wound breakdown, wound dehiscence, loss of the nipple, requirement of a graft, skin necrosis, fat necrosis, nipple necrosis, T-junction site necrosis, wound-healing complications, disproportionately small breasts, asymmetries, loss of sensation, DVT, PE, MI, CVA, pneumonia, renal failure and death, were explained.  She understood everything and wants to proceed.  I look forward to helping her out in the near future.  All questions were answered.  She was happy with the visit.  All exam was done in the presence of a chaperone.        Total time spent with the patient was 15 minutes, of which more than half was counseling.

## 2020-11-24 NOTE — NURSING NOTE
"Oncology Rooming Note    November 24, 2020 9:02 AM   Carla Hong is a 65 year old female who presents for:    Chief Complaint   Patient presents with     Oncology Clinic Visit     Return: Pre-op     Initial Vitals: /80 (BP Location: Right arm, Patient Position: Sitting, Cuff Size: Adult Regular)   Pulse 73   Temp 97.5  F (36.4  C) (Tympanic)   Resp 16   Ht 1.715 m (5' 7.53\")   Wt 68.9 kg (152 lb)   LMP 03/13/2007   SpO2 97%   BMI 23.44 kg/m   Estimated body mass index is 23.44 kg/m  as calculated from the following:    Height as of this encounter: 1.715 m (5' 7.53\").    Weight as of this encounter: 68.9 kg (152 lb). Body surface area is 1.81 meters squared.  No Pain (0) Comment: Data Unavailable   Patient's last menstrual period was 03/13/2007.  Allergies reviewed: Yes  Medications reviewed: Yes    Medications: Medication refills not needed today.  Pharmacy name entered into [employer]:    [facility] [place], [place] - [address] [facility] [place], [place] - [address] [facility] [place], [place] - [phone]  [facility] [place], [place] - [address]    Clinical concerns: N/A      Myra Vizcaino CMA              "

## 2020-11-25 ENCOUNTER — OFFICE VISIT (OUTPATIENT)
Dept: FAMILY MEDICINE | Facility: CLINIC | Age: 65
End: 2020-11-25
Payer: COMMERCIAL

## 2020-11-25 VITALS
RESPIRATION RATE: 16 BRPM | HEIGHT: 68 IN | WEIGHT: 151.7 LBS | HEART RATE: 65 BPM | SYSTOLIC BLOOD PRESSURE: 117 MMHG | BODY MASS INDEX: 22.99 KG/M2 | TEMPERATURE: 97.4 F | DIASTOLIC BLOOD PRESSURE: 77 MMHG

## 2020-11-25 DIAGNOSIS — Z00.00 ENCOUNTER FOR SCREENING AND PREVENTATIVE CARE: ICD-10-CM

## 2020-11-25 DIAGNOSIS — Z01.818 PREOP GENERAL PHYSICAL EXAM: Primary | ICD-10-CM

## 2020-11-25 DIAGNOSIS — N62 HYPERTROPHY OF BREAST: ICD-10-CM

## 2020-11-25 LAB
ANION GAP SERPL CALCULATED.3IONS-SCNC: 6 MMOL/L (ref 3–14)
BUN SERPL-MCNC: 11 MG/DL (ref 7–30)
CALCIUM SERPL-MCNC: 9.6 MG/DL (ref 8.5–10.1)
CHLORIDE SERPL-SCNC: 99 MMOL/L (ref 94–109)
CO2 SERPL-SCNC: 28 MMOL/L (ref 20–32)
CREAT SERPL-MCNC: 0.79 MG/DL (ref 0.52–1.04)
GFR SERPL CREATININE-BSD FRML MDRD: 79 ML/MIN/{1.73_M2}
GLUCOSE SERPL-MCNC: 86 MG/DL (ref 70–99)
POTASSIUM SERPL-SCNC: 4.1 MMOL/L (ref 3.4–5.3)
SODIUM SERPL-SCNC: 133 MMOL/L (ref 133–144)

## 2020-11-25 PROCEDURE — 36415 COLL VENOUS BLD VENIPUNCTURE: CPT | Performed by: FAMILY MEDICINE

## 2020-11-25 PROCEDURE — 80048 BASIC METABOLIC PNL TOTAL CA: CPT | Performed by: FAMILY MEDICINE

## 2020-11-25 PROCEDURE — 99214 OFFICE O/P EST MOD 30 MIN: CPT | Performed by: FAMILY MEDICINE

## 2020-11-25 ASSESSMENT — MIFFLIN-ST. JEOR: SCORE: 1278.67

## 2020-11-25 ASSESSMENT — PAIN SCALES - GENERAL: PAINLEVEL: NO PAIN (0)

## 2020-11-25 NOTE — PROGRESS NOTES
[facility]  [address] [address]  Phone: [phone]  Primary Provider: Raquel Pablo  Pre-op Performing Provider: TERRELL VICTOR    PREOPERATIVE EVALUATION:  Today's date: 11/25/2020    Carla Hong is a 65 year old female who presents for a preoperative evaluation.    Surgical Information:  Surgery/Procedure: Breast Reduction  Surgery Location: [facility]  Surgeon: MOLLY Kang MD  Surgery Date: 12/02/2020  Time of Surgery: 1:00 pm  Where patient plans to recover: At home with family  Fax number for surgical facility: Note does not need to be faxed, will be available electronically in Epic.    Type of Anesthesia Anticipated: General and block    Subjective     HPI related to upcoming procedure: has upper back pain sitting is difficult.     Preop Questions 11/25/2020   1. Have you ever had a heart attack or stroke? No   2. Have you ever had surgery on your heart or blood vessels, such as a stent placement, a coronary artery bypass, or surgery on an artery in your head, neck, heart, or legs? No   3. Do you have chest pain with activity? No   4. Do you have a history of  heart failure? No   5. Do you currently have a cold, bronchitis or symptoms of other infection? No   6. Do you have a cough, shortness of breath, or wheezing? No   7. Do you or anyone in your family have previous history of blood clots? No   8. Do you or does anyone in your family have a serious bleeding problem such as prolonged bleeding following surgeries or cuts? No   9. Have you ever had problems with anemia or been told to take iron pills? No   10. Have you had any abnormal blood loss such as black, tarry or bloody stools, or abnormal vaginal bleeding? No   11. Have you ever had a blood transfusion? No   12. Are you willing to have a blood transfusion if it is medically needed before, during, or after your surgery? Yes   13. Have you or any of your relatives ever had  problems with anesthesia? No   14. Do you have sleep apnea, excessive snoring or daytime drowsiness? No   15. Do you have any artifical heart valves or other implanted medical devices like a pacemaker, defibrillator, or continuous glucose monitor? No   16. Do you have artificial joints? No   17. Are you allergic to latex? No       Health Care Directive:  Patient does not have a Health Care Directive or Living Will:     Preoperative Review of :        Review of Systems  CONSTITUTIONAL: NEGATIVE for fever, chills, change in weight  ENT/MOUTH: NEGATIVE for ear, mouth and throat problems  RESP: NEGATIVE for significant cough or SOB  CV: NEGATIVE for chest pain, palpitations or peripheral edema    Patient Active Problem List    Diagnosis Date Noted     Breast hypertrophy 10/01/2020     Priority: Medium     Added automatically from request for surgery [id number]       History of cancer of vulva 03/15/2019     Priority: Medium     Ptosis of breast 09/17/2013     Priority: Medium     Recurrent cellulitis of lower extremity 02/14/2013     Priority: Medium     Cellulitis of groin, left 01/18/2013     Priority: Medium     Vulvar cancer (H) 11/06/2012     Priority: Medium     Vulvar squamous cell carcinoma  Gynecology oncology referral.  11/7/2012 Oncology consult with Henrry Granger MD - [facility]  11/16/2012 Radical vulvectomy: Invasive well-differentiated squamous cell carcinoma. All surgical margins are free of invasive carcinoma.  12/19/12: Post-op scheduled with Henrry Granger MD. Reminder placed in EPIC to track and follow patient and MD plan  Routine surveillance:3 month surveillance visits for total of 2 years and every 6 months for an additional 3 years.   4/5/13: 3 mo f/u appointment scheduled and canceled.  4/1/14: Surveillance visit. Next due in 3 months. In reminders  8/12/14: Surveillance visit. Next due in 3 months. In reminders  3/6/15:Pap--NIL  12/12/16:Pap--NIL, neg HPV. One more 6 month f/u appt w/exam  to complete 5 year surveillance (6/2017) -in Tracking  02/15/18 Patient is lost to follow-up.   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  Lapidus bunionectomy right foot  2. hammertoe correction third toe right foot;  Surgeon: Saqib Interiano DPM;  Location: [facility]     COLONOSCOPY  12/6/2010    COLONOSCOPY performed by RYAN OSEGUERA at [facility]     VULVECTOMY RADICAL DISSECT GROIN(S)  11/16/2012    Procedure: VULVECTOMY RADICAL DISSECT GROIN(S);  Radical Vulvectomy Bilateral Inginal Femoral Lymph Node Dissection.;  Surgeon: Henrry Granger MD;  Location: [facility]     Current Outpatient Medications   Medication Sig Dispense Refill     CALCIUM + D PO 1 daily       fluticasone (FLONASE) 50 MCG/ACT nasal spray Spray 1 spray into both nostrils daily 9 mL 0     MELATONIN PO Take 10 mg by mouth       Multiple Vitamin (DAILY MULTIVITAMIN PO) Take 1 tablet by mouth daily.         Allergies   Allergen Reactions     Nickel Rash        Social History     Tobacco Use     Smoking status: Never Smoker     Smokeless tobacco: Never Used   Substance Use Topics     Alcohol use: Yes     Comment: 1-2xweek       History   Drug Use No         Objective     LMP 03/13/2007     Physical Exam  GENERAL APPEARANCE: healthy, alert and no distress  HENT: ear canals and TM's normal and nose and mouth without ulcers or  lesions  RESP: lungs clear to auscultation - no rales, rhonchi or wheezes  CV: regular rate and rhythm, normal S1 S2, no S3 or S4 and no murmur, click or rub   ABDOMEN: soft, nontender, no HSM or masses and bowel sounds normal  NEURO: Normal strength and tone, sensory exam grossly normal, mentation intact and speech normal    Recent Labs   Lab Test 03/15/19  0945   HGB 12.9         POTASSIUM 4.0   CR 0.62        Diagnostics:  No labs were ordered during this visit.   No EKG required for low risk surgery (cataract, skin procedure, breast biopsy, etc).    Revised Cardiac Risk Index (RCRI):  The patient has the following serious cardiovascular risks for perioperative complications:   - No serious cardiac risks = 0 points     RCRI Interpretation: 0 points: Class I (very low risk - 0.4% complication rate)         Assessment & Plan   The proposed surgical procedure is considered LOW risk.    Preop general physical exam    Risks and Recommendations:  The patient has the following additional risks and recommendations for perioperative complications:   - No identified additional risk factors other than previously addressed  (Z01.818) Preop general physical exam  (primary encounter diagnosis)  Plan: Basic metabolic panel        (Z00.00) Encounter for screening and preventative care  Plan: Lipid panel reflex to direct LDL Fasting,          (N62) Hypertrophy of breast  Okay for surgery.     RECOMMENDATION:  APPROVAL GIVEN to proceed with proposed procedure, without further diagnostic evaluation.    Signed Electronically by: Saqib Andrews MD    Copy of this evaluation report is provided to requesting physician.    Preop [facility] Preop Guidelines    Revised Cardiac Risk Index

## 2020-11-25 NOTE — PATIENT INSTRUCTIONS

## 2020-11-30 DIAGNOSIS — Z11.59 ENCOUNTER FOR SCREENING FOR OTHER VIRAL DISEASES: ICD-10-CM

## 2020-11-30 PROCEDURE — U0003 INFECTIOUS AGENT DETECTION BY NUCLEIC ACID (DNA OR RNA); SEVERE ACUTE RESPIRATORY SYNDROME CORONAVIRUS 2 (SARS-COV-2) (CORONAVIRUS DISEASE [COVID-19]), AMPLIFIED PROBE TECHNIQUE, MAKING USE OF HIGH THROUGHPUT TECHNOLOGIES AS DESCRIBED BY CMS-2020-01-R: HCPCS | Performed by: PLASTIC SURGERY

## 2020-12-01 ENCOUNTER — ANESTHESIA EVENT (OUTPATIENT)
Dept: SURGERY | Facility: AMBULATORY SURGERY CENTER | Age: 65
End: 2020-12-01

## 2020-12-01 LAB
LABORATORY COMMENT REPORT: NORMAL
SARS-COV-2 RNA SPEC QL NAA+PROBE: NEGATIVE
SARS-COV-2 RNA SPEC QL NAA+PROBE: NORMAL
SPECIMEN SOURCE: NORMAL
SPECIMEN SOURCE: NORMAL

## 2020-12-01 RX ORDER — NALOXONE HYDROCHLORIDE 0.4 MG/ML
0.2 INJECTION, SOLUTION INTRAMUSCULAR; INTRAVENOUS; SUBCUTANEOUS
Status: DISCONTINUED | OUTPATIENT
Start: 2020-12-01 | End: 2020-12-03 | Stop reason: HOSPADM

## 2020-12-01 RX ORDER — NALOXONE HYDROCHLORIDE 0.4 MG/ML
0.4 INJECTION, SOLUTION INTRAMUSCULAR; INTRAVENOUS; SUBCUTANEOUS
Status: DISCONTINUED | OUTPATIENT
Start: 2020-12-01 | End: 2020-12-03 | Stop reason: HOSPADM

## 2020-12-02 ENCOUNTER — HOSPITAL ENCOUNTER (OUTPATIENT)
Facility: AMBULATORY SURGERY CENTER | Age: 65
Discharge: HOME | End: 2020-12-02
Attending: PLASTIC SURGERY | Admitting: PLASTIC SURGERY
Payer: COMMERCIAL

## 2020-12-02 ENCOUNTER — ANESTHESIA (OUTPATIENT)
Dept: SURGERY | Facility: AMBULATORY SURGERY CENTER | Age: 65
End: 2020-12-02

## 2020-12-02 VITALS
HEART RATE: 66 BPM | WEIGHT: 152 LBS | OXYGEN SATURATION: 99 % | RESPIRATION RATE: 18 BRPM | HEIGHT: 68 IN | SYSTOLIC BLOOD PRESSURE: 115 MMHG | TEMPERATURE: 97.5 F | BODY MASS INDEX: 23.04 KG/M2 | DIASTOLIC BLOOD PRESSURE: 77 MMHG

## 2020-12-02 DIAGNOSIS — N62 BREAST HYPERTROPHY: ICD-10-CM

## 2020-12-02 PROCEDURE — 88305 TISSUE EXAM BY PATHOLOGIST: CPT | Mod: 26 | Performed by: PATHOLOGY

## 2020-12-02 PROCEDURE — 19318 BREAST REDUCTION: CPT | Mod: 50

## 2020-12-02 RX ORDER — FENTANYL CITRATE 50 UG/ML
25-50 INJECTION, SOLUTION INTRAMUSCULAR; INTRAVENOUS
Status: DISCONTINUED | OUTPATIENT
Start: 2020-12-02 | End: 2020-12-03 | Stop reason: HOSPADM

## 2020-12-02 RX ORDER — GABAPENTIN 300 MG/1
300 CAPSULE ORAL ONCE
Status: COMPLETED | OUTPATIENT
Start: 2020-12-02 | End: 2020-12-02

## 2020-12-02 RX ORDER — NALOXONE HYDROCHLORIDE 0.4 MG/ML
0.2 INJECTION, SOLUTION INTRAMUSCULAR; INTRAVENOUS; SUBCUTANEOUS
Status: DISCONTINUED | OUTPATIENT
Start: 2020-12-02 | End: 2020-12-03 | Stop reason: HOSPADM

## 2020-12-02 RX ORDER — MEPERIDINE HYDROCHLORIDE 25 MG/ML
12.5 INJECTION INTRAMUSCULAR; INTRAVENOUS; SUBCUTANEOUS
Status: DISCONTINUED | OUTPATIENT
Start: 2020-12-02 | End: 2020-12-03 | Stop reason: HOSPADM

## 2020-12-02 RX ORDER — SODIUM CHLORIDE, SODIUM LACTATE, POTASSIUM CHLORIDE, CALCIUM CHLORIDE 600; 310; 30; 20 MG/100ML; MG/100ML; MG/100ML; MG/100ML
INJECTION, SOLUTION INTRAVENOUS CONTINUOUS
Status: DISCONTINUED | OUTPATIENT
Start: 2020-12-02 | End: 2020-12-02 | Stop reason: HOSPADM

## 2020-12-02 RX ORDER — KETAMINE HYDROCHLORIDE 10 MG/ML
INJECTION, SOLUTION INTRAMUSCULAR; INTRAVENOUS PRN
Status: DISCONTINUED | OUTPATIENT
Start: 2020-12-02 | End: 2020-12-02

## 2020-12-02 RX ORDER — AMOXICILLIN 250 MG
1-2 CAPSULE ORAL 2 TIMES DAILY
Qty: 30 TABLET | Refills: 0 | Status: SHIPPED | OUTPATIENT
Start: 2020-12-02 | End: 2020-12-14

## 2020-12-02 RX ORDER — ONDANSETRON 2 MG/ML
4 INJECTION INTRAMUSCULAR; INTRAVENOUS EVERY 30 MIN PRN
Status: DISCONTINUED | OUTPATIENT
Start: 2020-12-02 | End: 2020-12-03 | Stop reason: HOSPADM

## 2020-12-02 RX ORDER — ACETAMINOPHEN 325 MG/1
975 TABLET ORAL ONCE
Status: COMPLETED | OUTPATIENT
Start: 2020-12-02 | End: 2020-12-02

## 2020-12-02 RX ORDER — OXYCODONE HYDROCHLORIDE 5 MG/1
5 TABLET ORAL EVERY 4 HOURS PRN
Status: DISCONTINUED | OUTPATIENT
Start: 2020-12-02 | End: 2020-12-03 | Stop reason: HOSPADM

## 2020-12-02 RX ORDER — NALOXONE HYDROCHLORIDE 0.4 MG/ML
0.4 INJECTION, SOLUTION INTRAMUSCULAR; INTRAVENOUS; SUBCUTANEOUS
Status: DISCONTINUED | OUTPATIENT
Start: 2020-12-02 | End: 2020-12-03 | Stop reason: HOSPADM

## 2020-12-02 RX ORDER — ONDANSETRON 4 MG/1
4-8 TABLET, ORALLY DISINTEGRATING ORAL EVERY 8 HOURS PRN
Qty: 4 TABLET | Refills: 0 | Status: SHIPPED | OUTPATIENT
Start: 2020-12-02 | End: 2021-05-12

## 2020-12-02 RX ORDER — FLUMAZENIL 0.1 MG/ML
0.2 INJECTION, SOLUTION INTRAVENOUS
Status: DISCONTINUED | OUTPATIENT
Start: 2020-12-02 | End: 2020-12-02 | Stop reason: HOSPADM

## 2020-12-02 RX ORDER — ONDANSETRON 4 MG/1
4 TABLET, ORALLY DISINTEGRATING ORAL EVERY 30 MIN PRN
Status: DISCONTINUED | OUTPATIENT
Start: 2020-12-02 | End: 2020-12-03 | Stop reason: HOSPADM

## 2020-12-02 RX ORDER — OXYCODONE HYDROCHLORIDE 5 MG/1
5-10 TABLET ORAL EVERY 6 HOURS PRN
Qty: 20 TABLET | Refills: 0 | Status: SHIPPED | OUTPATIENT
Start: 2020-12-02 | End: 2020-12-14

## 2020-12-02 RX ORDER — BUPIVACAINE HYDROCHLORIDE 2.5 MG/ML
INJECTION, SOLUTION EPIDURAL; INFILTRATION; INTRACAUDAL PRN
Status: DISCONTINUED | OUTPATIENT
Start: 2020-12-02 | End: 2020-12-02

## 2020-12-02 RX ORDER — SODIUM CHLORIDE, SODIUM LACTATE, POTASSIUM CHLORIDE, CALCIUM CHLORIDE 600; 310; 30; 20 MG/100ML; MG/100ML; MG/100ML; MG/100ML
INJECTION, SOLUTION INTRAVENOUS CONTINUOUS
Status: DISCONTINUED | OUTPATIENT
Start: 2020-12-02 | End: 2020-12-03 | Stop reason: HOSPADM

## 2020-12-02 RX ORDER — CEFAZOLIN SODIUM 2 G/50ML
2 SOLUTION INTRAVENOUS
Status: COMPLETED | OUTPATIENT
Start: 2020-12-02 | End: 2020-12-02

## 2020-12-02 RX ORDER — LIDOCAINE HYDROCHLORIDE 20 MG/ML
INJECTION, SOLUTION INFILTRATION; PERINEURAL PRN
Status: DISCONTINUED | OUTPATIENT
Start: 2020-12-02 | End: 2020-12-02

## 2020-12-02 RX ORDER — DEXAMETHASONE SODIUM PHOSPHATE 4 MG/ML
INJECTION, SOLUTION INTRA-ARTICULAR; INTRALESIONAL; INTRAMUSCULAR; INTRAVENOUS; SOFT TISSUE PRN
Status: DISCONTINUED | OUTPATIENT
Start: 2020-12-02 | End: 2020-12-02

## 2020-12-02 RX ORDER — ONDANSETRON 2 MG/ML
INJECTION INTRAMUSCULAR; INTRAVENOUS PRN
Status: DISCONTINUED | OUTPATIENT
Start: 2020-12-02 | End: 2020-12-02

## 2020-12-02 RX ORDER — PROPOFOL 10 MG/ML
INJECTION, EMULSION INTRAVENOUS CONTINUOUS PRN
Status: DISCONTINUED | OUTPATIENT
Start: 2020-12-02 | End: 2020-12-02

## 2020-12-02 RX ORDER — GLYCOPYRROLATE 0.2 MG/ML
INJECTION, SOLUTION INTRAMUSCULAR; INTRAVENOUS PRN
Status: DISCONTINUED | OUTPATIENT
Start: 2020-12-02 | End: 2020-12-02

## 2020-12-02 RX ORDER — PROPOFOL 10 MG/ML
INJECTION, EMULSION INTRAVENOUS PRN
Status: DISCONTINUED | OUTPATIENT
Start: 2020-12-02 | End: 2020-12-02

## 2020-12-02 RX ORDER — FENTANYL CITRATE 50 UG/ML
25-50 INJECTION, SOLUTION INTRAMUSCULAR; INTRAVENOUS
Status: DISCONTINUED | OUTPATIENT
Start: 2020-12-02 | End: 2020-12-02 | Stop reason: HOSPADM

## 2020-12-02 RX ORDER — CEFAZOLIN SODIUM 1 G/50ML
1 SOLUTION INTRAVENOUS SEE ADMIN INSTRUCTIONS
Status: DISCONTINUED | OUTPATIENT
Start: 2020-12-02 | End: 2020-12-02 | Stop reason: HOSPADM

## 2020-12-02 RX ADMIN — ACETAMINOPHEN 975 MG: 325 TABLET ORAL at 12:02

## 2020-12-02 RX ADMIN — SODIUM CHLORIDE, SODIUM LACTATE, POTASSIUM CHLORIDE, CALCIUM CHLORIDE: 600; 310; 30; 20 INJECTION, SOLUTION INTRAVENOUS at 12:04

## 2020-12-02 RX ADMIN — PROPOFOL 170 MG: 10 INJECTION, EMULSION INTRAVENOUS at 14:54

## 2020-12-02 RX ADMIN — LIDOCAINE HYDROCHLORIDE 75 MG: 20 INJECTION, SOLUTION INFILTRATION; PERINEURAL at 14:54

## 2020-12-02 RX ADMIN — BUPIVACAINE HYDROCHLORIDE 20 ML: 2.5 INJECTION, SOLUTION EPIDURAL; INFILTRATION; INTRACAUDAL at 13:00

## 2020-12-02 RX ADMIN — OXYCODONE HYDROCHLORIDE 5 MG: 5 TABLET ORAL at 17:08

## 2020-12-02 RX ADMIN — PROPOFOL 125 MCG/KG/MIN: 10 INJECTION, EMULSION INTRAVENOUS at 15:43

## 2020-12-02 RX ADMIN — KETAMINE HYDROCHLORIDE 25 MG: 10 INJECTION, SOLUTION INTRAMUSCULAR; INTRAVENOUS at 15:20

## 2020-12-02 RX ADMIN — Medication 0.5 MG: at 16:08

## 2020-12-02 RX ADMIN — FENTANYL CITRATE 25 MCG: 50 INJECTION, SOLUTION INTRAMUSCULAR; INTRAVENOUS at 17:04

## 2020-12-02 RX ADMIN — GLYCOPYRROLATE 0.2 MG: 0.2 INJECTION, SOLUTION INTRAMUSCULAR; INTRAVENOUS at 14:46

## 2020-12-02 RX ADMIN — CEFAZOLIN SODIUM 2 G: 2 SOLUTION INTRAVENOUS at 15:08

## 2020-12-02 RX ADMIN — GABAPENTIN 300 MG: 300 CAPSULE ORAL at 12:02

## 2020-12-02 RX ADMIN — PROPOFOL 150 MCG/KG/MIN: 10 INJECTION, EMULSION INTRAVENOUS at 14:55

## 2020-12-02 RX ADMIN — ONDANSETRON 4 MG: 2 INJECTION INTRAMUSCULAR; INTRAVENOUS at 14:58

## 2020-12-02 RX ADMIN — KETAMINE HYDROCHLORIDE 25 MG: 10 INJECTION, SOLUTION INTRAMUSCULAR; INTRAVENOUS at 15:09

## 2020-12-02 RX ADMIN — DEXAMETHASONE SODIUM PHOSPHATE 4 MG: 4 INJECTION, SOLUTION INTRA-ARTICULAR; INTRALESIONAL; INTRAMUSCULAR; INTRAVENOUS; SOFT TISSUE at 14:58

## 2020-12-02 ASSESSMENT — MIFFLIN-ST. JEOR: SCORE: 1294.32

## 2020-12-02 NOTE — DISCHARGE INSTRUCTIONS
BREAST REDUCTION POST-OPERATIVE INSTRUCTIONS    Instructions       Have someone drive you home after surgery and help you at home for 1-2      days.      Get plenty of rest.      Follow balanced diet.      Decreased activity may promote constipation, so you may want to add      more raw fruit to your diet, and be sure to increase fluid intake. Your doctor       may also order a stool softener.      Take pain medication as prescribed. Do not take aspirin or any products      containing aspirin.      Do not drink alcohol when taking pain medications.      Even when not taking pain medications, no alcohol for 3 weeks as it      causes fluid retention.      If you are taking vitamins with iron, resume these as tolerated.      Do not smoke, as smoking delays healing and increases the risk of      complications.    Activities      Do not drive fpr 10 days following your procedure and until you are no longer taking        any pain medications (narcotics).      Do not drive until you have full range of motion with your arms and can stop the car       or swerve in an emergency.      Start walking the evening of surgery, this helps to reduce swelling and       lowers the chance of blood clots.      Refrain from vigorous activities for 2-6 weeks.  Increase activity gradually as tolerated.      After 2 weeks, you may perform lower body exercise, but must wait the full 6 weeks       prior to performing upper body exercise      Avoid lifting anything over 5 pounds for 2 weeks.      Resume social and employment activities in about 2 weeks (if not too strenuous).    Incision Care      You may shower in 48 hours.  If drainage tubes have been used, you may shower       48 hours after removal of the drains. The ACE wrap (if used) may be rewrapped as needed (if too tight or loose). Use it for support and may subtitute with a sports bra if preferred.       Avoid exposing scars to sun for at least 12 months.      Always use a strong  "sunblock, if sun exposure is unavoidable (SPF 50 or      greater).      Keep the tape on until it starts to curl up on the ends, and then gently remove them.        You may use moisturizing cream at 10 days to help the tape come off. By two weeks,      you may pull off the tape if still in place.      Wear your surgical bra/wrap 24/7 as directed for 4 weeks.      Avoid bras with stays and underwires for 4-6 weeks.      You may pad the incisions with gauze for comfort (panty liners also work well as they        are absorbent and inexpensive).      Inspect daily for signs of infection.      No tub soaking, bathing, or swimming until wounds are healed.      If your breast skin is dry after surgery, you can apply a moisturizer several times a       day.     What to Expect      Despite the three layers of sutures closing your incisions, there will be some oozing       of tissue fluid from them for 2 days or so.  This will soak up on the gauze and the bra       to look like more than it really is.  Report any significant drainage to the clinic.      Most of the higher discomfort will subside after the first few days.      You may experience temporary soreness, bruising, swelling and tightnessin the       breasts as well as discomfort in the incision area.      You may have have normal sensation in the nipples.  This may be more or less than       usual, and usually returns over a couple of months.      Your first menstruation following surgery may cause your breasts to swell and hurt.      You may have random shooting pains, tingling, or other strange sensations in the            skin for a few months.  These will subside.    Appearance      Most of the discoloration and swelling will subside in 2-4 weeks.      Your breasts will feel firm to the touch initially, but will soften with time.      A more natural shape will occur as the breasts \"settle\" in a slightly lower position over     the first few months.      Scars may " be red and thick for 6-12 months (longer in lighter-skinned patients).  IN          time, these usually soften and fade.    Follow-Up Care      Typically, you will have a post op check at 1-2 weeks, and again with your surgeon in      another month.      If drainage tubes have been used, will be removed when the drainage is less than 30      ml per day for 1-2 days.  This usually happens in 1-3 weeks.    When to Call      If you have increased swelling or bruising, particular one side greater than the other.      If swelling and redness persist after a few days.      If you have increased redness along the incision.      If you have severe or increased pain not relieved by medication.      If you have any side effects to medications; such as, rash, nausea,      headache, vomiting, or constipation.      If you have an oral temperature over 100.4 degrees.      If you have any yellowish or greenish drainage from the incisions or      notice a foul odor.      If you have bleeding from the incisions that is difficult to control with      light pressure.      If you have loss of feeling or motion.      Any unanswered concern.    For Medical Questions, Please Call:      [phone], Monday - Friday, 8 a.m. - 4:30 p.m.      After hours and on weekends, call Hospital Paging at [phone] and      ask for the Plastic Surgeon on call.    [facility]  Home Care Following Anesthesia  For 24 hours after surgery:  1. Get plenty of rest.  A responsible adult must stay with you for at least 24 hours after you leave the surgery center.  2. Do not drive or use heavy equipment.  If you have weakness or tingling, don't drive or use heavy equipment until this feeling goes away.   3. Do not drink alcohol.   4. Avoid strenuous or risky activities.  Ask for help when climbing stairs.  5. You may feel lightheaded.  IF so, sit for a few minutes before standing.  Have someone help you get up.   6. If you  "have nausea (feel sick to your stomach): Drink only clear liquids such as apple juice, ginger ale, broth or 7-Up.  Rest may also help.  Be sure to drink enough fluids.  Move to a regular diet as you feel able.   7. You may have a slight fever.  Call the doctor if your fever is over 100 F (37.7 C) (taken under the tongue) or lasts longer than 24 hours.  8. You may have a dry mouth, a sore throat, muscle aches or trouble sleeping. These should go away after 24 hours.  9. Do not make important or legal decisions.        Today you received an Exparel block to numb the nerves near your surgery site.  This is a block using local anesthetic or \"numbing\" medication injected around the nerves to anesthetize or \"numb\" the area supplied by those nerves.  This block is injected into the muscle layer near your surgical site.  This medication may numb the location where you had surgery up to 72 hours.  If your surgical site is an arm or leg you should be careful with your affected limb, since it is possible to injure your limb without being aware of it due to the numbing.  Until full feeling returns, you should guard against bumping or hitting your limb, and avoid extreme hot or cold temperatures on the skin.  As the block wears off, the feeling will return as a tingling or prickly sensation near your surgical site.  You will experince more discomfort from your incision as the feeling returns.  You may want to take a pain pill (a narcotic or Tylenol if this was prescribed by your surgeon) when you start to experience mild pain before the pain beomes more severe.  If your pain medications do not control your pain, you should notify your surgeon.    Tips for taking pain medications  To get the best pain relief possible, remember these points:    Take pain medications as directed, before pain becomes severe.    Pain medication can upset your stomach: taking it with food may help.    Constipation is a common side effect of pain " medication. Drink plenty of  fluids.    Eat foods high in fiber. Take a stool softener if recommended by your doctor or pharmacist.    Do not drink alcohol, drive or operate machinery while taking pain medications.    Ask about other ways to control pain, such as with heat, ice or relaxation.    Tylenol/Acetaminophen Consumption  To help encourage the safe use of acetaminophen, the makers of TYLENOL  have lowered the maximum daily dose for single-ingredient Extra Strength TYLENOL  (acetaminophen) products sold in the [place] from 8 pills per day (4,000 mg) to 6 pills per day (3,000 mg). The dosing interval has also changed from 2 pills every 4-6 hours to 2 pills every 6 hours.    If you feel your pain relief is insufficient, you may take Tylenol/Acetaminophen in addition to your narcotic pain medication.     Be careful not to exceed 3,000 mg of Tylenol/Acetaminophen in a 24 hour period from all sources.    If you are taking extra strength Tylenol/acetaminophen (500 mg), the maximum dose is 6 tablets in 24 hours.    If you are taking regular strength acetaminophen (325 mg), the maximum dose is 9 tablets in 24 hours.    Call a doctor for any of the followin. Signs of infection (fever, growing tenderness at the surgery site, a large amount of drainage or bleeding, severe pain, foul-smelling drainage, redness, swelling).  2. It has been over 8 to 10 hours since surgery and you are still not able to urinate (pass water).  3. Headache for over 24 hours.  4. Numbness, tingling or weakness the day after surgery (if you had spinal anesthesia).  5. Signs of Covid-19 infection (temperature over 100 degrees, shortness of breath, cough, loss of taste/smell, generalized body aches, persistent headache, chills, sore throat, nausea/vomiting/diarrhea)  Your doctor is:  Dr. Gilberto Kang, Plastic Surgery: [phone]                Or dial [phone] and ask for the resident on call for:  Plastics  For emergency care, call the:   "[facility]:  [phone] (TTY for hearing impaired: [phone])   Information about liposomal bupivacaine (Exparel)    What is Liposomal Bupivacaine?    Liposomal Bupivacaine is a numbing medication that can help you manage your pain after surgery.  This medication is similar to \"novacaine,\" which is often used by the dentist.  Liposomal bupivacaine is released slowly and can help control pain for up to 72 hours.    What is the purpose of Liposomal Bupivacaine?    To manage your pain after surgery    To help you sleep better, take deep breaths, walk more comfortable, and feel up to visiting with others    How is the procedure done?    Liposomal bupivacaine is a medication given by an injection.    It is usually given right before your surgery.  If this is the case, you will be awake or sedated, but you should experience minimal pain during the procedure.    For some people, the injection may be given at the very end of your surgery.  It all depends on the type of surgery and your situation.    The procedure usually takes about 5-15 minutes.  An ultrasound machine will help the anesthesiologist insert it in the right place or the surgeon will inject it under direct vision.     A needle is used to place the numbing medication under your skin.  It provides pain relief by numbing the tissue in the area where your surgeon will make the incision.    What can I expect?    You may experience numbness, tingling, or a feeling of heaviness around the area that was injected.    If you experience any of the follow symptoms IMMEDIATELY CALL THE REGIONAL ANESTHESIA PAIN SERVICE:    Numbness or tingling occurs in areas other than around the injection site    Blurry vision    Ringing in your ears    A metallic taste in your mouth    PAGE: Dial [phone].  When prompted, enter the following 4-digit ID number:  [id number].  You will be prompted to enter your phone number; and then enter the # sign.  The clinician " on call will call you back.    OR  CALL: Dial [phone].  Let the hospital  know that you are having a problem with a nerve block and that you would like to speak to the regional anesthesia pain service right away.    You should not receive any other type of numbing medication within 4 days after receiving liposomal bupivacaine unless your anesthesiologist approves.    Post Operative Instructions: Regional Anesthetic with Liposomal Bupivacaine for Chest and Abdominal Surgery  General Information:   Regional anesthesia is when local anesthetic or  numbing  medication is injected around the nerves to anesthetize or  numb  the area supplied by that set of nerves.     Types of Regional Blocks:  Transversus Abdominis Plane (TAP): A block injected beneath the covering of a muscle layer of the abdomen for abdominal surgery  Pectoral: A block injected near the breast for surgery on the breast and armpit  Paravertebral: A block injected in the back for surgery on the chest, ribs, and breast    Procedure:  The type of anesthesia your doctor used to numb your chest or abdomen will usually not wear off for 24-48 hours, but may last as long as 72 hours.     Diet:  There are no restrictions on your diet. You should drink plenty of fluids.     Discomfort:  You will have a tingling and prickly sensation in your chest or abdomen as the feeling begins to return. You can also expect some discomfort. The amount of discomfort is unpredictable, but if you have more pain than can be controlled with pain medication you should notify your physician.     Pain Medicine:   Begin taking your oral pain pills before bedtime and during the night to avoid a sudden onset of pain as part of the block wears off.  Do not engage in drinking, driving, or hazardous occupations while taking pain medication.     Stitches:   You may have stitches or special skin closures. You doctor will inform you when to return to the office to have them  removed.

## 2020-12-02 NOTE — OP NOTE
PREOPERATIVE DIAGNOSIS: Symptomatic bilateral breast hypertrophy.     POSTOPERATIVE DIAGNOSIS: Symptomatic bilateral breast hypertrophy.     PROCEDURES: Bilateral superomedial pedicle inverted T skin closure breast reduction.     SURGEON: Gilberto Kang MD.     RESIDENT: Smith Roman MD.    ANESTHESIA: General anesthesia with endotracheal intubation.     COMPLICATIONS: Nil.     DRAINS: Nil.     Blood Loss: 150 mL    SPECIMENS: Skin and breast tissue from right breast measuring about 475 g, left breast measuring about 300 g.     DESCRIPTION OF PROCEDURE: After informed consent was taken, the proper site and procedure was ascertained with the patient and was appropriately marked and taken to in the operating room.  She was placed in supine position with the knees comfortably flexed with pillows underneath them, and pneumoboots placed and running prior to induction of anesthesia. Preoperative antibiotics given in the OR. All pressure points were appropriately padded. General anesthesia was administered without any complications. She was placed in such a position that she could be flexed to about 50 degrees. Her arms were padded and abducted to about 50 degrees. She was prepped and draped in a standard surgical fashion. I began by first remarking the preop markings and marking a 42 mm areola on each side. I then marked out a superior medial pedicle on each side. I then de-epithelialized the pedicle on each side. I then dissected out the pedicle on each side without actually seeing the deep fascia and also released the pedicle such that it could rotate into its new nipple position without any tension. I then went ahead and on each side excised the inferomedial, inferior, inferolateral and lateral aspects of the breast according to a vertical pattern reduction. Strict hemostasis was ensured during this entire part of the case. Once this was done, I then temporarily closed the patient's breast in an inverted T fashion,  sat the patient up ensured symmetry and then marked out the new areolar opening symmetrically on each side. I then went ahead and closed the horizontal incision using 2-0 Monocryl suture in a deep dermal layer. Then I made sure that the nipple areolar complex could be retrieved without any tension, which is could on each side. I then checked that they were both pink and viable, which they were. I then went ahead and excised the skin of the new areolar opening and de-epithelialized epithelialized the portion that was involved in the pedicle on each side. I then sutured in the nipple areolar complex using 2-0 Monocryl suture in a deep dermal fashion circumferentially. I then closed the vertical incision using 2-0 Monocryl suture to approximate the medial and lateral pillars, and then the deep dermis. I then ran all the incisions with 3-0 Strattafix suture in a running intracuticular manner followed by placement of Prineo, and then followed by an ACE wrap. At the end of the case, the patient's breasts were soft, nipples were pink, and breasts were symmetric. The patient tolerated the procedure well. All counts correct at the end of the case. The patient was extubated and sent to recovery room in a stable condition.

## 2020-12-02 NOTE — OR NURSING
Patient received bilateral pectoralis nerve block with Exparel.  Versed 1mg given. Tolerated procedure well.

## 2020-12-02 NOTE — ANESTHESIA POSTPROCEDURE EVALUATION
Anesthesia POST Procedure Evaluation    Patient: Carla Hong   MRN:     [id number] Gender:   female   Age:    65 year old :      1955        Preoperative Diagnosis: Breast hypertrophy [N62]   Procedure(s):  MAMMOPLASTY, REDUCTION, BILATERAL   Postop Comments: No value filed.     Anesthesia Type: General, Peripheral Nerve Block       Disposition: Outpatient   Postop Pain Control: Uneventful            Sign Out: Well controlled pain   PONV: No   Neuro/Psych: Uneventful            Sign Out: Acceptable/Baseline neuro status   Airway/Respiratory: Uneventful            Sign Out: Acceptable/Baseline resp. status   CV/Hemodynamics: Uneventful            Sign Out: Acceptable CV status   Other NRE: NONE   DID A NON-ROUTINE EVENT OCCUR? No         Last Anesthesia Record Vitals:  [facility] VITALS  2020 1604 - 2020 1704      2020             Resp Rate (set):  10          Last PACU Vitals:  Vitals Value Taken Time   /71 20 1712   Temp 36.3  C (97.3  F) 20 1712   Pulse     Resp 18 20 1712   SpO2 98 % 20 1712   Temp src     NIBP     Pulse     SpO2     Resp     Temp     Ht Rate     Temp 2           Electronically Signed By: Singh Urban MD, 2020, 5:20 PM

## 2020-12-02 NOTE — ANESTHESIA PROCEDURE NOTES
Peripheral Nerve Block Procedure Note      Staff -   Anesthesiologist:  Filipe Donohue MD  Resident/Fellow: Jimenez Norris MD  Performed By: anesthesiologist and with fellow  Location: Pre-op  Procedure Start/Stop TImes:     patient identified, IV checked, site marked, risks and benefits discussed, informed consent, monitors and equipment checked, pre-op evaluation, at physician/surgeon's request and post-op pain management      Correct Patient: Yes      Correct Position: Yes      Correct Site: Yes      Correct Procedure: Yes      Correct Laterality:  Yes    Site Marked:  Yes  Procedure details:     Procedure:  Pectoralis    Diagnosis:  Postoperative pain relief    Laterality:  Bilateral    Position:  Sitting    Sterile Prep: chloraprep, mask and sterile gloves      Local skin infiltration:  None    Needle:  Insulated    Needle gauge:  21    Needle length (mm):  110    Ultrasound: Yes      Ultrasound used to identify targeted nerve, plexus, or vascular structure and placed a needle adjacent to it      Permanent Image entered into patiient's record      Abnormal pain on injection: No      Blood Aspirated: No      Paresthesias:  No    Bleeding at site: No      Bolus via:  Needle    Infusion Method:  Single Shot    Complications:  None  Assessment/Narrative:     Injection made incrementally with aspirations every (mL):  5     266 mg of Exparel used in pectoralis 2 block

## 2020-12-02 NOTE — ANESTHESIA CARE TRANSFER NOTE
Patient: Carla Hong    Procedure(s):  MAMMOPLASTY, REDUCTION, BILATERAL    Diagnosis: Breast hypertrophy [N62]  Diagnosis Additional Information: No value filed.    Anesthesia Type:   General, Peripheral Nerve Block     Note:  Airway :Face Mask  Patient transferred to:PACU  Comments: VSS and WNL, comfortable, no PONV, report to Meagan RNHandoff Report: Identifed the Patient, Identified the Reponsible Provider, Reviewed the pertinent medical history, Discussed the surgical course, Reviewed Intra-OP anesthesia mangement and issues during anesthesia, Set expectations for post-procedure period and Allowed opportunity for questions and acknowledgement of understanding      Vitals: (Last set prior to Anesthesia Care Transfer)    [facility] VITALS  12/2/2020 1604 - 12/2/2020 1637      12/2/2020             Resp Rate (set):  10                Electronically Signed By: TRUDY Mary CRNA  December 2, 2020  4:37 PM

## 2020-12-02 NOTE — ANESTHESIA PREPROCEDURE EVALUATION
"Anesthesia Pre-Procedure Evaluation    Patient: Carla Hong   MRN:     [id number] Gender:   female   Age:    65 year old :      1955        Preoperative Diagnosis: Breast hypertrophy [N62]   Procedure(s):  MAMMOPLASTY, REDUCTION, BILATERAL     LABS:  CBC:   Lab Results   Component Value Date    WBC 5.3 03/15/2019    WBC 8.0 2018    HGB 12.9 03/15/2019    HGB 13.2 2018    HCT 38.8 03/15/2019    HCT 39.6 2018     03/15/2019     2018     BMP:   Lab Results   Component Value Date     2020     03/15/2019    POTASSIUM 4.1 2020    POTASSIUM 4.0 03/15/2019    CHLORIDE 99 2020    CHLORIDE 102 03/15/2019    CO2 28 2020    CO2 26 03/15/2019    BUN 11 2020    BUN 8 03/15/2019    CR 0.79 2020    CR 0.62 03/15/2019    GLC 86 2020    GLC 76 03/15/2019     COAGS:   Lab Results   Component Value Date    INR 1.10 2013     POC: No results found for: BGM, HCG, HCGS  OTHER:   Lab Results   Component Value Date    ONDINA 9.6 2020    TSH 0.82 03/15/2019        Preop Vitals    BP Readings from Last 3 Encounters:   20 117/77   20 125/80   20 113/73    Pulse Readings from Last 3 Encounters:   20 65   20 73   20 79      Resp Readings from Last 3 Encounters:   20 16   20 16   20 12    SpO2 Readings from Last 3 Encounters:   20 97%   10/15/19 98%   18 97%      Temp Readings from Last 1 Encounters:   20 36.3  C (97.4  F) (Tympanic)    Ht Readings from Last 1 Encounters:   20 1.715 m (5' 7.5\")      Wt Readings from Last 1 Encounters:   20 68.8 kg (151 lb 11.2 oz)    Estimated body mass index is 23.41 kg/m  as calculated from the following:    Height as of 20: 1.715 m (5' 7.5\").    Weight as of 20: 68.8 kg (151 lb 11.2 oz).     LDA:        Past Medical History:   Diagnosis Date     Vulvar cancer (H)       Past Surgical History: "   Procedure Laterality Date     BUNIONECTOMY Right 12/18/2018    Procedure: 1.  Lapidus bunionectomy right foot  2. hammertoe correction third toe right foot;  Surgeon: Saqib Interiano DPM;  Location: [facility]     COLONOSCOPY  12/6/2010    COLONOSCOPY performed by RYAN OSEGUERA at [facility]     VULVECTOMY RADICAL DISSECT GROIN(S)  11/16/2012    Procedure: VULVECTOMY RADICAL DISSECT GROIN(S);  Radical Vulvectomy Bilateral Inginal Femoral Lymph Node Dissection.;  Surgeon: Henrry Granger MD;  Location: [facility]      Allergies   Allergen Reactions     Nickel Rash        Anesthesia Evaluation     . Pt has had prior anesthetic. Type: General    No history of anesthetic complications          ROS/MED HX    ENT/Pulmonary:     (+)allergic rhinitis, , . .    Neurologic:  - neg neurologic ROS     Cardiovascular:  - neg cardiovascular ROS       METS/Exercise Tolerance:  >4 METS   Hematologic:  - neg hematologic  ROS       Musculoskeletal:  - neg musculoskeletal ROS       GI/Hepatic:  - neg GI/hepatic ROS       Renal/Genitourinary:  - ROS Renal section negative       Endo:  - neg endo ROS       Psychiatric:     (+) psychiatric history depression      Infectious Disease:  - neg infectious disease ROS       Malignancy:   (+) Malignancy History of Other          Other:    - neg other ROS                     PHYSICAL EXAM:   Mental Status/Neuro: A/A/O   Airway: Facies: Feasible  Mallampati: I  Mouth/Opening: Full  TM distance: > 6 cm  Neck ROM: Full   Respiratory: Auscultation: CTAB     Resp. Rate: Normal     Resp. Effort: Normal      CV:   Rate: Age appropriate  Edema: None   Comments:      Dental: Normal Dentition                Assessment:   ASA SCORE: 2    H&P: History and physical reviewed and following examination; no interval change.   Smoking Status:  Non-Smoker/Unknown   NPO Status: NPO Appropriate     Plan:   Anes. Type:  General; Peripheral Nerve Block     Block Details: Single Shot; Exparel (Pectoralis 2)    Pre-Medication: None   Induction:  IV (Standard)   Airway: LMA   Access/Monitoring: PIV   Maintenance: Balanced     Postop Plan:   Postop Pain: Opioids  Postop Sedation/Airway: Not planned     PONV Management:   Adult Risk Factors: Female, Non-Smoker, Postop Opioids   Prevention: Ondansetron, Dexamethasone     CONSENT: Direct conversation   Plan and risks discussed with: Patient   Blood Products: Consent Deferred (Minimal Blood Loss)       Comments for Plan/Consent:  Patient was informed of my disclosures, the potential for being prescribed a 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

## 2020-12-07 LAB — COPATH REPORT: NORMAL

## 2020-12-14 ENCOUNTER — OFFICE VISIT (OUTPATIENT)
Dept: FAMILY MEDICINE | Facility: CLINIC | Age: 65
End: 2020-12-14
Payer: COMMERCIAL

## 2020-12-14 VITALS
DIASTOLIC BLOOD PRESSURE: 74 MMHG | HEIGHT: 68 IN | RESPIRATION RATE: 18 BRPM | TEMPERATURE: 97.7 F | BODY MASS INDEX: 22.88 KG/M2 | OXYGEN SATURATION: 98 % | SYSTOLIC BLOOD PRESSURE: 136 MMHG | HEART RATE: 82 BPM | WEIGHT: 151 LBS

## 2020-12-14 DIAGNOSIS — Z12.4 CERVICAL CANCER SCREENING: Primary | ICD-10-CM

## 2020-12-14 PROCEDURE — 99213 OFFICE O/P EST LOW 20 MIN: CPT | Performed by: PHYSICIAN ASSISTANT

## 2020-12-14 PROCEDURE — G0145 SCR C/V CYTO,THINLAYER,RESCR: HCPCS | Performed by: PHYSICIAN ASSISTANT

## 2020-12-14 PROCEDURE — 87624 HPV HI-RISK TYP POOLED RSLT: CPT | Performed by: PHYSICIAN ASSISTANT

## 2020-12-14 ASSESSMENT — MIFFLIN-ST. JEOR: SCORE: 1283.43

## 2020-12-14 NOTE — PROGRESS NOTES
"   SUBJECTIVE:   CC: Carla Hong is an 65 year old woman who presents for preventive health visit.     {Split Bill scripting  The purpose of this visit is to discuss your medical history and prevent health problems before you are sick. You may be responsible for a co-pay, coinsurance, or deductible if your visit today includes services such as checking on a sore throat, having an x-ray or lab test, or treating and evaluating a new or existing condition :011954}  Patient has been advised of split billing requirements and indicates understanding: {Yes and No:094946}  Healthy Habits:    Do you get at least three servings of calcium containing foods daily (dairy, green leafy vegetables, etc.)? { :748957::\"yes\"}    Amount of exercise or daily activities, outside of work: { :774124}    Problems taking medications regularly { :179104::\"No\"}    Medication side effects: { :261232::\"No\"}    Have you had an eye exam in the past two years? { :865084}    Do you see a dentist twice per year? { :509086}    Do you have sleep apnea, excessive snoring or daytime drowsiness?{ :586215}  {Outside tests to abstract? :158628}    {additional problems to add (Optional):857068}    Today's PHQ-2 Score:   PHQ-2 ( 1999 [employer]) 11/25/2020 4/2/2019   Q1: Little interest or pleasure in doing things 0 0   Q2: Feeling down, depressed or hopeless 0 0   PHQ-2 Score 0 0     {PHQ-2 LOOK IN ASSESSMENTS (Optional) :268412}  Abuse: Current or Past(Physical, Sexual or Emotional)- {YES/NO/NA:607228}  Do you feel safe in your environment? {YES/NO/NA:937672}        Social History     Tobacco Use     Smoking status: Never Smoker     Smokeless tobacco: Never Used   Substance Use Topics     Alcohol use: Yes     Comment: 1-2xweek     If you drink alcohol do you typically have >3 drinks per day or >7 drinks per week? {ETOH :754682}                     Reviewed orders with patient.  Reviewed health maintenance and updated orders accordingly - " "{Yes/No:626577::\"Yes\"}  {Chronicprobdata (Optional):456795}    {Mammo Decision Support (Optional):086632}    Pertinent mammograms are reviewed under the imaging tab.  History of abnormal Pap smear: {PAP HX:457004}  PAP / HPV Latest Ref Rng & Units 4/2/2019 12/12/2016 3/6/2015   PAP - NIL NIL NIL   HPV 16 DNA NEG:Negative Negative Negative -   HPV 18 DNA NEG:Negative Negative Negative -   OTHER HR HPV NEG:Negative Negative Negative -     Reviewed and updated as needed this visit by clinical staff                 Reviewed and updated as needed this visit by Provider                {HISTORY OPTIONS (Optional):099527}    ROS:  { :208522}    OBJECTIVE:   LMP 03/13/2007   EXAM:  {Exam Choices:322104}    {Diagnostic Test Results (Optional):601838::\"Diagnostic Test Results:\",\"Labs reviewed in Epic\"}    ASSESSMENT/PLAN:   {Diag Picklist:145461}    Patient has been advised of split billing requirements and indicates understanding: {YES / NO:456238::\"Yes\"}  COUNSELING:   {FEMALE COUNSELING MESSAGES:404914::\"Reviewed preventive health counseling, as reflected in patient instructions\"}    Estimated body mass index is 22.84 kg/m  as calculated from the following:    Height as of 12/2/20: 1.737 m (5' 8.4\").    Weight as of 12/2/20: 68.9 kg (152 lb).    {Weight Management Plan (ACO) Complete if BMI is abnormal-  Ages 18-64  BMI >24.9.  Age 65+ with BMI <23 or >30 (Optional):211142}    She reports that she has never smoked. She has never used smokeless tobacco.      Counseling Resources:  ATP IV Guidelines  Pooled Cohorts Equation Calculator  Breast Cancer Risk Calculator  BRCA-Related Cancer Risk Assessment: FHS-7 Tool  FRAX Risk Assessment  ICSI Preventive Guidelines  Dietary Guidelines for Americans, 2010  USDA's MyPlate  ASA Prophylaxis  Lung CA Screening    ZAYRA Winkler [facility] REYNA  "

## 2020-12-14 NOTE — PROGRESS NOTES
"Subjective     Carla Hong is a 65 year old female who presents to clinic today for the following health issues:    HPI            Gyn Exam   Does not feel she needs full annual physical done today.   Would like to just have her pap smear completed in today's visit.     No menses.  No abnormal pap's in the past.         Review of Systems   Constitutional, HEENT, cardiovascular, pulmonary, gi and gu systems are negative, except as otherwise noted.      Objective    /74   Pulse 82   Temp 97.7  F (36.5  C) (Tympanic)   Resp 18   Ht 1.727 m (5' 8\")   Wt 68.5 kg (151 lb)   LMP 03/13/2007   SpO2 98%   Breastfeeding No   BMI 22.96 kg/m    Body mass index is 22.96 kg/m .  Physical Exam   GENERAL: healthy, alert and no distress   (female): normal female external genitalia, normal urethral meatus, vaginal mucosa, normal cervix/adnexa/uterus without masses or discharge            Assessment & Plan     Cervical cancer screening  I discussed the new pap recommendations regarding screening.  Explained the rationale for increased intervals between paps.  Questions asked and answered.  This will be her last pap if normal today.     - Pap imaged thin layer screen with HPV - recommended age 30 - 65 years (select HPV order below)  - HPV High Risk Types DNA Cervical            Return in about 6 months (around 6/14/2021) for Physical Exam.    ZAYRA Winkler [facility] REYNA    "

## 2020-12-15 ENCOUNTER — OFFICE VISIT (OUTPATIENT)
Dept: PLASTIC SURGERY | Facility: CLINIC | Age: 65
End: 2020-12-15
Payer: COMMERCIAL

## 2020-12-15 VITALS
BODY MASS INDEX: 22.96 KG/M2 | TEMPERATURE: 98 F | DIASTOLIC BLOOD PRESSURE: 81 MMHG | OXYGEN SATURATION: 98 % | HEIGHT: 68 IN | SYSTOLIC BLOOD PRESSURE: 136 MMHG | HEART RATE: 66 BPM

## 2020-12-15 DIAGNOSIS — N62 MACROMASTIA: Primary | ICD-10-CM

## 2020-12-15 PROCEDURE — 99024 POSTOP FOLLOW-UP VISIT: CPT | Performed by: PHYSICIAN ASSISTANT

## 2020-12-15 ASSESSMENT — PAIN SCALES - GENERAL: PAINLEVEL: NO PAIN (0)

## 2020-12-15 NOTE — NURSING NOTE
"Chief Complaint   Patient presents with     Surgical Followup     2wk post op BRM, DOS 12/2 ([place])       Vitals:    12/15/20 0951   BP: 136/81   BP Location: Left arm   Patient Position: Chair   Cuff Size: Adult Regular   Pulse: 66   Temp: 98  F (36.7  C)   TempSrc: Oral   SpO2: 98%   Height: 1.727 m (5' 8\")       Body mass index is 22.96 kg/m .    David Otoole, EMT    "

## 2020-12-15 NOTE — LETTER
"12/15/2020       RE: Carla Hong  [address] [address]     Dear Colleague,    Thank you for referring your patient, Carla Hong, to the [facility] [place] [facility]. Please see a copy of my visit note below.    Plastic Surgery Outpatient Visit    ID: Carla Hong is a 65 year old female s/p bilateral breast reduction 12/2/20.     S: doing well. Has issues with dressing sticking to L breast tape. Took oxy for 3 days only. Wearing sports bra. Overall quite happy with results.     O:  /81 (BP Location: Left arm, Patient Position: Chair, Cuff Size: Adult Regular)   Pulse 66   Temp 98  F (36.7  C) (Oral)   Ht 1.727 m (5' 8\")   LMP 03/13/2007   SpO2 98%   BMI 22.96 kg/m     General: NAD  Chest: bilateral breasts with incisions c/d/i. Tape intact. Nipples viable. Mild ecchymosis and swelling to inferior poles bilaterally.     PATH:   FINAL DIAGNOSIS:   A: RIGHT BREAST, REDUCTION MAMMOPLASTY (489.2 G):   - Benign breast tissue with fibrocystic change including microcysts,   apocrine metaplasia and duct ectasia.   - No atypical or malignant findings.     B: LEFT BREAST, REDUCTION MAMMOPLASTY (290.2 G):   - Benign breast tissue with fibrocystic change.   - Skin with seborrheic keratosis.   - No atypical or malignant findings.     A/P:  -healing well  -ok for soft bra  -moisturize, ok for tape to come off  -no heavy lifting or underwire bra for 6 weeks  -RTC PRN    Total time spent with patient was 15 min of which greater than 50% was in counseling.    Loree Cabral PA-C  Plastic and Reconstructive Surgery        "

## 2020-12-15 NOTE — PROGRESS NOTES
"Plastic Surgery Outpatient Visit    ID: Carla Hong is a 65 year old female s/p bilateral breast reduction 12/2/20.     S: doing well. Has issues with dressing sticking to L breast tape. Took oxy for 3 days only. Wearing sports bra. Overall quite happy with results.     O:  /81 (BP Location: Left arm, Patient Position: Chair, Cuff Size: Adult Regular)   Pulse 66   Temp 98  F (36.7  C) (Oral)   Ht 1.727 m (5' 8\")   LMP 03/13/2007   SpO2 98%   BMI 22.96 kg/m     General: NAD  Chest: bilateral breasts with incisions c/d/i. Tape intact. Nipples viable. Mild ecchymosis and swelling to inferior poles bilaterally.     PATH:   FINAL DIAGNOSIS:   A: RIGHT BREAST, REDUCTION MAMMOPLASTY (489.2 G):   - Benign breast tissue with fibrocystic change including microcysts,   apocrine metaplasia and duct ectasia.   - No atypical or malignant findings.     B: LEFT BREAST, REDUCTION MAMMOPLASTY (290.2 G):   - Benign breast tissue with fibrocystic change.   - Skin with seborrheic keratosis.   - No atypical or malignant findings.     A/P:  -healing well  -ok for soft bra  -moisturize, ok for tape to come off  -no heavy lifting or underwire bra for 6 weeks  -RTC PRN    Total time spent with patient was 15 min of which greater than 50% was in counseling.    Loree Cabral PA-C  Plastic and Reconstructive Surgery      "

## 2020-12-16 LAB
COPATH REPORT: NORMAL
PAP: NORMAL

## 2020-12-22 ENCOUNTER — TELEPHONE (OUTPATIENT)
Dept: SURGERY | Facility: CLINIC | Age: 65
End: 2020-12-22

## 2020-12-22 NOTE — TELEPHONE ENCOUNTER
[facility] Call Center    Phone Message    May a detailed message be left on voicemail: yes     Reason for Call: Symptoms or Concerns     If patient has red-flag symptoms, warm transfer to triage line    Current symptom or concern: Pt has some redness from nipples down also blothcy redness where she removed the tape. Pt stated slighty warm to touch. Just wants to be sure it is normal before going out of town for the Holiday    Symptoms have been present for:  2 day(s)    Has patient previously been seen for this? No        Action Taken: Message routed to:  [facility] ([facility]urg     Travel Screening: Not Applicable

## 2020-12-22 NOTE — TELEPHONE ENCOUNTER
Spoke with pt regarding post operative concerns. Pt states she removed the surgical tape on Saturday. States last night she started to have increased tenderness, around the incisions directly below her nipples. States both incisions are red/blotchy, and warm to the touch. Pt states there are small pinpoint openings at the T intersection site. States there is scant, serous fluid from both sites. Pt denies any fevers or chills. Pt to send photos of the area via [employer] for review and will be contacted back with recommendation. Roro WELCH RN, BSN

## 2020-12-30 ENCOUNTER — NURSE TRIAGE (OUTPATIENT)
Dept: NURSING | Facility: CLINIC | Age: 65
End: 2020-12-30

## 2020-12-30 DIAGNOSIS — N62 BREAST HYPERTROPHY: Primary | ICD-10-CM

## 2020-12-30 RX ORDER — SULFAMETHOXAZOLE/TRIMETHOPRIM 800-160 MG
1 TABLET ORAL 2 TIMES DAILY
Qty: 20 TABLET | Refills: 0 | Status: SHIPPED | OUTPATIENT
Start: 2020-12-30 | End: 2021-01-13

## 2020-12-30 NOTE — TELEPHONE ENCOUNTER
Call and spoke to patient, per Dr. Kang, send rx for Bactrim DS BID x 10 days. Rx sent to [facility]. Patient will follow up with Dr. Kang on 1/5/2021 at 9AM.    Marcela Monterroso LPN

## 2020-12-30 NOTE — TELEPHONE ENCOUNTER
On 12/15/2020   Pt had Macromastia.    Now Pt reporting, redness warmth, by the incision area.  No fever.  Pt wondering if she has an infection and needing medication sent to the pharmacy for Pt care.      Please call Pt back @ [phone] for further assistance.      Dariela Santa RN  Central Triage Red Flags/Med Refills

## 2021-01-04 ASSESSMENT — ENCOUNTER SYMPTOMS
SKIN CHANGES: 0
POOR WOUND HEALING: 1
BREAST PAIN: 1
NAIL CHANGES: 0
BREAST MASS: 0

## 2021-01-05 ENCOUNTER — OFFICE VISIT (OUTPATIENT)
Dept: PLASTIC SURGERY | Facility: CLINIC | Age: 66
End: 2021-01-05
Attending: PLASTIC SURGERY
Payer: COMMERCIAL

## 2021-01-05 VITALS
BODY MASS INDEX: 22.69 KG/M2 | HEIGHT: 68 IN | HEART RATE: 78 BPM | DIASTOLIC BLOOD PRESSURE: 74 MMHG | RESPIRATION RATE: 16 BRPM | SYSTOLIC BLOOD PRESSURE: 130 MMHG | TEMPERATURE: 97.4 F | OXYGEN SATURATION: 99 %

## 2021-01-05 DIAGNOSIS — N62 BREAST HYPERTROPHY: Primary | ICD-10-CM

## 2021-01-05 PROCEDURE — G0463 HOSPITAL OUTPT CLINIC VISIT: HCPCS

## 2021-01-05 PROCEDURE — 99024 POSTOP FOLLOW-UP VISIT: CPT | Performed by: PLASTIC SURGERY

## 2021-01-05 ASSESSMENT — PAIN SCALES - GENERAL: PAINLEVEL: NO PAIN (0)

## 2021-01-05 NOTE — PROGRESS NOTES
POSTOPERATIVE VISIT      PRESENTING COMPLAINT:  Postoperative visit status post bilateral breast reduction done 12/02/2020.       HISTORY OF PRESENTING COMPLAINT:  Ms. Hong is 65 years old.  She is about a month out from surgery.  Overall, doing well.  She was complaining of some redness in the lower portions of the breasts bilaterally.  No fevers, no drainage.      PHYSICAL EXAMINATION:     VITAL SIGNS:  Stable.  She is afebrile, in no obvious distress.     CHEST:  Both breasts are healed.  No evidence for infection.  They are aesthetic and symmetric.      ASSESSMENT AND PLAN:  Based on above findings, a diagnosis of bilateral breast reduction was made.  She is healing well.  Advised aggressive moisturization, allow everything to heal over the next couple of weeks.  She should get to know her breasts well.  Continue with yearly mammograms.  I will see her back p.r.n.

## 2021-01-05 NOTE — LETTER
1/5/2021         RE: Carla Hong  [address] [address]        Dear Colleague,    Thank you for referring your patient, Carla Hong, to the [facility] BREAST [facility]. Please see a copy of my visit note below.    POSTOPERATIVE VISIT      PRESENTING COMPLAINT:  Postoperative visit status post bilateral breast reduction done 12/02/2020.       HISTORY OF PRESENTING COMPLAINT:  Ms. Hong is 65 years old.  She is about a month out from surgery.  Overall, doing well.  She was complaining of some redness in the lower portions of the breasts bilaterally.  No fevers, no drainage.      PHYSICAL EXAMINATION:     VITAL SIGNS:  Stable.  She is afebrile, in no obvious distress.     CHEST:  Both breasts are healed.  No evidence for infection.  They are aesthetic and symmetric.      ASSESSMENT AND PLAN:  Based on above findings, a diagnosis of bilateral breast reduction was made.  She is healing well.  Advised aggressive moisturization, allow everything to heal over the next couple of weeks.  She should get to know her breasts well.  Continue with yearly mammograms.  I will see her back p.r.n.           Again, thank you for allowing me to participate in the care of your patient.        Sincerely,        [facility] Kang MD

## 2021-01-05 NOTE — NURSING NOTE
"Oncology Rooming Note    January 5, 2021 9:07 AM   Carla Hong is a 65 year old female who presents for:    Chief Complaint   Patient presents with     Oncology Clinic Visit     Return: Breast hypertrophy     Initial Vitals: /74 (BP Location: Right arm, Patient Position: Sitting, Cuff Size: Adult Regular)   Pulse 78   Temp 97.4  F (36.3  C) (Tympanic)   Resp 16   Ht 1.737 m (5' 8.4\")   LMP 03/13/2007   SpO2 99%   BMI 22.69 kg/m   Estimated body mass index is 22.69 kg/m  as calculated from the following:    Height as of this encounter: 1.737 m (5' 8.4\").    Weight as of 12/14/20: 68.5 kg (151 lb). Body surface area is 1.82 meters squared.  No Pain (0) Comment: Data Unavailable   Patient's last menstrual period was 03/13/2007.  Allergies reviewed: Yes  Medications reviewed: Yes    Medications: Medication refills not needed today.  Pharmacy name entered into [facility]:    [facility] [place], [place] - [address] [facility] [place], [place] - [address] [facility] [place], [place] - [phone]  [facility] [place], [place] - [phone]  [facility] [place], [place] - [address]    Clinical concerns: N/A      Myra Vizcaino CMA              "

## 2021-01-05 NOTE — LETTER
1/5/2021      RE: Carla Hong  [address] [address]       POSTOPERATIVE VISIT      PRESENTING COMPLAINT:  Postoperative visit status post bilateral breast reduction done 12/02/2020.       HISTORY OF PRESENTING COMPLAINT:  Ms. Hong is 65 years old.  She is about a month out from surgery.  Overall, doing well.  She was complaining of some redness in the lower portions of the breasts bilaterally.  No fevers, no drainage.      PHYSICAL EXAMINATION:     VITAL SIGNS:  Stable.  She is afebrile, in no obvious distress.     CHEST:  Both breasts are healed.  No evidence for infection.  They are aesthetic and symmetric.      ASSESSMENT AND PLAN:  Based on above findings, a diagnosis of bilateral breast reduction was made.  She is healing well.  Advised aggressive moisturization, allow everything to heal over the next couple of weeks.  She should get to know her breasts well.  Continue with yearly mammograms.  I will see her back p.r.n.         M Gilberto Kang MD

## 2021-01-13 ENCOUNTER — OFFICE VISIT (OUTPATIENT)
Dept: FAMILY MEDICINE | Facility: CLINIC | Age: 66
End: 2021-01-13
Payer: COMMERCIAL

## 2021-01-13 VITALS
RESPIRATION RATE: 16 BRPM | TEMPERATURE: 97 F | DIASTOLIC BLOOD PRESSURE: 82 MMHG | SYSTOLIC BLOOD PRESSURE: 137 MMHG | HEART RATE: 64 BPM

## 2021-01-13 DIAGNOSIS — L57.0 AK (ACTINIC KERATOSIS): ICD-10-CM

## 2021-01-13 DIAGNOSIS — B07.8 OTHER VIRAL WARTS: Primary | ICD-10-CM

## 2021-01-13 PROCEDURE — 99207 PR DROP WITH A PROCEDURE: CPT | Performed by: FAMILY MEDICINE

## 2021-01-13 PROCEDURE — 17110 DESTRUCTION B9 LES UP TO 14: CPT | Performed by: FAMILY MEDICINE

## 2021-01-13 ASSESSMENT — PAIN SCALES - GENERAL: PAINLEVEL: NO PAIN (0)

## 2021-01-13 NOTE — PROGRESS NOTES
SUBJECTIVE:                                                    Carla Hong is a 65 year old female who presents to clinic today for the following health issues:    WART(S)  Onset: a month    Description:   Location: on upper nose near right eye  Number of warts: 1  Painful: no     Accompanying Signs & Symptoms:  Signs of infection: no     History:   History of trauma: no   Prior warts: YES    Therapies Tried and outcome: none    -She says that her right eye near where the wart is has been dry and puffy. That has also been ongoing for about a month.    Problem list and histories reviewed & adjusted, as indicated.  Additional history:     Patient Active Problem List   Diagnosis     Chronic rhinitis     Dysphonia     Other diseases of vocal cords     Adjustment disorder with depressed mood     Vulvar lump     Postmenopausal bleeding     Vulvar cancer (H)     Cellulitis of groin, left     Recurrent cellulitis of lower extremity     Ptosis of breast     History of cancer of vulva     Breast hypertrophy     Past Surgical History:   Procedure Laterality Date     BUNIONECTOMY Right 12/18/2018    Procedure: 1.  Lapidus bunionectomy right foot  2. hammertoe correction third toe right foot;  Surgeon: Saqib Interiano DPM;  Location: [facility]     COLONOSCOPY  12/6/2010    COLONOSCOPY performed by RYAN OSEGUERA at [facility]     MAMMOPLASTY REDUCTION BILATERAL Bilateral 12/2/2020    Procedure: MAMMOPLASTY, REDUCTION, BILATERAL;  Surgeon: MOLLY Kang MD;  Location: [facility] [place] [facility]     VULVECTOMY RADICAL DISSECT GROIN(S)  11/16/2012    Procedure: VULVECTOMY RADICAL DISSECT GROIN(S);  Radical Vulvectomy Bilateral Inginal Femoral Lymph Node Dissection.;  Surgeon: Henrry Granger MD;  Location:  OR       Social History     Tobacco Use     Smoking status: Never Smoker     Smokeless tobacco: Never Used   Substance Use Topics     Alcohol use: Yes     Comment: 1-2xweek     Family History   Problem Relation Age of Onset      Diabetes Maternal Grandmother      Cancer Mother         renal cell carcinoma     Cancer Son         brain cancer/germinoma     Hypertension No family hx of      Cerebrovascular Disease No family hx of      Breast Cancer No family hx of      Cancer - colorectal No family hx of      Prostate Cancer No family hx of      C.A.D. No family hx of          Current Outpatient Medications   Medication Sig Dispense Refill     CALCIUM + D PO 1 daily       fluticasone (FLONASE) 50 MCG/ACT nasal spray Spray 1 spray into both nostrils daily 9 mL 0     MELATONIN PO Take 10 mg by mouth       Multiple Vitamin (DAILY MULTIVITAMIN PO) Take 1 tablet by mouth daily.       ondansetron (ZOFRAN-ODT) 4 MG ODT tab Take 1-2 tablets (4-8 mg) by mouth every 8 hours as needed for nausea (Patient not taking: Reported on 1/13/2021) 4 tablet 0     Allergies   Allergen Reactions     Nickel Rash       ROS:  Constitutional, HEENT, cardiovascular, pulmonary, gi and gu systems are negative, except as otherwise noted.    OBJECTIVE:                                                    /82   Pulse 64   Temp 97  F (36.1  C) (Tympanic)   Resp 16   LMP 03/13/2007  There is no height or weight on file to calculate BMI.   GENERAL: healthy, alert, well nourished, well hydrated, no distress  HENT: ear canals- normal; TMs- normal; Nose- normal; Mouth- no ulcers, no lesions  NECK: no tenderness, no adenopathy, no asymmetry, no masses, no stiffness; thyroid- normal to palpation  RESP: lungs clear to auscultation - no rales, no rhonchi, no wheezes  CV: regular rates and rhythm, normal S1 S2, no S3 or S4 and no murmur, no click or rub -  ABDOMEN: soft, no tenderness, no  hepatosplenomegaly, no masses, normal bowel sounds  Skin: has 2 lesion right sie of face.  Regular border varrigated surface.  1 dry rouch      ASSESSMENT/PLAN:                                                      (B07.8) Other viral warts  (primary encounter diagnosi  Plan: DESTRUCT  BENIGN LESION, UP TO 14   All lesions are frozen with LN2 x3. Patient tolerated procedure well.     ASSESSMENT:  WART     PLAN:  WART CARE DISCUSSED. USE OF OTC PRODUCT STARTING IN FEW DAYS. GENTLE ABRAISION WITH PUMICE STONE OR EMERY BOARD WITH GOOD HANDWASHING AFTER. RETURN IN TWO WEEKS FOR REFREEZING UNTIL RESOLVED.      (L57.0) AK (actinic keratosis)  Comment: recommend recheck in clininc in 4 weeks to make sure it has resolved.  Plan: DESTRUCT BENIGN LESION, UP TO 14            Weight management plan: Discussed healthy diet and exercise guidelines      [facility]

## 2021-04-03 ENCOUNTER — HEALTH MAINTENANCE LETTER (OUTPATIENT)
Age: 66
End: 2021-04-03

## 2021-05-12 ENCOUNTER — VIRTUAL VISIT (OUTPATIENT)
Dept: FAMILY MEDICINE | Facility: CLINIC | Age: 66
End: 2021-05-12
Payer: COMMERCIAL

## 2021-05-12 DIAGNOSIS — B00.1 HERPES LABIALIS: Primary | ICD-10-CM

## 2021-05-12 DIAGNOSIS — Z12.11 SCREENING FOR COLON CANCER: ICD-10-CM

## 2021-05-12 PROCEDURE — 99213 OFFICE O/P EST LOW 20 MIN: CPT | Mod: 95 | Performed by: FAMILY MEDICINE

## 2021-05-12 RX ORDER — VALACYCLOVIR HYDROCHLORIDE 1 G/1
2000 TABLET, FILM COATED ORAL 2 TIMES DAILY
Qty: 8 TABLET | Refills: 3 | Status: SHIPPED | OUTPATIENT
Start: 2021-05-12 | End: 2022-08-17

## 2021-05-12 NOTE — PROGRESS NOTES
Carla is a 66 year old who is being evaluated via a billable video visit.      How would you like to obtain your AVS? MyChart  If the video visit is dropped, the invitation should be resent by: Send to e-mail at: [email or web address]  Will anyone else be joining your video visit? No          Assessment & Plan     Herpes labialis  Has used Valtrex in the past we will refill.  If not resolving please contact us  - valACYclovir (VALTREX) 1000 mg tablet; Take 2 tablets (2,000 mg) by mouth 2 times daily for 1 day Keep extra tabs on hand in case of another outbreak    Prescription drug management         Patient Instructions   I sent in the valcyclovir for you and some to  Have on hand.   You are due for colonoscopy also  I will put in the order for that. Isamar Diallo M.D.        Return in about 6 months (around 11/12/2021) for Physical Exam.    Isamar Diallo MD  [facility]   Carla is a 66 year old who presents for the following health issues     HPI     My Chart Appointment note: I've had a huge outbreak of cold sores on lips and inside my mouth. None of the over the counter are working and need some oral meds, it hurts to eat and drink   with open sores. Can I get some Valacyclovir, Zovirax please? I'm under a lot of  stress with us moving this weekend.  Usually gets one time per year about outbreak she is used valacyclovir in the past with good success she does have some in the mouth this time which is a little unusual for her.  They just sold her house and moved so she is under a lot of stress and I can see that that is probably the trigger for this.  We discussed how to use the medication.  I am also sending an extra for her to have on hand at home so she can start them as soon as needed in case she has more outbreaks I also put on a refill for her she can continue to use topical for relief.          Review of Systems   Constitutional, HEENT, cardiovascular, pulmonary, gi and gu  systems are negative, except as otherwise noted.      Objective           Vitals:  No vitals were obtained today due to virtual visit.    Physical Exam   GENERAL: Healthy, alert and no distress  EYES: Eyes grossly normal to inspection.  No discharge or erythema, or obvious scleral/conjunctival abnormalities.  HENT: normal cephalic/atraumatic and herpes labialis   NEURO: Cranial nerves grossly intact.  Mentation and speech appropriate for age.  PSYCH: Mentation appears normal, affect normal/bright, judgement and insight intact, normal speech and appearance well-groomed.                Video-Visit Details    Type of service:telephone total time 6 minutes    Video End Time:9:39 AM    Originating Location (pt. Location): Home    Distant Location (provider location):  [facility]     Platform used for Video Visit: Unable to complete video visit

## 2021-05-12 NOTE — PATIENT INSTRUCTIONS
I sent in the valcyclovir for you and some to  Have on hand.   You are due for colonoscopy also  I will put in the order for that. Isamar Diallo M.D.

## 2021-07-11 DIAGNOSIS — Z11.59 ENCOUNTER FOR SCREENING FOR OTHER VIRAL DISEASES: ICD-10-CM

## 2021-09-12 ENCOUNTER — HEALTH MAINTENANCE LETTER (OUTPATIENT)
Age: 66
End: 2021-09-12

## 2021-09-13 ENCOUNTER — LAB (OUTPATIENT)
Dept: LAB | Facility: CLINIC | Age: 66
End: 2021-09-13
Payer: COMMERCIAL

## 2021-09-13 DIAGNOSIS — Z11.59 ENCOUNTER FOR SCREENING FOR OTHER VIRAL DISEASES: ICD-10-CM

## 2021-09-13 PROCEDURE — U0005 INFEC AGEN DETEC AMPLI PROBE: HCPCS

## 2021-09-13 PROCEDURE — U0003 INFECTIOUS AGENT DETECTION BY NUCLEIC ACID (DNA OR RNA); SEVERE ACUTE RESPIRATORY SYNDROME CORONAVIRUS 2 (SARS-COV-2) (CORONAVIRUS DISEASE [COVID-19]), AMPLIFIED PROBE TECHNIQUE, MAKING USE OF HIGH THROUGHPUT TECHNOLOGIES AS DESCRIBED BY CMS-2020-01-R: HCPCS

## 2021-09-14 LAB — SARS-COV-2 RNA RESP QL NAA+PROBE: NEGATIVE

## 2021-09-15 ENCOUNTER — ANESTHESIA EVENT (OUTPATIENT)
Dept: GASTROENTEROLOGY | Facility: CLINIC | Age: 66
End: 2021-09-15
Payer: MEDICARE

## 2021-09-15 NOTE — ANESTHESIA PREPROCEDURE EVALUATION
Anesthesia Pre-Procedure Evaluation    Patient: Carla Hong   MRN: [id number] : 1955        Preoperative Diagnosis: Screen for colon cancer [Z12.11]   Procedure : Procedure(s):  COLONOSCOPY     Past Medical History:   Diagnosis Date     Vulvar cancer (H)       Past Surgical History:   Procedure Laterality Date     BUNIONECTOMY Right 2018    Procedure: 1.  Lapidus bunionectomy right foot  2. hammertoe correction third toe right foot;  Surgeon: Saqib Interiano DPM;  Location: [facility]     COLONOSCOPY  2010    COLONOSCOPY performed by RYAN OSEGUERA at [facility]     MAMMOPLASTY REDUCTION BILATERAL Bilateral 2020    Procedure: MAMMOPLASTY, REDUCTION, BILATERAL;  Surgeon: MOLLY Kang MD;  Location: [facility] [place]     VULVECTOMY RADICAL DISSECT GROIN(S)  2012    Procedure: VULVECTOMY RADICAL DISSECT GROIN(S);  Radical Vulvectomy Bilateral Inginal Femoral Lymph Node Dissection.;  Surgeon: Henrry Granger MD;  Location: [facility] OR      Allergies   Allergen Reactions     Nickel Rash      Social History     Tobacco Use     Smoking status: Never Smoker     Smokeless tobacco: Never Used   Substance Use Topics     Alcohol use: Yes     Comment: 1-2xweek      Wt Readings from Last 1 Encounters:   20 68.5 kg (151 lb)        Anesthesia Evaluation   Pt has had prior anesthetic.     No history of anesthetic complications       ROS/MED HX  ENT/Pulmonary:  - neg pulmonary ROS     Neurologic:  - neg neurologic ROS     Cardiovascular:  - neg cardiovascular ROS     METS/Exercise Tolerance:     Hematologic:  - neg hematologic  ROS     Musculoskeletal:  - neg musculoskeletal ROS     GI/Hepatic:  - neg GI/hepatic ROS     Renal/Genitourinary:  - neg Renal ROS     Endo:  - neg endo ROS     Psychiatric/Substance Use:     (+) psychiatric history other (comment)     Infectious Disease:  - neg infectious disease ROS     Malignancy:   (+) Malignancy, History of Other.    Other:  - neg other ROS           Physical Exam    Airway  airway exam normal      Mallampati: II   TM distance: > 3 FB   Neck ROM: full   Mouth opening: > 3 cm    Respiratory Devices and Support         Dental  no notable dental history         Cardiovascular   cardiovascular exam normal          Pulmonary   pulmonary exam normal                OUTSIDE LABS:  CBC:   Lab Results   Component Value Date    WBC 5.3 03/15/2019    WBC 8.0 11/26/2018    HGB 12.9 03/15/2019    HGB 13.2 11/26/2018    HCT 38.8 03/15/2019    HCT 39.6 11/26/2018     03/15/2019     11/26/2018     BMP:   Lab Results   Component Value Date     11/25/2020     03/15/2019    POTASSIUM 4.1 11/25/2020    POTASSIUM 4.0 03/15/2019    CHLORIDE 99 11/25/2020    CHLORIDE 102 03/15/2019    CO2 28 11/25/2020    CO2 26 03/15/2019    BUN 11 11/25/2020    BUN 8 03/15/2019    CR 0.79 11/25/2020    CR 0.62 03/15/2019    GLC 86 11/25/2020    GLC 76 03/15/2019     COAGS:   Lab Results   Component Value Date    INR 1.10 01/19/2013     POC: No results found for: BGM, HCG, HCGS  HEPATIC: No results found for: ALBUMIN, PROTTOTAL, ALT, AST, GGT, ALKPHOS, BILITOTAL, BILIDIRECT, DM  OTHER:   Lab Results   Component Value Date    ONDINA 9.6 11/25/2020    TSH 0.82 03/15/2019       Anesthesia Plan    ASA Status:  2   NPO Status:  NPO Appropriate    Anesthesia Type: General.     - Airway: Native airway   Induction: Intravenous.   Maintenance: TIVA.        Consents    Anesthesia Plan(s) and associated risks, benefits, and realistic alternatives discussed. Questions answered and patient/representative(s) expressed understanding.     - Discussed with:  Patient         Postoperative Care            Comments:                TRUDY Ashby CRNA

## 2021-09-16 ENCOUNTER — HOSPITAL ENCOUNTER (OUTPATIENT)
Facility: CLINIC | Age: 66
Discharge: HOME | End: 2021-09-16
Attending: SURGERY | Admitting: SURGERY
Payer: MEDICARE

## 2021-09-16 ENCOUNTER — ANESTHESIA (OUTPATIENT)
Dept: GASTROENTEROLOGY | Facility: CLINIC | Age: 66
End: 2021-09-16
Payer: MEDICARE

## 2021-09-16 VITALS
HEART RATE: 66 BPM | OXYGEN SATURATION: 97 % | SYSTOLIC BLOOD PRESSURE: 113 MMHG | DIASTOLIC BLOOD PRESSURE: 76 MMHG | TEMPERATURE: 97.7 F | BODY MASS INDEX: 22.88 KG/M2 | HEIGHT: 68 IN | WEIGHT: 151 LBS

## 2021-09-16 LAB — COLONOSCOPY: NORMAL

## 2021-09-16 PROCEDURE — G0121 COLON CA SCRN NOT HI RSK IND: HCPCS | Performed by: SURGERY

## 2021-09-16 PROCEDURE — 250N000011 HC RX IP 250 OP 636: Performed by: NURSE ANESTHETIST, CERTIFIED REGISTERED

## 2021-09-16 PROCEDURE — 45378 DIAGNOSTIC COLONOSCOPY: CPT | Performed by: SURGERY

## 2021-09-16 PROCEDURE — 250N000009 HC RX 250: Performed by: NURSE ANESTHETIST, CERTIFIED REGISTERED

## 2021-09-16 PROCEDURE — 258N000003 HC RX IP 258 OP 636: Performed by: SURGERY

## 2021-09-16 PROCEDURE — 370N000017 HC ANESTHESIA TECHNICAL FEE, PER MIN: Performed by: SURGERY

## 2021-09-16 RX ORDER — GLYCOPYRROLATE 0.2 MG/ML
INJECTION, SOLUTION INTRAMUSCULAR; INTRAVENOUS PRN
Status: DISCONTINUED | OUTPATIENT
Start: 2021-09-16 | End: 2021-09-16

## 2021-09-16 RX ORDER — PROPOFOL 10 MG/ML
INJECTION, EMULSION INTRAVENOUS CONTINUOUS PRN
Status: DISCONTINUED | OUTPATIENT
Start: 2021-09-16 | End: 2021-09-16

## 2021-09-16 RX ORDER — PROPOFOL 10 MG/ML
INJECTION, EMULSION INTRAVENOUS PRN
Status: DISCONTINUED | OUTPATIENT
Start: 2021-09-16 | End: 2021-09-16

## 2021-09-16 RX ORDER — ONDANSETRON 2 MG/ML
4 INJECTION INTRAMUSCULAR; INTRAVENOUS
Status: DISCONTINUED | OUTPATIENT
Start: 2021-09-16 | End: 2021-09-16 | Stop reason: HOSPADM

## 2021-09-16 RX ORDER — LIDOCAINE 40 MG/G
CREAM TOPICAL
Status: DISCONTINUED | OUTPATIENT
Start: 2021-09-16 | End: 2021-09-16 | Stop reason: HOSPADM

## 2021-09-16 RX ORDER — SODIUM CHLORIDE, SODIUM LACTATE, POTASSIUM CHLORIDE, CALCIUM CHLORIDE 600; 310; 30; 20 MG/100ML; MG/100ML; MG/100ML; MG/100ML
INJECTION, SOLUTION INTRAVENOUS CONTINUOUS
Status: DISCONTINUED | OUTPATIENT
Start: 2021-09-16 | End: 2021-09-16 | Stop reason: HOSPADM

## 2021-09-16 RX ORDER — LIDOCAINE HYDROCHLORIDE 10 MG/ML
INJECTION, SOLUTION EPIDURAL; INFILTRATION; INTRACAUDAL; PERINEURAL PRN
Status: DISCONTINUED | OUTPATIENT
Start: 2021-09-16 | End: 2021-09-16

## 2021-09-16 RX ADMIN — LIDOCAINE HYDROCHLORIDE 50 MG: 10 INJECTION, SOLUTION EPIDURAL; INFILTRATION; INTRACAUDAL; PERINEURAL at 09:31

## 2021-09-16 RX ADMIN — SODIUM CHLORIDE, POTASSIUM CHLORIDE, SODIUM LACTATE AND CALCIUM CHLORIDE: 600; 310; 30; 20 INJECTION, SOLUTION INTRAVENOUS at 08:37

## 2021-09-16 RX ADMIN — GLYCOPYRROLATE 0.2 MG: 0.2 INJECTION, SOLUTION INTRAMUSCULAR; INTRAVENOUS at 09:31

## 2021-09-16 RX ADMIN — PROPOFOL 100 MG: 10 INJECTION, EMULSION INTRAVENOUS at 09:31

## 2021-09-16 RX ADMIN — LIDOCAINE HYDROCHLORIDE 0.1 ML: 10 INJECTION, SOLUTION EPIDURAL; INFILTRATION; INTRACAUDAL; PERINEURAL at 08:36

## 2021-09-16 RX ADMIN — PROPOFOL 200 MCG/KG/MIN: 10 INJECTION, EMULSION INTRAVENOUS at 09:31

## 2021-09-16 ASSESSMENT — MIFFLIN-ST. JEOR: SCORE: 1284.78

## 2021-09-16 NOTE — H&P
"66 year old year old female here for colonoscopy for screening.    Patient Active Problem List   Diagnosis     Chronic rhinitis     Dysphonia     Other diseases of vocal cords     Adjustment disorder with depressed mood     Vulvar lump     Postmenopausal bleeding     Vulvar cancer (H)     Cellulitis of groin, left     Recurrent cellulitis of lower extremity     Ptosis of breast     History of cancer of vulva     Breast hypertrophy       Past Medical History:   Diagnosis Date     Vulvar cancer (H) 11/12       Past Surgical History:   Procedure Laterality Date     BUNIONECTOMY Right 12/18/2018    Procedure: 1.  Lapidus bunionectomy right foot  2. hammertoe correction third toe right foot;  Surgeon: Saqib Interiano DPM;  Location: [facility]     COLONOSCOPY  12/6/2010    COLONOSCOPY performed by RYAN OSEGUERA at [facility]     MAMMOPLASTY REDUCTION BILATERAL Bilateral 12/2/2020    Procedure: MAMMOPLASTY, REDUCTION, BILATERAL;  Surgeon: MOLLY Kang MD;  Location: [facility] OR     VULVECTOMY RADICAL DISSECT GROIN(S)  11/16/2012    Procedure: VULVECTOMY RADICAL DISSECT GROIN(S);  Radical Vulvectomy Bilateral Inginal Femoral Lymph Node Dissection.;  Surgeon: Henrry Granger MD;  Location:  OR       @[facility]@     current outpatient medications on file.       Allergies   Allergen Reactions     Nickel Rash       Pt reports that she has never smoked. She has never used smokeless tobacco. She reports current alcohol use. She reports that she does not use drugs.    Exam:  /78 (BP Location: Left arm)   Pulse 65   Temp 97.7  F (36.5  C) (Oral)   Ht 1.737 m (5' 8.4\")   Wt 68.5 kg (151 lb)   LMP 03/13/2007   SpO2 100%   BMI 22.69 kg/m      Awake, Alert OX3  Lungs - CTA bilaterally  CV - RRR, no murmurs, distal pulses intact  Abd - soft, non-distended, non-tender, +BS  Extr - No cyanosis or edema    A/P 66 year old year old female in need of colonoscopy for screening. Risks, benefits, alternatives, and " complications were discussed including the possibility of perforation and the patient agreed to proceed    Singh Horta MD

## 2021-09-16 NOTE — ANESTHESIA POSTPROCEDURE EVALUATION
Patient: Carla Hong    Procedure(s):  COLONOSCOPY    Diagnosis:Screen for colon cancer [Z12.11]  Diagnosis Additional Information: No value filed.    Anesthesia Type:  General    Note:  Disposition: Outpatient   Postop Pain Control: Uneventful            Sign Out: Well controlled pain   PONV: No   Neuro/Psych: Uneventful            Sign Out: Acceptable/Baseline neuro status   Airway/Respiratory: Uneventful            Sign Out: Acceptable/Baseline resp. status   CV/Hemodynamics: Uneventful            Sign Out: Acceptable CV status; No obvious hypovolemia; No obvious fluid overload   Other NRE: NONE   DID A NON-ROUTINE EVENT OCCUR? No           Last vitals:  Vitals Value Taken Time   BP 97/62 09/16/21 0950   Temp     Pulse 68 09/16/21 0950   Resp     SpO2 95 % 09/16/21 0954   Vitals shown include unvalidated device data.    Electronically Signed By: TRUDY Hopkins CRNA  September 16, 2021  9:55 AM

## 2021-09-16 NOTE — ANESTHESIA CARE TRANSFER NOTE
Patient: Carla Hong    Procedure(s):  COLONOSCOPY    Diagnosis: Screen for colon cancer [Z12.11]  Diagnosis Additional Information: No value filed.    Anesthesia Type:   General     Note:    Oropharynx: spontaneously breathing  Level of Consciousness: drowsy  Oxygen Supplementation: room air    Independent Airway: airway patency satisfactory and stable  Dentition: dentition unchanged  Vital Signs Stable: post-procedure vital signs reviewed and stable  Report to RN Given: handoff report given  Patient transferred to: Phase II    Handoff Report: Identifed the Patient, Identified the Reponsible Provider, Reviewed the pertinent medical history, Discussed the surgical course, Reviewed Intra-OP anesthesia mangement and issues during anesthesia, Set expectations for post-procedure period and Allowed opportunity for questions and acknowledgement of understanding      Vitals:  Vitals Value Taken Time   BP 97/62 09/16/21 0950   Temp     Pulse 68 09/16/21 0950   Resp     SpO2 95 % 09/16/21 0954   Vitals shown include unvalidated device data.    Electronically Signed By: TRUDY Hopkins CRNA  September 16, 2021  9:55 AM

## 2021-09-22 ENCOUNTER — HOSPITAL ENCOUNTER (OUTPATIENT)
Dept: MAMMOGRAPHY | Facility: CLINIC | Age: 66
Discharge: HOME | End: 2021-09-22
Attending: FAMILY MEDICINE | Admitting: FAMILY MEDICINE
Payer: MEDICARE

## 2021-09-22 DIAGNOSIS — Z12.31 VISIT FOR SCREENING MAMMOGRAM: ICD-10-CM

## 2021-09-22 PROCEDURE — 77063 BREAST TOMOSYNTHESIS BI: CPT

## 2021-10-04 ENCOUNTER — ANCILLARY PROCEDURE (OUTPATIENT)
Dept: MAMMOGRAPHY | Facility: CLINIC | Age: 66
End: 2021-10-04
Attending: FAMILY MEDICINE
Payer: COMMERCIAL

## 2021-10-04 DIAGNOSIS — R92.8 ABNORMAL MAMMOGRAM: ICD-10-CM

## 2021-10-04 PROCEDURE — 77061 BREAST TOMOSYNTHESIS UNI: CPT | Mod: RT | Performed by: RADIOLOGY

## 2021-10-04 PROCEDURE — 77065 DX MAMMO INCL CAD UNI: CPT | Mod: RT | Performed by: RADIOLOGY

## 2021-10-04 PROCEDURE — 76642 ULTRASOUND BREAST LIMITED: CPT | Mod: RT | Performed by: RADIOLOGY

## 2021-10-07 ENCOUNTER — MYC MEDICAL ADVICE (OUTPATIENT)
Dept: FAMILY MEDICINE | Facility: CLINIC | Age: 66
End: 2021-10-07

## 2021-10-08 ENCOUNTER — OFFICE VISIT (OUTPATIENT)
Dept: FAMILY MEDICINE | Facility: CLINIC | Age: 66
End: 2021-10-08
Payer: COMMERCIAL

## 2021-10-08 VITALS
OXYGEN SATURATION: 98 % | WEIGHT: 140 LBS | HEART RATE: 86 BPM | HEIGHT: 68 IN | SYSTOLIC BLOOD PRESSURE: 136 MMHG | TEMPERATURE: 98.5 F | RESPIRATION RATE: 16 BRPM | DIASTOLIC BLOOD PRESSURE: 80 MMHG | BODY MASS INDEX: 21.22 KG/M2

## 2021-10-08 DIAGNOSIS — J01.00 ACUTE NON-RECURRENT MAXILLARY SINUSITIS: Primary | ICD-10-CM

## 2021-10-08 DIAGNOSIS — L82.0 INFLAMED SEBORRHEIC KERATOSIS: ICD-10-CM

## 2021-10-08 DIAGNOSIS — C51.9 VULVAR CANCER (H): ICD-10-CM

## 2021-10-08 PROCEDURE — 99214 OFFICE O/P EST MOD 30 MIN: CPT | Mod: 25 | Performed by: FAMILY MEDICINE

## 2021-10-08 PROCEDURE — 17000 DESTRUCT PREMALG LESION: CPT | Performed by: FAMILY MEDICINE

## 2021-10-08 ASSESSMENT — MIFFLIN-ST. JEOR: SCORE: 1234.89

## 2021-10-08 NOTE — PATIENT INSTRUCTIONS
Antibiotic for sinus infection     Froze lesion on the face   This should heal and peel off in 1 week

## 2021-10-08 NOTE — PROGRESS NOTES
"    Assessment & Plan     Acute non-recurrent maxillary sinusitis    - amoxicillin-clavulanate (AUGMENTIN) 875-125 MG tablet; Take 1 tablet by mouth 2 times daily    Inflamed seborrheic keratosis  Retreated with cryo   - DESTRUCT BENIGN LESION, UP TO 14      Vulvar cancer   Reviewed Onc notes and surveillance should be yearly        Patient Instructions   Antibiotic for sinus infection     Froze lesion on the face   This should heal and peel off in 1 week       Return in about 2 weeks (around 10/22/2021), or if symptoms worsen or fail to improve.    Tiesha Null MD  [facility]    Surya Aguirre is a 66 year old who presents for the following health issues     HPI     Concern - Derm  Onset: last year  Description: possible mole on right side of hairline on forehead, growing in size, itching   Previous history of similar problem: yes had it froze off a few years ago, pt stated it was biopsied which was negative       Acute Illness  Acute illness concerns: 1 week   Onset/Duration:   Symptoms:  Fever: no  Chills/Sweats: no  Headache (location?): no  Sinus Pressure: YES  Conjunctivitis:  no  Ear Pain: no  Rhinorrhea: YES  Congestion: YES  Sore Throat: YES  Cough: YES  Wheeze: no  Decreased Appetite: no  Nausea: no  Vomiting: no  Diarrhea: no  Dysuria/Freq.: no  Dysuria or Hematuria: no  Fatigue/Achiness: no  Sick/Strep Exposure: no  Therapies tried and outcome: None    Review of Systems   Constitutional, HEENT, cardiovascular, pulmonary, gi and gu systems are negative, except as otherwise noted.      Objective    /80   Pulse 86   Temp 98.5  F (36.9  C) (Tympanic)   Resp 16   Ht 1.737 m (5' 8.4\")   Wt 63.5 kg (140 lb)   LMP 03/13/2007   SpO2 98%   BMI 21.04 kg/m    Body mass index is 21.04 kg/m .  Physical Exam   GENERAL APPEARANCE: healthy, alert and no distress  HENT: ear canals and TM's normal and nose and mouth without ulcers or lesions  NECK: no adenopathy, no " asymmetry, masses, or scars and thyroid normal to palpation  RESP: lungs clear to auscultation - no rales, rhonchi or wheezes  CV: regular rates and rhythm, normal S1 S2, no S3 or S4 and no murmur, click or rub  SKIN: 2 cm raised seb K right temple area   Treated with cryo in freeze thaw x2  PSYCH: mentation appears normal and affect normal/bright

## 2021-11-01 ENCOUNTER — IMMUNIZATION (OUTPATIENT)
Dept: FAMILY MEDICINE | Facility: CLINIC | Age: 66
End: 2021-11-01
Payer: COMMERCIAL

## 2021-11-01 DIAGNOSIS — Z23 HIGH PRIORITY FOR 2019-NCOV VACCINE: ICD-10-CM

## 2021-11-01 DIAGNOSIS — Z23 NEED FOR PROPHYLACTIC VACCINATION AND INOCULATION AGAINST INFLUENZA: Primary | ICD-10-CM

## 2021-11-01 PROCEDURE — 91301 COVID-19,PF,MODERNA (18+ YRS BOOSTER .25ML): CPT

## 2021-11-01 PROCEDURE — 90662 IIV NO PRSV INCREASED AG IM: CPT

## 2021-11-01 PROCEDURE — G0008 ADMIN INFLUENZA VIRUS VAC: HCPCS

## 2021-11-01 PROCEDURE — 0064A COVID-19,PF,MODERNA (18+ YRS BOOSTER .25ML): CPT

## 2022-02-01 ENCOUNTER — MYC MEDICAL ADVICE (OUTPATIENT)
Dept: FAMILY MEDICINE | Facility: CLINIC | Age: 67
End: 2022-02-01
Payer: COMMERCIAL

## 2022-02-02 ENCOUNTER — VIRTUAL VISIT (OUTPATIENT)
Dept: FAMILY MEDICINE | Facility: CLINIC | Age: 67
End: 2022-02-02
Payer: COMMERCIAL

## 2022-02-02 DIAGNOSIS — J01.00 ACUTE NON-RECURRENT MAXILLARY SINUSITIS: Primary | ICD-10-CM

## 2022-02-02 PROCEDURE — 99213 OFFICE O/P EST LOW 20 MIN: CPT | Mod: TEL | Performed by: FAMILY MEDICINE

## 2022-02-02 NOTE — PROGRESS NOTES
Carla is a 66 year old who is being evaluated via a billable telephone visit.      What phone number would you like to be contacted at? [phone]  How would you like to obtain your AVS? MyChart    Assessment & Plan     Acute non-recurrent maxillary sinusitis  - amoxicillin-clavulanate (AUGMENTIN) 875-125 MG tablet; Take 1 tablet by mouth 2 times daily                 Return in about 1 week (around 2/9/2022), or if symptoms worsen or fail to improve.    Tiesha Null MD  [facility]   Carla is a 66 year old who presents for the following health issues     HPI     Acute Illness  Acute illness concerns: headache, green mucous, blowing nose, teeth started aching yesterday  Onset/Duration: 10 days  Symptoms:  Fever: no  Chills/Sweats: no  Headache (location?): YES  Sinus Pressure: YES  Conjunctivitis:  no  Ear Pain: no  Rhinorrhea: YES  Congestion: YES  Sore Throat: no  Cough: no  Wheeze: no  Decreased Appetite: YES  Nausea: no  Vomiting: no  Diarrhea: no  Dysuria/Freq.: no  Dysuria or Hematuria: no  Fatigue/Achiness: YES  Sick/Strep Exposure: no  Therapies tried and outcome: hot showers, emergen c, advil cold and sinus          Review of Systems   Constitutional, HEENT, cardiovascular, pulmonary, gi and gu systems are negative, except as otherwise noted.      Objective           Vitals:  No vitals were obtained today due to virtual visit.    Physical Exam   healthy, alert and no distress  PSYCH: Alert and oriented times 3; coherent speech, normal   rate and volume, able to articulate logical thoughts, able   to abstract reason, no tangential thoughts, no hallucinations   or delusions  Her affect is normal  RESP: No cough, no audible wheezing, able to talk in full sentences  Remainder of exam unable to be completed due to telephone visits                Phone call duration: 15 minutes

## 2022-04-24 ENCOUNTER — HEALTH MAINTENANCE LETTER (OUTPATIENT)
Age: 67
End: 2022-04-24

## 2022-04-26 ENCOUNTER — MYC MEDICAL ADVICE (OUTPATIENT)
Dept: FAMILY MEDICINE | Facility: CLINIC | Age: 67
End: 2022-04-26
Payer: COMMERCIAL

## 2022-06-06 ENCOUNTER — TELEPHONE (OUTPATIENT)
Dept: FAMILY MEDICINE | Facility: CLINIC | Age: 67
End: 2022-06-06
Payer: COMMERCIAL

## 2022-06-06 NOTE — TELEPHONE ENCOUNTER
Carla is calling for same day appt. and will see anyone for sinus issues and pink eye.    Contact carla at [phone] anytime

## 2022-06-07 ENCOUNTER — VIRTUAL VISIT (OUTPATIENT)
Dept: FAMILY MEDICINE | Facility: CLINIC | Age: 67
End: 2022-06-07
Payer: COMMERCIAL

## 2022-06-07 DIAGNOSIS — J01.90 ACUTE SINUSITIS WITH SYMPTOMS > 10 DAYS: ICD-10-CM

## 2022-06-07 DIAGNOSIS — L40.9 PSORIASIS: Primary | ICD-10-CM

## 2022-06-07 PROCEDURE — 99214 OFFICE O/P EST MOD 30 MIN: CPT | Mod: 95 | Performed by: NURSE PRACTITIONER

## 2022-06-07 RX ORDER — CLOBETASOL PROPIONATE 0.5 MG/ML
SOLUTION TOPICAL 2 TIMES DAILY
Qty: 50 ML | Refills: 1 | Status: SHIPPED | OUTPATIENT
Start: 2022-06-07 | End: 2022-08-17

## 2022-06-07 NOTE — PROGRESS NOTES
Carla is a 67 year old who is being evaluated via a billable video visit.      How would you like to obtain your AVS? MyChart  If the video visit is dropped, the invitation should be resent by: Send to e-mail at: [email or web address]  Will anyone else be joining your video visit? No      Video Start Time: 715    Assessment & Plan     Psoriasis  Trial new medication  - clobetasol (TEMOVATE) 0.05 % external solution  Dispense: 50 mL; Refill: 1    Acute sinusitis with symptoms > 10 days  Symptomatic care strategies reviewed.   Begin   - amoxicillin-clavulanate (AUGMENTIN) 875-125 MG tablet  Dispense: 20 tablet; Refill: 0    Call or return to the clinic with any worsening of symptoms or no resolution. Patient/Parent verbalized understanding and is in agreement. Medication side effects reviewed.   Current Outpatient Medications   Medication Sig Dispense Refill     amoxicillin-clavulanate (AUGMENTIN) 875-125 MG tablet Take 1 tablet by mouth 2 times daily for 10 days 20 tablet 0     CALCIUM + D PO 1 daily       clobetasol (TEMOVATE) 0.05 % external solution Apply topically 2 times daily 50 mL 1     fluticasone (FLONASE) 50 MCG/ACT nasal spray Spray 1 spray into both nostrils daily 9 mL 0     MELATONIN PO Take 10 mg by mouth        Multiple Vitamin (DAILY MULTIVITAMIN PO) Take 1 tablet by mouth daily.       amoxicillin-clavulanate (AUGMENTIN) 875-125 MG tablet Take 1 tablet by mouth 2 times daily (Patient not taking: Reported on 6/7/2022) 20 tablet 0     amoxicillin-clavulanate (AUGMENTIN) 875-125 MG tablet Take 1 tablet by mouth 2 times daily (Patient not taking: No sig reported) 20 tablet 0     valACYclovir (VALTREX) 1000 mg tablet Take 2 tablets (2,000 mg) by mouth 2 times daily for 1 day Keep extra tabs on hand in case of another outbreak 8 tablet 3     Chart documentation with Dragon Voice recognition Software. Although reviewed after completion, some words and grammatical errors may remain.  As the provider for this  telephone/video service, I attest that I introduced myself to the patient, provided my credentials, disclosed my location, and determined that, based on a review of the patient's chart and/or a discussion with members of the patient's treatment team, a telephone/video visit is an appropriate and effective means of providing this service. The patient and I mutually agree that this visit is appropriate for telephone/video visit as well.    TRUDY Marte [facility]    Surya Aguirre is a 67 year old who presents for the following health issues     Sinus Problem     Conjunctivitis         Pt presents today for a sinus infection 7-8 days ago, with facial pressure and congestions. She also wakes up with a left matted eye, the eye has a pink tint to it. 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 She has had this for 2 months   covid tested x 3  Caring for grandchildren who have been sick  Recently returned from [place]  Scalp break out .. previous psoriatic lesion left eye lid  Nothing OTC has been clearing it up.   Eye drainage left since sinus infection started  Upper teeth hurt        Review of Systems   Constitutional, HEENT, cardiovascular, pulmonary, GI, , musculoskeletal, neuro, skin, endocrine and psych systems are negative, except as otherwise noted.      Objective           Vitals:  No vitals were obtained today due to virtual visit.    Physical Exam   GENERAL: Healthy, alert and no distress  EYES: Eyes grossly normal to inspection.  No discharge or erythema, or obvious scleral/conjunctival abnormalities.  RESP: No audible wheeze, cough, or visible cyanosis.  No visible retractions or increased work of breathing.    SKIN: Visible skin clear. No significant rash, abnormal pigmentation or lesions.  NEURO: Cranial nerves grossly intact.  Mentation and speech appropriate for age.  PSYCH: Mentation appears normal,  affect normal/bright, judgement and insight intact, normal speech and appearance well-groomed.    Admission on 09/16/2021, Discharged on 09/16/2021   Component Date Value Ref Range Status     COLONOSCOPY 09/16/2021    Final                    Value:_______________________________________________________________________________  Patient Name: Carla Hong        Procedure Date: 9/16/2021 9:21 AM  MRN: [id number]                       YOB: 1955  Admit Type: Outpatient                Age: 66  Gender: Female                        Attending MD: Singh Horta MD  Total Sedation Time:                    _______________________________________________________________________________     Procedure:           Colonoscopy  Indications:         Screening for colorectal malignant neoplasm  Providers:           Singh Horta MD, Grisel Carty RN  Referring MD:        Isamar Diallo MD  Medicines:           Monitored Anesthesia Care  Complications:       No immediate complications.  _______________________________________________________________________________  Procedure:           After obtaining informed consent, the colonoscope was                        passed under direct vision. Throughout the procedure,                                                  the patient's blood pressure, pulse, and oxygen                        saturations were monitored continuously. The Colonoscope                        was introduced through the anus and advanced to the                        cecum, identified by appendiceal orifice and ileocecal                        valve. The colonoscopy was performed without difficulty.                        The patient tolerated the procedure well. The quality of                        the bowel preparation was good.                                                                                   Findings:       The perianal and digital rectal examinations were normal.        The colon (entire examined portion) appeared normal.       There is no endoscopic evidence of diverticula, mass or polyps in the        entire colon.       The retroflexed view of the distal rectum and anal verge was normal and        showed no anal or rectal abnormalities.                                                                                                             Impression:          - The entire examined colon is normal.                       - The distal rectum and anal verge are normal on                        retroflexion view.                       - No specimens collected.  Recommendation:      - Discharge patient to home.                       - High fiber diet.                       - Repeat colonoscopy in 10 years for screening purposes.                                                                                     Signed electronically by Singh Horta M.D.  _________________  Singh Horta MD  9/16/2021 9:48:57 AM  I was physically present for the entire viewing portion of the exam.  [id number] Horta MD  Number of Addenda: 0    Note Initiated On: 9/16/2021 9:21 AM  Scope In: 9:33:25 AM  Scope Out: 9:45:20 AM                   Video-Visit Details    Type of service:  Video Visit    Video End Time:722    Originating Location (pt. Location): Home    Distant Location (provider location):  [facility]     Platform used for Video Visit: Trish[employer]

## 2022-08-17 ENCOUNTER — OFFICE VISIT (OUTPATIENT)
Dept: FAMILY MEDICINE | Facility: CLINIC | Age: 67
End: 2022-08-17
Payer: COMMERCIAL

## 2022-08-17 VITALS
OXYGEN SATURATION: 97 % | SYSTOLIC BLOOD PRESSURE: 110 MMHG | DIASTOLIC BLOOD PRESSURE: 72 MMHG | RESPIRATION RATE: 12 BRPM | BODY MASS INDEX: 22.44 KG/M2 | HEIGHT: 67 IN | HEART RATE: 67 BPM | WEIGHT: 143 LBS | TEMPERATURE: 97.2 F

## 2022-08-17 DIAGNOSIS — Z13.220 SCREENING FOR HYPERLIPIDEMIA: ICD-10-CM

## 2022-08-17 DIAGNOSIS — B00.1 HERPES LABIALIS: ICD-10-CM

## 2022-08-17 DIAGNOSIS — Z11.59 NEED FOR HEPATITIS C SCREENING TEST: ICD-10-CM

## 2022-08-17 DIAGNOSIS — Z00.00 ENCOUNTER FOR MEDICARE ANNUAL WELLNESS EXAM: Primary | ICD-10-CM

## 2022-08-17 LAB
ALBUMIN SERPL-MCNC: 4.4 G/DL (ref 3.4–5)
ALP SERPL-CCNC: 69 U/L (ref 40–150)
ALT SERPL W P-5'-P-CCNC: 21 U/L (ref 0–50)
ANION GAP SERPL CALCULATED.3IONS-SCNC: 6 MMOL/L (ref 3–14)
AST SERPL W P-5'-P-CCNC: 19 U/L (ref 0–45)
BILIRUB SERPL-MCNC: 0.5 MG/DL (ref 0.2–1.3)
BUN SERPL-MCNC: 7 MG/DL (ref 7–30)
CALCIUM SERPL-MCNC: 9.2 MG/DL (ref 8.5–10.1)
CHLORIDE BLD-SCNC: 103 MMOL/L (ref 94–109)
CHOLEST SERPL-MCNC: 228 MG/DL
CO2 SERPL-SCNC: 28 MMOL/L (ref 20–32)
CREAT SERPL-MCNC: 0.64 MG/DL (ref 0.52–1.04)
FASTING STATUS PATIENT QL REPORTED: NO
GFR SERPL CREATININE-BSD FRML MDRD: >90 ML/MIN/1.73M2
GLUCOSE BLD-MCNC: 89 MG/DL (ref 70–99)
HDLC SERPL-MCNC: 75 MG/DL
LDLC SERPL CALC-MCNC: 132 MG/DL
NONHDLC SERPL-MCNC: 153 MG/DL
POTASSIUM BLD-SCNC: 4.1 MMOL/L (ref 3.4–5.3)
PROT SERPL-MCNC: 7.4 G/DL (ref 6.8–8.8)
SODIUM SERPL-SCNC: 137 MMOL/L (ref 133–144)
TRIGL SERPL-MCNC: 103 MG/DL

## 2022-08-17 PROCEDURE — 90677 PCV20 VACCINE IM: CPT | Performed by: FAMILY MEDICINE

## 2022-08-17 PROCEDURE — 80061 LIPID PANEL: CPT | Performed by: FAMILY MEDICINE

## 2022-08-17 PROCEDURE — G0009 ADMIN PNEUMOCOCCAL VACCINE: HCPCS | Performed by: FAMILY MEDICINE

## 2022-08-17 PROCEDURE — 80053 COMPREHEN METABOLIC PANEL: CPT | Performed by: FAMILY MEDICINE

## 2022-08-17 PROCEDURE — G0438 PPPS, INITIAL VISIT: HCPCS | Performed by: FAMILY MEDICINE

## 2022-08-17 PROCEDURE — 36415 COLL VENOUS BLD VENIPUNCTURE: CPT | Performed by: FAMILY MEDICINE

## 2022-08-17 RX ORDER — VALACYCLOVIR HYDROCHLORIDE 1 G/1
2000 TABLET, FILM COATED ORAL 2 TIMES DAILY
Qty: 8 TABLET | Refills: 4 | Status: SHIPPED | OUTPATIENT
Start: 2022-08-17 | End: 2023-01-09

## 2022-08-17 ASSESSMENT — ENCOUNTER SYMPTOMS
EYE PAIN: 0
SORE THROAT: 0
CHILLS: 0
MYALGIAS: 0
PARESTHESIAS: 0
BREAST MASS: 0
PALPITATIONS: 0
NAUSEA: 0
ARTHRALGIAS: 0
COUGH: 0
NERVOUS/ANXIOUS: 0
DIARRHEA: 0
HEADACHES: 1
FEVER: 0
FREQUENCY: 0
WEAKNESS: 0
DIZZINESS: 0
ABDOMINAL PAIN: 0
HEMATURIA: 0
DYSURIA: 0
JOINT SWELLING: 0
CONSTIPATION: 0
HEMATOCHEZIA: 0
SHORTNESS OF BREATH: 0
HEARTBURN: 0

## 2022-08-17 ASSESSMENT — PAIN SCALES - GENERAL: PAINLEVEL: NO PAIN (0)

## 2022-08-17 ASSESSMENT — ACTIVITIES OF DAILY LIVING (ADL): CURRENT_FUNCTION: NO ASSISTANCE NEEDED

## 2022-08-17 NOTE — PATIENT INSTRUCTIONS
Patient Education   Personalized Prevention Plan  You are due for the preventive services outlined below.  Your care team is available to assist you in scheduling these services.  If you have already completed any of these items, please share that information with your care team to update in your medical record.  Health Maintenance Due   Topic Date Due     Hepatitis C Screening  Never done     Cholesterol Lab  03/06/2020     Pneumococcal Vaccine (1 - PCV) 02/07/2021     Diptheria Tetanus Pertussis (DTAP/TDAP/TD) Vaccine (3 - Td or Tdap) 04/26/2021     COVID-19 Vaccine (4 - Booster for Moderna series) 03/01/2022      Patient Education   Alcohol Use     Many people can enjoy a glass of wine or beer without any negative consequences to their health. According to the Centers for Disease Control and Prevention (CDC), having one or fewer drinks per day for women and two or fewer per day for men is considered moderate drinking.     When people drink more than moderately, it can become concerning. Excessive drinking is defined as consuming 15 drinks or more per week for men and 8 drinks or more per week for women. There are various health problems associated with excessive drinking, which include:    Damage to vital organs like the heart, brain, liver and pancreas    Harm to the digestive tract    Weaken the immune system    Higher risk for heart disease and cancer    There are many resources available to people who are addicted to alcohol. A counselor or other health care provider can give you support. So can a , , or rabbi who is trained in substance abuse counseling. Friends and family may also help once you are connected with professionals.           Understanding USDA MyPlate  The USDA has guidelines to help you make healthy food choices. These are called MyPlate. MyPlate shows the food groups that make up healthy meals using the image of a place setting. Before you eat, think about the healthiest  choices for what to put on your plate or in your cup or bowl. To learn more about building a healthy plate, visit www.choosemyplate.gov.    The food groups    Fruits. Any fruit or 100% fruit juice counts as part of the Fruit Group. Fruits may be fresh, canned, frozen, or dried, and may be whole, cut-up, or pureed. Make 1/2 of your plate fruits and vegetables.    Vegetables. Any vegetable or 100% vegetable juice counts as a member of the Vegetable Group. Vegetables may be fresh, frozen, canned, or dried. They can be served raw or cooked and may be whole, cut-up, or mashed. Make 1/2 of your plate fruits and vegetables.    Grains. All foods made from grains are part of the Grains Group. These include wheat, rice, oats, cornmeal, and barley. Grains are often used to make foods such as bread, pasta, oatmeal, cereal, tortillas, and grits. Grains should be no more than 1/4 of your plate. At least half of your grains should be whole grains.    Protein. This group includes meat, poultry, seafood, beans and peas, eggs, processed soy products (such as tofu), nuts (including nut butters), and seeds. Make protein choices no more than 1/4 of your plate. Meat and poultry choices should be lean or low fat.    Dairy. The Dairy Group includes all fluid milk products and foods made from milk that contain calcium, such as yogurt and cheese. (Foods that have little calcium, such as cream, butter, and cream cheese, are not part of this group.) Most dairy choices should be low-fat or fat-free.    Oils. Oils aren't a food group, but they do contain essential nutrients. However it's important to watch your intake of oils. These are fats that are liquid at room temperature. They include canola, corn, olive, soybean, vegetable, and sunflower oil. Foods that are mainly oil include mayonnaise, certain salad dressings, and soft margarines. You likely already get your daily oil allowance from the foods you eat.  Things to limit  Eating healthy  also means limiting these things in your diet:       Salt (sodium). Many processed foods have a lot of sodium. To keep sodium intake down, eat fresh vegetables, meats, poultry, and seafood when possible. Purchase low-sodium, reduced-sodium, or no-salt-added food products at the store. And don't add salt to your meals at home. Instead, season them with herbs and spices such as dill, oregano, cumin, and paprika. Or try adding flavor with lemon or lime zest and juice.    Saturated fat. Saturated fats are most often found in animal products such as beef, pork, and chicken. They are often solid at room temperature, such as butter. To reduce your saturated fat intake, choose leaner cuts of meat and poultry. And try healthier cooking methods such as grilling, broiling, roasting, or baking. For a simple lower-fat swap, use plain nonfat yogurt instead of mayonnaise when making potato salad or macaroni salad.    Added sugars. These are sugars added to foods. They are in foods such as ice cream, candy, soda, fruit drinks, sports drinks, energy drinks, cookies, pastries, jams, and syrups. Cut down on added sugars by sharing sweet treats with a family member or friend. You can also choose fruit for dessert, and drink water or other unsweetened beverages.     [employer] last reviewed this educational content on 6/1/2020 2000-2021 [employer]. All rights reserved. This information is not intended as a substitute for professional medical care. Always follow your healthcare professional's instructions.

## 2022-08-17 NOTE — PROGRESS NOTES
"SUBJECTIVE:   Carla Hong is a 67 year old female who presents for Preventive Visit.      Patient has been advised of split billing requirements and indicates understanding: Yes  Are you in the first 12 months of your Medicare coverage?  Yes,  Visual Acuity:  Right Eye: 20/40   Left Eye: 20/32  Both Eyes: 20/20    Healthy Habits:     In general, how would you rate your overall health?  Good    Frequency of exercise:  2-3 days/week    Duration of exercise:  30-45 minutes    Do you usually eat at least 4 servings of fruit and vegetables a day, include whole grains    & fiber and avoid regularly eating high fat or \"junk\" foods?  No    Taking medications regularly:  Yes    Medication side effects:  Not applicable    Ability to successfully perform activities of daily living:  No assistance needed    Home Safety:  No safety concerns identified    Hearing Impairment:  No hearing concerns    In the past 6 months, have you been bothered by leaking of urine?  No    In general, how would you rate your overall mental or emotional health?  Good      PHQ-2 Total Score: 0    Additional concerns today:  No    Do you feel safe in your environment? Yes    Have you ever done Advance Care Planning? (For example, a Health Directive, POLST, or a discussion with a medical provider or your loved ones about your wishes): Yes, patient states has an Advance Care Planning document and will bring a copy to the clinic.       Fall risk  Fallen 2 or more times in the past year?: No  Any fall with injury in the past year?: No    Cognitive Screening   1) Repeat 3 items (Leader, Season, Table)    2) Clock draw: NORMAL  3) 3 item recall: Recalls 3 objects  Results: 3 items recalled: COGNITIVE IMPAIRMENT LESS LIKELY    Mini-CogTM Copyright PITO Quesada. Licensed by the author for use in [employer]; reprinted with permission ([email or web address]@.[facility]). All rights reserved.      Do you have sleep apnea, excessive snoring or daytime drowsiness?: " no    Reviewed and updated as needed this visit by clinical staff                    Reviewed and updated as needed this visit by Provider                   Social History     Tobacco Use     Smoking status: Never Smoker     Smokeless tobacco: Never Used   Substance Use Topics     Alcohol use: Yes     Comment: 1-2xweek     If you drink alcohol do you typically have >3 drinks per day or >7 drinks per week? Yes      Alcohol Use 8/17/2022   Prescreen: >3 drinks/day or >7 drinks/week? Yes   Prescreen: >3 drinks/day or >7 drinks/week? -   AUDIT SCORE  5     AUDIT - Alcohol Use Disorders Identification Test - Reproduced from the World Health Organization Audit 2001 (Second Edition) 8/17/2022   1.  How often do you have a drink containing alcohol? 2 to 3 times a week   2.  How many drinks containing alcohol do you have on a typical day when you are drinking? 3 or 4   3.  How often do you have five or more drinks on one occasion? Less than monthly   4.  How often during the last year have you found that you were not able to stop drinking once you had started? Never   5.  How often during the last year have you failed to do what was normally expected of you because of drinking? Never   6.  How often during the last year have you needed a first drink in the morning to get yourself going after a heavy drinking session? Never   7.  How often during the last year have you had a feeling of guilt or remorse after drinking? Never   8.  How often during the last year have you been unable to remember what happened the night before because of your drinking? Never   9.  Have you or someone else been injured because of your drinking? No   10. Has a relative, friend, doctor or other health care worker been concerned about your drinking or suggested you cut down? No   TOTAL SCORE 5               Current providers sharing in care for this patient include:   Patient Care Team:  Clinic - MOLLY Zuniga as PCP - [facility]  Henrry SHELDON MD as MD (Oncology)  Citlaly Flower APRN CNM as Certified Nurse Midwife (Midwives)  Tiesha Null MD as Assigned PCP    The following health maintenance items are reviewed in Epic and correct as of today:  Health Maintenance Due   Topic Date Due     ANNUAL REVIEW OF HM ORDERS  Never done     HEPATITIS C SCREENING  Never done     LIPID  03/06/2020     MEDICARE ANNUAL WELLNESS VISIT  02/07/2021     Pneumococcal Vaccine: 65+ Years (1 - PCV) 02/07/2021     DTAP/TDAP/TD IMMUNIZATION (3 - Td or Tdap) 04/26/2021     COVID-19 Vaccine (4 - Booster for Moderna series) 03/01/2022     Labs reviewed in EPIC      FHS-7:   Breast CA Risk Assessment (FHS-7) 9/22/2021 10/4/2021 8/17/2022   Did any of your first-degree relatives have breast or ovarian cancer? Yes Yes Yes   Did any of your relatives have bilateral breast cancer? No No No   Did any man in your family have breast cancer? No No No   Did any woman in your family have breast and ovarian cancer? No No Yes   Did any woman in your family have breast cancer before age 50 y? No No No   Do you have 2 or more relatives with breast and/or ovarian cancer? No No No   Do you have 2 or more relatives with breast and/or bowel cancer? - No No       Mammogram Screening: Recommended mammography every 1-2 years with patient discussion and risk factor consideration  Pertinent mammograms are reviewed under the imaging tab.    Review of Systems   Constitutional: Negative for chills and fever.   HENT: Negative for congestion, ear pain, hearing loss and sore throat.    Eyes: Negative for pain and visual disturbance.   Respiratory: Negative for cough and shortness of breath.    Cardiovascular: Negative for chest pain, palpitations and peripheral edema.   Gastrointestinal: Negative for abdominal pain, constipation, diarrhea, heartburn, hematochezia and nausea.   Breasts:  Negative for tenderness, breast mass and discharge.   Genitourinary: Negative for dysuria, frequency,  "genital sores, hematuria, pelvic pain, urgency, vaginal bleeding and vaginal discharge.   Musculoskeletal: Negative for arthralgias, joint swelling and myalgias.   Skin: Negative for rash.   Neurological: Positive for headaches. Negative for dizziness, weakness and paresthesias.   Psychiatric/Behavioral: Positive for mood changes. The patient is not nervous/anxious.      Constitutional, HEENT, cardiovascular, pulmonary, gi and gu systems are negative, except as otherwise noted.    OBJECTIVE:   LMP 03/13/2007  Estimated body mass index is 21.04 kg/m  as calculated from the following:    Height as of 10/8/21: 1.737 m (5' 8.4\").    Weight as of 10/8/21: 63.5 kg (140 lb).  Physical Exam  GENERAL APPEARANCE: healthy, alert and no distress  EYES: Eyes grossly normal to inspection, PERRL and conjunctivae and sclerae normal  HENT: ear canals and TM's normal, nose and mouth without ulcers or lesions, oropharynx clear and oral mucous membranes moist  NECK: no adenopathy, no asymmetry, masses, or scars and thyroid normal to palpation  RESP: lungs clear to auscultation - no rales, rhonchi or wheezes  BREAST: normal without masses, tenderness or nipple discharge and no palpable axillary masses or adenopathy  CV: regular rate and rhythm, normal S1 S2, no S3 or S4, no murmur, click or rub, no peripheral edema and peripheral pulses strong  ABDOMEN: soft, nontender, no hepatosplenomegaly, no masses and bowel sounds normal  MS: no musculoskeletal defects are noted and gait is age appropriate without ataxia  SKIN: no suspicious lesions or rashes  NEURO: Normal strength and tone, sensory exam grossly normal, mentation intact and speech normal  PSYCH: mentation appears normal and affect normal/bright    Diagnostic Test Results:  Labs reviewed in Epic    ASSESSMENT / PLAN:   (Z00.00) Encounter for Medicare annual wellness exam  (primary encounter diagnosis)  Comment:   Plan:         (H13.356) Screening for hyperlipidemia  Comment: " "  Plan: Comprehensive metabolic panel, Lipid panel         reflex to direct LDL Non-fasting            (B00.1) Herpes labialis  Comment:   Plan: valACYclovir (VALTREX) 1000 mg tablet        Stable no change in treatment plan.       Patient has been advised of split billing requirements and indicates understanding: Yes    COUNSELING:  Reviewed preventive health counseling, as reflected in patient instructions    Estimated body mass index is 21.04 kg/m  as calculated from the following:    Height as of 10/8/21: 1.737 m (5' 8.4\").    Weight as of 10/8/21: 63.5 kg (140 lb).        She reports that she has never smoked. She has never used smokeless tobacco.      Appropriate preventive services were discussed with this patient, including applicable screening as appropriate for cardiovascular disease, diabetes, osteopenia/osteoporosis, and glaucoma.  As appropriate for age/gender, discussed screening for colorectal cancer, prostate cancer, breast cancer, and cervical cancer. Checklist reviewing preventive services available has been given to the patient.    Reviewed patients plan of care and provided an AVS. The Basic Care Plan (routine screening as documented in Health Maintenance) for Carla meets the Care Plan requirement. This Care Plan has been established and reviewed with the Patient.    Counseling Resources:  ATP IV Guidelines  Pooled Cohorts Equation Calculator  Breast Cancer Risk Calculator  Breast Cancer: Medication to Reduce Risk  FRAX Risk Assessment  ICSI Preventive Guidelines  Dietary Guidelines for Americans, 2010  USDA's MyPlate  ASA Prophylaxis  Lung CA Screening    Tiesha Null MD  [facility]    Identified Health Risks:  "

## 2022-08-17 NOTE — PROGRESS NOTES
The patient reports that she drinks more than one alcoholic drink per day but denies binge or excessive drinking. She was counseled and given information about possible harmful effects of excessive alcohol intake.  The patient was counseled and encouraged to consider modifying their diet and eating habits. She was provided with information on recommended healthy diet options.

## 2022-08-17 NOTE — NURSING NOTE
"Chief Complaint   Patient presents with     Medicare Visit     /72   Pulse 67   Temp 97.2  F (36.2  C) (Tympanic)   Resp 12   Ht 1.702 m (5' 7\")   Wt 64.9 kg (143 lb)   LMP 03/13/2007   SpO2 97%   BMI 22.40 kg/m   Estimated body mass index is 22.4 kg/m  as calculated from the following:    Height as of this encounter: 1.702 m (5' 7\").    Weight as of this encounter: 64.9 kg (143 lb).  Patient presents to the clinic using No DME      Health Maintenance that is potentially due pending provider review:    Health Maintenance Due   Topic Date Due     ANNUAL REVIEW OF HM ORDERS  Never done     HEPATITIS C SCREENING  Never done     LIPID  03/06/2020     Pneumococcal Vaccine: 65+ Years (1 - PCV) 02/07/2021     DTAP/TDAP/TD IMMUNIZATION (3 - Td or Tdap) 04/26/2021     COVID-19 Vaccine (4 - Booster for Moderna series) 03/01/2022        Pended.        "

## 2022-09-26 ENCOUNTER — MYC MEDICAL ADVICE (OUTPATIENT)
Dept: FAMILY MEDICINE | Facility: CLINIC | Age: 67
End: 2022-09-26

## 2022-09-26 DIAGNOSIS — N81.4 UTERINE PROLAPSE: Primary | ICD-10-CM

## 2022-10-11 NOTE — TELEPHONE ENCOUNTER
FUTURE VISIT INFORMATION      FUTURE VISIT INFORMATION:    Date: 11/29/22    Time: 10:00am    [place] [facility] [place]  REFERRAL INFORMATION:    Referring provider:  Tiesha Null MD    Referring providers clinic:  [facility]  Reason for visit/diagnosis  Ptosis of both eyelids   RECORDS REQUESTED FROM:       Clinic name Comments Records Status Imaging Status   [facility] MyChart advice/referral 10/10 [facility]

## 2022-10-17 ENCOUNTER — IMMUNIZATION (OUTPATIENT)
Dept: FAMILY MEDICINE | Facility: CLINIC | Age: 67
End: 2022-10-17
Payer: COMMERCIAL

## 2022-10-17 ENCOUNTER — TELEPHONE (OUTPATIENT)
Dept: OPHTHALMOLOGY | Facility: CLINIC | Age: 67
End: 2022-10-17

## 2022-10-17 ENCOUNTER — HOSPITAL ENCOUNTER (OUTPATIENT)
Dept: MAMMOGRAPHY | Facility: CLINIC | Age: 67
Discharge: HOME | End: 2022-10-17
Attending: FAMILY MEDICINE | Admitting: FAMILY MEDICINE
Payer: MEDICARE

## 2022-10-17 DIAGNOSIS — Z12.31 VISIT FOR SCREENING MAMMOGRAM: ICD-10-CM

## 2022-10-17 PROCEDURE — 91313 COVID-19,PF,MODERNA BIVALENT: CPT

## 2022-10-17 PROCEDURE — 0134A COVID-19,PF,MODERNA BIVALENT: CPT

## 2022-10-17 PROCEDURE — 77067 SCR MAMMO BI INCL CAD: CPT

## 2022-10-17 NOTE — TELEPHONE ENCOUNTER
Spoke with patient regarding scheduling for a sooner appointment for the wait-list. Patient scheduled as offered and is aware of time and clinic location.-Per Patient

## 2022-10-18 ENCOUNTER — OFFICE VISIT (OUTPATIENT)
Dept: OPHTHALMOLOGY | Facility: CLINIC | Age: 67
End: 2022-10-18
Attending: FAMILY MEDICINE
Payer: COMMERCIAL

## 2022-10-18 DIAGNOSIS — H02.403 PTOSIS OF BOTH EYELIDS: ICD-10-CM

## 2022-10-18 PROCEDURE — 99203 OFFICE O/P NEW LOW 30 MIN: CPT | Mod: GC | Performed by: OPHTHALMOLOGY

## 2022-10-18 PROCEDURE — 92285 EXTERNAL OCULAR PHOTOGRAPHY: CPT | Mod: GC | Performed by: OPHTHALMOLOGY

## 2022-10-18 ASSESSMENT — MARGIN REFLEX DISTANCE
OD_MRD1: 3
OS_MRD1: 2.5

## 2022-10-18 ASSESSMENT — CONF VISUAL FIELD
OD_INFERIOR_TEMPORAL_RESTRICTION: 0
OD_INFERIOR_NASAL_RESTRICTION: 0
OS_SUPERIOR_NASAL_RESTRICTION: 0
OS_SUPERIOR_TEMPORAL_RESTRICTION: 0
OD_NORMAL: 1
OS_INFERIOR_NASAL_RESTRICTION: 0
OS_INFERIOR_TEMPORAL_RESTRICTION: 0
OD_SUPERIOR_TEMPORAL_RESTRICTION: 0
METHOD: COUNTING FINGERS
OS_NORMAL: 1
OD_SUPERIOR_NASAL_RESTRICTION: 0

## 2022-10-18 ASSESSMENT — VISUAL ACUITY
OS_CC: 20/20
OS_CC+: -2
CORRECTION_TYPE: GLASSES
OD_CC: 20/20
METHOD: SNELLEN - LINEAR

## 2022-10-18 ASSESSMENT — TONOMETRY
OS_IOP_MMHG: 17
IOP_METHOD: ICARE
OD_IOP_MMHG: 17

## 2022-10-18 ASSESSMENT — LAGOPHTHALMOS
OD_LAGOPHTHALMOS: 0
OS_LAGOPHTHALMOS: 0

## 2022-10-18 ASSESSMENT — LEVATOR FUNCTION
OS_LEVATOR: 15
OD_LEVATOR: 15

## 2022-10-18 ASSESSMENT — EXTERNAL EXAM - LEFT EYE: OS_EXAM: NORMAL

## 2022-10-18 ASSESSMENT — EXTERNAL EXAM - RIGHT EYE: OD_EXAM: RIGHT BROW PTOSIS

## 2022-10-18 NOTE — NURSING NOTE
Chief Complaints and History of Present Illnesses   Patient presents with     Droopy Eye Lid Evaluation     Chief Complaint(s) and History of Present Illness(es)     Droopy Eye Lid Evaluation            Laterality: right lower lid and left upper lid    Associated signs and symptoms: Negative for blurred vision, trauma, eye pain, redness and neck pain    Pain scale: 0/10          Comments    Patient states lids droop but it comes and goes based on how she sleeps. Patient states is she sleeps poorly her lids droop each eye. She had a good night's sleep last night so it doesn't look bad today. Patient states she can see it in photographs. Patient says she lifts her forehead to see better. Patient sates this began when she started working from home 2 years ago from COVID.     TADEO Jerry October 18, 2022 2:01 PM

## 2022-10-18 NOTE — PROGRESS NOTES
Oculoplastic Clinic New Patient    Patient: Carla Hong MRN# [id number]   YOB: 1955 Age: 67 year old   Date of Visit: Oct 18, 2022    CC: Droopy eyelids obstructing vision.              HPI:     Chief Complaint(s) and History of Present Illness(es)     Droopy Eye Lid Evaluation            Laterality: right lower lid and left upper lid    Associated signs and symptoms: Negative for blurred vision, trauma, eye   pain, redness and neck pain    Pain scale: 0/10    Referred by Dr. Tiesha Null for heavy upper eyelids.         Comments    Patient states lids droop but it comes and goes based on how she sleeps.   Patient states is she sleeps poorly her lids droop each eye. She had a   good night's sleep last night so it doesn't look bad today. Patient states   she can see it in photographs. Patient says she lifts her forehead to see   better. Patient sates this began when she started working from home 2   years ago from COVID.     TADEO Jerry October 18, 2022 2:01 PM         she has noticed at times when she wakes up her eyelids are droopy and heavy, and progressively improves throughout the day. In the evening they tend to feel heavy again. She denies diplopia. No personal history of autoimmune conditions, no known myasthenia gravis or thyroid disease.       Carla Hong is a 67 year old female who has noted gradual onset of droopy eyelids over the past years. The droopy eyelid is interfering with activities of daily living including driving, and reading. The patient denies double vision, variability of the eyelid position, or dry eye symptoms.     EXAM:     MRD1: 3/3  Dermatochalasis with excess skin touching eyelashes       Assessment & Plan     Carla Hong is a 67 year old female with the following diagnoses:   1. Ptosis of both eyelids       No fatiguability on upgaze. No diplopia    Plan:   We discussed options. She has right brow ptosis worse than left, mild  "dermatochalasis and mild aponeurotic ptosis.  Options would include blepharoplasty and potentially a brow lift option but it would not yet be considered \"visually significant\" so it would be an out of pocket cost. Alternatively we can observe and have her follow up in several years. She can try Upneeq, we only have one sample and this was given to her.    She elected to observe.    Start artificial tears, she can put a drop in at bedtime given she feels she has some trouble opening her eyes in the morning.      ANTICOAGULATION: none  Supplements vitamin D, women's multivitamin         PHOTOS DEMONSTRATE:    Significant dermatochalasis with lids resting on eyelashes and obstructing visual axis  Blepharoptosis    Attending Physician Attestation: Complete documentation of historical and exam elements from today's encounter can be found in the full encounter summary report (not reduplicated in this progress note). I personally obtained the chief complaint(s) and history of present illness. I confirmed and edited as necessary the review of systems, past medical/surgical history, family history, social history, and examination findings as documented by others; and I examined the patient myself. I personally reviewed the relevant tests, images, and reports as documented above. I formulated and edited as necessary the assessment and plan and discussed the findings and management plan with the patient.  -Pippa Carson MD      Today with Carla Hong I reviewed the indications, risks, benefits, and alternatives of the proposed surgical procedure including, but not limited to, failure obtain the desired result  and need for additional surgery, bleeding, infection, loss of vision, loss of the eye, and the remote possibility of permanent damage to any organ system or death with the use of anesthesia.  I provided multiple opportunities for the questions, answered all questions to the best of my ability, and confirmed " that my answers and my discussion were understood.

## 2022-10-18 NOTE — LETTER
10/18/2022         RE:  :  MRN: Carla Hong  1955  [id number]     Dear Dr. Tiesha Null,    Thank you for asking me to see your patient, Carla Hong, for an oculoplastic   consultation.  My assessment and plan are below.  For further details, please see my attached clinic note.           HPI:     Chief Complaint(s) and History of Present Illness(es)     Droopy Eye Lid Evaluation            Laterality: right lower lid and left upper lid    Associated signs and symptoms: Negative for blurred vision, trauma, eye   pain, redness and neck pain    Pain scale: 0/10    Referred by Dr. Tiesha Null for heavy upper eyelids.         Comments    Patient states lids droop but it comes and goes based on how she sleeps.   Patient states is she sleeps poorly her lids droop each eye. She had a   good night's sleep last night so it doesn't look bad today. Patient states   she can see it in photographs. Patient says she lifts her forehead to see   better. Patient sates this began when she started working from home 2   years ago from COVID.     [facility] 2022 2:01 PM         she has noticed at times when she wakes up her eyelids are droopy and heavy, and progressively improves throughout the day. In the evening they tend to feel heavy again. She denies diplopia. No personal history of autoimmune conditions, no known myasthenia gravis or thyroid disease.       Carla Hong is a 67 year old female who has noted gradual onset of droopy eyelids over the past years. The droopy eyelid is interfering with activities of daily living including driving, and reading. The patient denies double vision, variability of the eyelid position, or dry eye symptoms.     EXAM:     MRD1: 3/3  Dermatochalasis with excess skin touching eyelashes       Assessment & Plan     Carla Hong is a 67 year old female with the following diagnoses:   1. Ptosis of both eyelids       No fatiguability on  "upgaze. No diplopia    Plan:   We discussed options. She has right brow ptosis worse than left, mild dermatochalasis and mild aponeurotic ptosis.  Options would include blepharoplasty and potentially a brow lift option but it would not yet be considered \"visually significant\" so it would be an out of pocket cost. Alternatively we can observe and have her follow up in several years. She can try Upneeq, we only have one sample and this was given to her.    She elected to observe.    Start artificial tears, she can put a drop in at bedtime given she feels she has some trouble opening her eyes in the morning.      ANTICOAGULATION: none  Supplements vitamin D, women's multivitamin        Again, thank you for allowing me to participate in the care of your patient.      Sincerely,    Pippa Carson MD  Department of Ophthalmology and Visual Neurosciences  [facility]    CC: Tiesha Null MD  [address] [place] [phone]  Via In Basket  "

## 2022-10-18 NOTE — PATIENT INSTRUCTIONS
"Ptosis (Drooping Eyelids)    Eyelid ptosis (pronounced \"evita-sis\") is a condition in which the upper eyelid droops or sags. It can affect one or both eyes. Sometimes the eyelid droops enough to obstruct the upper field of vision and/or side vision, requiring correction. Ptosis repair is a surgical procedure that can correct drooping eyelid(s). Depending upon the degree and cause, repair involves either resection (shortening) of a muscle in the eyelid or suspension with a muscle of the brow. Typically, the levator muscle (the major muscle responsible for elevating the upper eyelid) is shortened through an incision made along the natural crease of the lid. Excess skin weighing down the eyelid may also be removed.     Congenital Ptosis  Present from birth, the most common cause of congenital ptosis is the improper development of the levator muscle. Children may need tilt their head back or lift their eyelid with a finger to see. They may also develop amblyopia (\"lazy eye\"), strabismus (eyes that are not properly aligned), astigmatism, or blurred vision. Repair for mild to moderate congenital ptosis is generally performed between ages 3 and 5. Severe visual obstruction may require earlier treatment. Repair is typically performed on an outpatient basis under general anesthesia so the child will not become anxious or restless during the procedure.     Acquired Ptosis  Most commonly due to age-related weakening of the levator muscle, acquired ptosis may also be caused by injury, trauma, or procedures, such as cataract surgery, which can cause weak tendons to stretch. Acquired ptosis may also be the first sign of some diseases, such as myasthenia gravis (a disorder in which the muscles become weak), or Yusuf's syndrome (a neurological condition that indicates injury to part of the sympathetic nervous system). Ptosis Repair is usually performed in an outpatient surgical facility under anesthesia that induces a \"twilight\" " state. Sedated consciousness is preferred so that Dr. Carson can accurately adjust the eyelids.     Who Should Perform The Surgery?   When choosing a surgeon to perform ptosis surgery, look for a cosmetic and reconstructive surgeon who specializes in the eyelids, orbit, and tear drain system. Dr. Carson's membership in the American Society of Ophthalmic Plastic and Reconstructive Surgery (ASOPRS) indicates he or she is not only a board certified ophthalmologist who knows the anatomy and structure of the eyelids and orbit, but also has had extensive training in ophthalmic plastic reconstructive and cosmetic surgery.    BLEPHAROPLASTY    Your eyes are often the first thing people notice about you and are an important aspect of your overall appearance. As we age, the tone and shape of our eyelids can loosen and sag. Heredity and sun exposure also contribute to this process. This excess, puffy or lax skin can make you appear more tired or appear older. Eyelid surgery or blepharoplasty (pronounced  lbfb-w-gl-plasty ) can give the eyes a more youthful look by removing excess skin, bulging fat, and lax muscle from the upper or lower eyelids. If the sagging upper eyelid skin obstructs peripheral vision, blepharoplasty can eliminate the obstruction and expand the visual field.     Upper Blepharoplasty     For the upper eyelids, excess skin and fat are removed through an incision hidden in the natural eyelid crease. If the lid is droopy (ptosis), the muscle that raises the upper eyelid can be tightened. The incision is then closed with fine sutures.     Lower Blepharoplasty     Fat in the lower eyelids can be removed or repositioned through an incision hidden on the inner surface of the eyelid.  If there is excessive skin in the lower lid, the skin can be removed through incision is made just below the lashes. Fat can be removed or repositioned through this incision, and the excess skin removed. The incision is  then closed with fine sutures.     Upper and Lower Blepharoplasty     Upper and lower blepharoplasty can be performed together and also can be combined with other procedures such as eyebrow or forehead lift, or midface lift.  The procedures are typically performed as an outpatient procedure and typically take 45 min to 1.5 hours to perform.  Most patients can return to normal activities within 10-14 days. Makeup may be worn to camouflage any bruising after 10 days.       Who Should Perform A Blepharoplasty?     When choosing a surgeon to perform blepharoplasty, look for a cosmetic and reconstructive surgeon who specializes in the eyelids, orbit, and tear drain system. Dr. Carson's membership in the American Society of Ophthalmic Plastic and Reconstructive Surgery (ASOPRS) indicates he is not only a board certified ophthalmologist who knows the anatomy and structure of the eyelids and orbit, but also has had extensive training in ophthalmic plastic reconstructive and cosmetic surgery.

## 2022-10-26 ENCOUNTER — OFFICE VISIT (OUTPATIENT)
Dept: OBGYN | Facility: CLINIC | Age: 67
End: 2022-10-26
Payer: COMMERCIAL

## 2022-10-26 VITALS
SYSTOLIC BLOOD PRESSURE: 128 MMHG | HEART RATE: 67 BPM | RESPIRATION RATE: 14 BRPM | TEMPERATURE: 97.2 F | BODY MASS INDEX: 22.4 KG/M2 | DIASTOLIC BLOOD PRESSURE: 85 MMHG | HEIGHT: 67 IN

## 2022-10-26 DIAGNOSIS — N95.2 VAGINAL ATROPHY: Primary | ICD-10-CM

## 2022-10-26 PROCEDURE — 99203 OFFICE O/P NEW LOW 30 MIN: CPT | Performed by: OBSTETRICS & GYNECOLOGY

## 2022-10-26 NOTE — NURSING NOTE
"Initial /85 (BP Location: Right arm, Patient Position: Chair, Cuff Size: Adult Regular)   Pulse 67   Temp 97.2  F (36.2  C) (Tympanic)   Resp 14   Ht 1.702 m (5' 7\")   LMP 03/13/2007   BMI 22.40 kg/m   Estimated body mass index is 22.4 kg/m  as calculated from the following:    Height as of this encounter: 1.702 m (5' 7\").    Weight as of 8/17/22: 64.9 kg (143 lb). .    Cyndie Mendoza, SOURAV    "

## 2022-10-26 NOTE — PROGRESS NOTES
Chief Complaint   Patient presents with     Consult     Prolapse         HPI:  Carla Hong, 67 year old,  presents with complaints of pain with intercourse and feeling something in the vagina.  The pain with intercourse is more vaginal dryness.  She does not have intercourse very often but when she does it feels dry.  She was in the shower and examined herself to see if anything was wrong and felt a bulge high up in the vagina.  She was not sure what it was and was wondering if her uterus or bladder was falling down.  Nothing is hanging out of the vagina.  It does not hurt otherwise.      Past Medical History:   Diagnosis Date     Vulvar cancer (H)      Past Surgical History:   Procedure Laterality Date     BUNIONECTOMY Right 2018    Procedure: 1.  Lapidus bunionectomy right foot  2. hammertoe correction third toe right foot;  Surgeon: Saqib Interiano DPM;  Location: [facility]     COLONOSCOPY  2010    COLONOSCOPY performed by RYAN OSEGUERA at [facility]     COLONOSCOPY N/A 2021    Procedure: COLONOSCOPY;  Surgeon: Singh Horta MD;  Location: [facility]     MAMMOPLASTY REDUCTION       MAMMOPLASTY REDUCTION BILATERAL Bilateral 2020    Procedure: MAMMOPLASTY, REDUCTION, BILATERAL;  Surgeon: MOLLY Kang MD;  Location: [facility]     VULVECTOMY RADICAL DISSECT GROIN(S)  2012    Procedure: VULVECTOMY RADICAL DISSECT GROIN(S);  Radical Vulvectomy Bilateral Inginal Femoral Lymph Node Dissection.;  Surgeon: Henrry Granger MD;  Location:  OR     Social History     Socioeconomic History     Marital status:      Spouse name: Not on file     Number of children: Not on file     Years of education: Not on file     Highest education level: Not on file   Occupational History     Not on file   Tobacco Use     Smoking status: Never     Smokeless tobacco: Never   Vaping Use     Vaping Use: Never used   Substance and Sexual Activity     Alcohol use: Yes     Comment:  "1-2xweek     Drug use: No     Sexual activity: Yes     Partners: Male     Birth control/protection: Surgical     Comment: Vasectomy   Other Topics Concern     Parent/sibling w/ CABG, MI or angioplasty before 65F 55M? No   Social History Narrative     Not on file     Social Determinants of Health     Financial Resource Strain: Not on file   Food Insecurity: Not on file   Transportation Needs: Not on file   Physical Activity: Not on file   Stress: Not on file   Social Connections: Not on file   Intimate Partner Violence: Not on file   Housing Stability: Not on file     Family History   Problem Relation Age of Onset     Diabetes Maternal Grandmother      Cancer Mother         renal cell carcinoma     Cancer Son         brain cancer/germinoma     Hypertension No family hx of      Cerebrovascular Disease No family hx of      Breast Cancer No family hx of      Cancer - colorectal No family hx of      Prostate Cancer No family hx of      C.A.D. No family hx of         Current Outpatient Medications   Medication Sig Dispense Refill     CALCIUM + D PO 1 daily       Multiple Vitamin (DAILY MULTIVITAMIN PO) Take 1 tablet by mouth daily.       fluticasone (FLONASE) 50 MCG/ACT nasal spray Spray 1 spray into both nostrils daily (Patient not taking: Reported on 10/26/2022) 9 mL 0     MELATONIN PO Take 10 mg by mouth  (Patient not taking: Reported on 10/26/2022)       valACYclovir (VALTREX) 1000 mg tablet Take 2 tablets (2,000 mg) by mouth 2 times daily Keep extra tabs on hand in case of another outbreak (Patient not taking: Reported on 10/26/2022) 8 tablet 4        Allergies:     Allergies   Allergen Reactions     Nickel Rash        ROS:  See HPI      Physical Exam:  /85 (BP Location: Right arm, Patient Position: Chair, Cuff Size: Adult Regular)   Pulse 67   Temp 97.2  F (36.2  C) (Tympanic)   Resp 14   Ht 1.702 m (5' 7\")   LMP 03/13/2007   BMI 22.40 kg/m       Appearance: well developed, well nourished, no acute " distress  Head Exam normocephalic, no lesions or deformities  Abdomen: soft, non-tender, no masses, no organomegaly, no hernia,  Skin: no lesions  Extremities: no edema  Psych: orientated x3    Genitourinary Exam  Vulva: normal, no lesions  Urethral meatus: normal size and location, no lesions or discharge  Urethra: no tenderness or masses  Bladder: no fullness or tenderness  Vagina: no cystocele and rectocele  Cervix: normal appearance, no lesions, no discharge, no cervical motion tenderness  Uterus: normal position, mobile, non-tender, size 4 weeks  Adnexa: no palpable masses bilaterally    Assessment/Plan:  Encounter Diagnosis   Name Primary?     Vaginal atrophy Yes         We reviewed pictures and using a uterine model, it seemed what she was feeling high in the vagina was likely her cervix.  She felt reassured.    We discussed vaginal atrophy.  Discussed lubricants including emollient based, hyaluronic acid or silicone based lubricants.  Discussed vaginal estrogen.  She will start with trying emollient based lubricants and didn't feel she needed estrogen today.        Lavinia Sanderson MD on 10/26/2022 at 10:13 AM

## 2022-11-10 ENCOUNTER — MYC MEDICAL ADVICE (OUTPATIENT)
Dept: FAMILY MEDICINE | Facility: CLINIC | Age: 67
End: 2022-11-10

## 2022-11-10 ENCOUNTER — E-VISIT (OUTPATIENT)
Dept: FAMILY MEDICINE | Facility: CLINIC | Age: 67
End: 2022-11-10
Payer: COMMERCIAL

## 2022-11-10 DIAGNOSIS — J01.01 ACUTE RECURRENT MAXILLARY SINUSITIS: Primary | ICD-10-CM

## 2022-11-10 PROCEDURE — 99421 OL DIG E/M SVC 5-10 MIN: CPT | Performed by: PHYSICIAN ASSISTANT

## 2022-11-10 NOTE — PATIENT INSTRUCTIONS
Thank you for choosing us for your care. I have placed an order for a prescription so that you can start treatment. View your full visit summary for details by clicking on the link below. Your pharmacist will able to address any questions you may have about the medication.     If you're not feeling better within 5-7 days, please schedule an appointment.  You can schedule an appointment right here in [facility], or call [phone]  If the visit is for the same symptoms as your eVisit, we'll refund the cost of your eVisit if seen within seven days.

## 2022-11-19 ENCOUNTER — HEALTH MAINTENANCE LETTER (OUTPATIENT)
Age: 67
End: 2022-11-19

## 2022-11-29 ENCOUNTER — PRE VISIT (OUTPATIENT)
Dept: OPHTHALMOLOGY | Facility: CLINIC | Age: 67
End: 2022-11-29

## 2023-01-08 ENCOUNTER — MYC MEDICAL ADVICE (OUTPATIENT)
Dept: FAMILY MEDICINE | Facility: CLINIC | Age: 68
End: 2023-01-08

## 2023-01-08 DIAGNOSIS — B00.1 HERPES LABIALIS: ICD-10-CM

## 2023-01-09 RX ORDER — VALACYCLOVIR HYDROCHLORIDE 1 G/1
2000 TABLET, FILM COATED ORAL 2 TIMES DAILY
Qty: 8 TABLET | Refills: 4 | Status: SHIPPED | OUTPATIENT
Start: 2023-01-09 | End: 2023-01-12

## 2023-01-11 DIAGNOSIS — B00.1 HERPES LABIALIS: ICD-10-CM

## 2023-01-12 RX ORDER — VALACYCLOVIR HYDROCHLORIDE 1 G/1
TABLET, FILM COATED ORAL
Qty: 8 TABLET | Refills: 2 | Status: SHIPPED | OUTPATIENT
Start: 2023-01-12

## 2023-10-07 ASSESSMENT — ENCOUNTER SYMPTOMS
FEVER: 0
DIZZINESS: 1
HEADACHES: 0
WEAKNESS: 0
DIARRHEA: 0
FREQUENCY: 0
BREAST MASS: 0
COUGH: 0
CHILLS: 0
SORE THROAT: 0
ARTHRALGIAS: 0
CONSTIPATION: 0
HEMATOCHEZIA: 0
NAUSEA: 0
NERVOUS/ANXIOUS: 1
PARESTHESIAS: 0
PALPITATIONS: 0
EYE PAIN: 0
JOINT SWELLING: 0
HEMATURIA: 0
DYSURIA: 0
ABDOMINAL PAIN: 0
SHORTNESS OF BREATH: 0
HEARTBURN: 0
MYALGIAS: 0

## 2023-10-07 ASSESSMENT — ACTIVITIES OF DAILY LIVING (ADL): CURRENT_FUNCTION: NO ASSISTANCE NEEDED

## 2023-10-10 ENCOUNTER — OFFICE VISIT (OUTPATIENT)
Dept: FAMILY MEDICINE | Facility: CLINIC | Age: 68
End: 2023-10-10
Payer: COMMERCIAL

## 2023-10-10 VITALS
SYSTOLIC BLOOD PRESSURE: 136 MMHG | OXYGEN SATURATION: 97 % | WEIGHT: 140 LBS | BODY MASS INDEX: 21.97 KG/M2 | RESPIRATION RATE: 16 BRPM | HEIGHT: 67 IN | DIASTOLIC BLOOD PRESSURE: 74 MMHG | TEMPERATURE: 97.9 F | HEART RATE: 60 BPM

## 2023-10-10 DIAGNOSIS — L98.9 SKIN LESION: ICD-10-CM

## 2023-10-10 DIAGNOSIS — E78.5 HYPERLIPIDEMIA LDL GOAL <70: ICD-10-CM

## 2023-10-10 DIAGNOSIS — Z00.00 ENCOUNTER FOR MEDICARE ANNUAL WELLNESS EXAM: ICD-10-CM

## 2023-10-10 DIAGNOSIS — Z00.00 ANNUAL PHYSICAL EXAM: Primary | ICD-10-CM

## 2023-10-10 LAB
ALBUMIN SERPL BCG-MCNC: 4.8 G/DL (ref 3.5–5.2)
ALP SERPL-CCNC: 66 U/L (ref 35–104)
ALT SERPL W P-5'-P-CCNC: 18 U/L (ref 0–50)
ANION GAP SERPL CALCULATED.3IONS-SCNC: 11 MMOL/L (ref 7–15)
AST SERPL W P-5'-P-CCNC: 25 U/L (ref 0–45)
BILIRUB SERPL-MCNC: 0.4 MG/DL
BUN SERPL-MCNC: 10.4 MG/DL (ref 8–23)
CALCIUM SERPL-MCNC: 10.2 MG/DL (ref 8.8–10.2)
CHLORIDE SERPL-SCNC: 101 MMOL/L (ref 98–107)
CHOLEST SERPL-MCNC: 252 MG/DL
CREAT SERPL-MCNC: 0.76 MG/DL (ref 0.51–0.95)
DEPRECATED HCO3 PLAS-SCNC: 26 MMOL/L (ref 22–29)
EGFRCR SERPLBLD CKD-EPI 2021: 85 ML/MIN/1.73M2
GLUCOSE SERPL-MCNC: 95 MG/DL (ref 70–99)
HDLC SERPL-MCNC: 83 MG/DL
LDLC SERPL CALC-MCNC: 154 MG/DL
NONHDLC SERPL-MCNC: 169 MG/DL
POTASSIUM SERPL-SCNC: 4.5 MMOL/L (ref 3.4–5.3)
PROT SERPL-MCNC: 7.4 G/DL (ref 6.4–8.3)
SODIUM SERPL-SCNC: 138 MMOL/L (ref 135–145)
TRIGL SERPL-MCNC: 75 MG/DL
TSH SERPL DL<=0.005 MIU/L-ACNC: 0.83 UIU/ML (ref 0.3–4.2)

## 2023-10-10 PROCEDURE — 99213 OFFICE O/P EST LOW 20 MIN: CPT | Mod: 25 | Performed by: NURSE PRACTITIONER

## 2023-10-10 PROCEDURE — G0008 ADMIN INFLUENZA VIRUS VAC: HCPCS | Performed by: NURSE PRACTITIONER

## 2023-10-10 PROCEDURE — 80061 LIPID PANEL: CPT | Performed by: NURSE PRACTITIONER

## 2023-10-10 PROCEDURE — G0439 PPPS, SUBSEQ VISIT: HCPCS | Performed by: NURSE PRACTITIONER

## 2023-10-10 PROCEDURE — 84443 ASSAY THYROID STIM HORMONE: CPT | Performed by: NURSE PRACTITIONER

## 2023-10-10 PROCEDURE — 90662 IIV NO PRSV INCREASED AG IM: CPT | Performed by: NURSE PRACTITIONER

## 2023-10-10 PROCEDURE — 90480 ADMN SARSCOV2 VAC 1/ONLY CMP: CPT | Performed by: NURSE PRACTITIONER

## 2023-10-10 PROCEDURE — 91320 SARSCV2 VAC 30MCG TRS-SUC IM: CPT | Performed by: NURSE PRACTITIONER

## 2023-10-10 PROCEDURE — 80053 COMPREHEN METABOLIC PANEL: CPT | Performed by: NURSE PRACTITIONER

## 2023-10-10 PROCEDURE — 36415 COLL VENOUS BLD VENIPUNCTURE: CPT | Performed by: NURSE PRACTITIONER

## 2023-10-10 ASSESSMENT — ENCOUNTER SYMPTOMS
FEVER: 0
CHILLS: 0
COUGH: 0
HEMATURIA: 0
DYSURIA: 0
JOINT SWELLING: 0
SHORTNESS OF BREATH: 0
HEADACHES: 0
PARESTHESIAS: 0
BREAST MASS: 0
MYALGIAS: 0
EYE PAIN: 0
PALPITATIONS: 0
ARTHRALGIAS: 0
FREQUENCY: 0
ABDOMINAL PAIN: 0
HEMATOCHEZIA: 0
DIARRHEA: 0
HEARTBURN: 0
NAUSEA: 0
CONSTIPATION: 0
DIZZINESS: 1
SORE THROAT: 0
NERVOUS/ANXIOUS: 1
WEAKNESS: 0

## 2023-10-10 ASSESSMENT — ACTIVITIES OF DAILY LIVING (ADL): CURRENT_FUNCTION: NO ASSISTANCE NEEDED

## 2023-10-10 ASSESSMENT — PAIN SCALES - GENERAL: PAINLEVEL: NO PAIN (0)

## 2023-10-10 NOTE — PROGRESS NOTES
"SUBJECTIVE:   Carla is a 68 year old who presents for Preventive Visit.      10/10/2023    10:09 AM   Additional Questions   Roomed by Ingrid SHELDON       Are you in the first 12 months of your Medicare coverage?  No    Healthy Habits:     In general, how would you rate your overall health?  Good    Frequency of exercise:  4-5 days/week    Duration of exercise:  30-45 minutes    Do you usually eat at least 4 servings of fruit and vegetables a day, include whole grains    & fiber and avoid regularly eating high fat or \"junk\" foods?  Yes    Taking medications regularly:  Yes    Medication side effects:  Not applicable    Ability to successfully perform activities of daily living:  No assistance needed    Home Safety:  No safety concerns identified    Hearing Impairment:  No hearing concerns    In the past 6 months, have you been bothered by leaking of urine?  No    In general, how would you rate your overall mental or emotional health?  Good    Additional concerns today:  No    Patient has some moles along her bra line that she would like checked and possibly removed.     Today's PHQ-2 Score:       10/10/2023     9:57 AM   PHQ-2 ( 1999 Pfizer)   Q1: Little interest or pleasure in doing things 0   Q2: Feeling down, depressed or hopeless 0   PHQ-2 Score 0   Q1: Little interest or pleasure in doing things Not at all   Q2: Feeling down, depressed or hopeless Not at all   PHQ-2 Score 0           Have you ever done Advance Care Planning? (For example, a Health Directive, POLST, or a discussion with a medical provider or your loved ones about your wishes): Yes, advance care planning is on file.       Fall risk  Fallen 2 or more times in the past year?: No  Any fall with injury in the past year?: No    Cognitive Screening   1) Repeat 3 items (Leader, Season, Table)    2) Clock draw: NORMAL  3) 3 item recall: Recalls 3 objects  Results: 3 items recalled: COGNITIVE IMPAIRMENT LESS LIKELY    Mini-CogTM Copyright PITO Quesada. Licensed by " the author for use in [facility]; reprinted with permission ([email or web address]). All rights reserved.      Do you have sleep apnea, excessive snoring or daytime drowsiness? : no    Reviewed and updated as needed this visit by clinical staff                  Reviewed and updated as needed this visit by Provider                 Social History     Tobacco Use    Smoking status: Never    Smokeless tobacco: Never   Substance Use Topics    Alcohol use: Yes     Comment: 1-2xweek             10/7/2023     3:09 PM   Alcohol Use   Prescreen: >3 drinks/day or >7 drinks/week? Yes   AUDIT SCORE  4         10/7/2023     3:09 PM   AUDIT - Alcohol Use Disorders Identification Test - Reproduced from the World Health Organization Audit 2001 (Second Edition)   1.  How often do you have a drink containing alcohol? 2 to 3 times a week   2.  How many drinks containing alcohol do you have on a typical day when you are drinking? 1 or 2   3.  How often do you have five or more drinks on one occasion? Less than monthly   4.  How often during the last year have you found that you were not able to stop drinking once you had started? Never   5.  How often during the last year have you failed to do what was normally expected of you because of drinking? Never   6.  How often during the last year have you needed a first drink in the morning to get yourself going after a heavy drinking session? Never   7.  How often during the last year have you had a feeling of guilt or remorse after drinking? Never   8.  How often during the last year have you been unable to remember what happened the night before because of your drinking? Never   9.  Have you or someone else been injured because of your drinking? No   10. Has a relative, friend, doctor or other health care worker been concerned about your drinking or suggested you cut down? No   TOTAL SCORE 4     Do you have a current opioid prescription? No  Do you use any other controlled substances or  medications that are not prescribed by a provider? None          Current providers sharing in care for this patient include:   Patient Care Team:  Clinic - [facility] as PCP - Henrry Soto MD as MD (Oncology)  Citlaly Flower APRN CNM as Certified Nurse Midwife (Midwives)  Tiesha Null MD as Assigned PCP  Pippa Carson MD as MD (Ophthalmology)  Pippa Casron MD as Assigned Surgical Provider  Lavinia Sanderson MD as Assigned OBGYN Provider    The following health maintenance items are reviewed in Epic and correct as of today:  Health Maintenance   Topic Date Due    DTAP/TDAP/TD IMMUNIZATION (2 - Td or Tdap) 04/26/2021    MEDICARE ANNUAL WELLNESS VISIT  08/17/2023    ANNUAL REVIEW OF HM ORDERS  08/17/2023    INFLUENZA VACCINE (1) 09/01/2023    COVID-19 Vaccine (5 - 2023-24 season) 09/01/2023    FALL RISK ASSESSMENT  10/10/2024    MAMMO SCREENING  10/17/2024    DEXA  08/10/2025    LIPID  08/17/2027    ADVANCE CARE PLANNING  08/17/2027    COLORECTAL CANCER SCREENING  09/16/2031    PHQ-2 (once per calendar year)  Completed    Pneumococcal Vaccine: 65+ Years  Completed    ZOSTER IMMUNIZATION  Completed    IPV IMMUNIZATION  Aged Out    HPV IMMUNIZATION  Aged Out    MENINGITIS IMMUNIZATION  Aged Out    HEPATITIS C SCREENING  Discontinued    PAP  Discontinued           FHS-7:       9/22/2021     9:02 AM 10/4/2021     1:44 PM 8/17/2022    11:35 AM 10/7/2023     3:11 PM   Breast CA Risk Assessment (FHS-7)   Did any of your first-degree relatives have breast or ovarian cancer? Yes Yes Yes Yes   Did any of your relatives have bilateral breast cancer? No No No No   Did any man in your family have breast cancer? No No No No   Did any woman in your family have breast and ovarian cancer? No No Yes Yes   Did any woman in your family have breast cancer before age 50 y? No No No No   Do you have 2 or more relatives with breast and/or ovarian cancer? No No No No   Do you have 2 or  "more relatives with breast and/or bowel cancer?  No No No       Mammogram Screening: Recommended mammography every 1-2 years with patient discussion and risk factor consideration  Pertinent mammograms are reviewed under the imaging tab.    Review of Systems   Constitutional:  Negative for chills and fever.   HENT:  Negative for congestion, ear pain, hearing loss and sore throat.    Eyes:  Negative for pain and visual disturbance.   Respiratory:  Negative for cough and shortness of breath.    Cardiovascular:  Negative for chest pain, palpitations and peripheral edema.   Gastrointestinal:  Negative for abdominal pain, constipation, diarrhea, heartburn, hematochezia and nausea.   Breasts:  Negative for tenderness, breast mass and discharge.   Genitourinary:  Negative for dysuria, frequency, genital sores, hematuria, pelvic pain, urgency, vaginal bleeding and vaginal discharge.   Musculoskeletal:  Negative for arthralgias, joint swelling and myalgias.   Skin:  Negative for rash.   Neurological:  Positive for dizziness. Negative for weakness, headaches and paresthesias.   Psychiatric/Behavioral:  Negative for mood changes. The patient is nervous/anxious.        OBJECTIVE:   LMP 03/13/2007  Estimated body mass index is 22.4 kg/m  as calculated from the following:    Height as of 10/26/22: 1.702 m (5' 7\").    Weight as of 8/17/22: 64.9 kg (143 lb).  Physical Exam  GENERAL: healthy, alert and no distress  EYES: Eyes grossly normal to inspection, PERRL and conjunctivae and sclerae normal  HENT: ear canals and TM's normal, nose and mouth without ulcers or lesions  NECK: no adenopathy, no asymmetry, masses, or scars and thyroid normal to palpation  RESP: lungs clear to auscultation - no rales, rhonchi or wheezes  CV: regular rate and rhythm, normal S1 S2, no S3 or S4, no murmur, click or rub, no peripheral edema and peripheral pulses strong  MS: no gross musculoskeletal defects noted, no edema  SKIN: mult skin lesions to back  "   NEURO: Normal strength and tone, mentation intact and speech normal  PSYCH: mentation appears normal, affect normal/bright    Diagnostic Test Results:  Labs reviewed in Epic  No results found for any visits on 10/10/23.    ASSESSMENT / PLAN:   Carla was seen today for physical.    Diagnoses and all orders for this visit:    Annual physical exam  -     REVIEW OF HEALTH MAINTENANCE PROTOCOL ORDERS  -     Primary Care - Care Coordination Referral; Future  -     Comprehensive metabolic panel (BMP + Alb, Alk Phos, ALT, AST, Total. Bili, TP); Future  -     TSH with free T4 reflex; Future  -     Lipid panel reflex to direct LDL Fasting; Future  -     Lipid panel reflex to direct LDL Fasting  -     TSH with free T4 reflex  -     Comprehensive metabolic panel (BMP + Alb, Alk Phos, ALT, AST, Total. Bili, TP)    Skin lesion  -     Adult Dermatology Referral; Future    Encounter for Medicare annual wellness exam    Other orders  -     INFLUENZA VACCINE 65+ (FLUZONE HD)  -     COVID-19 12+ (2023-24) (PFIZER)  -     PRIMARY CARE FOLLOW-UP SCHEDULING; Future        Patient has been advised of split billing requirements and indicates understanding: Yes      COUNSELING:  Reviewed preventive health counseling, as reflected in patient instructions       Regular exercise       Healthy diet/nutrition       Vision screening       Hearing screening       Dental care       Bladder control       Fall risk prevention       Aspirin prophylaxis        Alcohol Use        Folic Acid       Osteoporosis prevention/bone health       Colon cancer screening       (Korin)menopause management        She reports that she has never smoked. She has never used smokeless tobacco.      Appropriate preventive services were discussed with this patient, including applicable screening as appropriate for fall prevention, nutrition, physical activity, Tobacco-use cessation, weight loss and cognition.  Checklist reviewing preventive services available has been  given to the patient.    Reviewed patients plan of care and provided an AVS. The Basic Care Plan (routine screening as documented in Health Maintenance) for Carla meets the Care Plan requirement. This Care Plan has been established and reviewed with the Patient.          TRUDY [facility]    Identified Health Risks:  I have reviewed Opioid Use Disorder and Substance Use Disorder risk factors and made any needed referrals.

## 2023-10-10 NOTE — PATIENT INSTRUCTIONS
Patient Education   Personalized Prevention Plan  You are due for the preventive services outlined below.  Your care team is available to assist you in scheduling these services.  If you have already completed any of these items, please share that information with your care team to update in your medical record.  Health Maintenance Due   Topic Date Due     Diptheria Tetanus Pertussis (DTAP/TDAP/TD) Vaccine (2 - Td or Tdap) 04/26/2021     Annual Wellness Visit  08/17/2023

## 2023-10-12 ENCOUNTER — PATIENT OUTREACH (OUTPATIENT)
Dept: CARE COORDINATION | Facility: CLINIC | Age: 68
End: 2023-10-12
Payer: COMMERCIAL

## 2023-10-12 NOTE — PROGRESS NOTES
Clinic Care Coordination Contact  Community Health Worker Initial Outreach    CHW Initial Information Gathering:  Referral Source:  (Becky Alan, APRN CNP)  Preferred Hospital: [place], [place]  [phone]  Preferred Urgent Care: [facility], [phone]  Informal Support system:: Family  No PCP office visit in Past Year: No  Transportation means:: Regular car  CHW Additional Questions  If ED/Hospital discharge, follow-up appointment scheduled as recommended?: N/A  Medication changes made following ED/Hospital discharge?: N/A  MyChart active?: Yes  Patient sent Social Determinants of Health questionnaire?: Yes    Patient accepts CC: Yes. Patient scheduled for assessment with  CC on 10/16 at 11am. Patient noted desire to discuss  and review resources to help with the care of her disabled son .     Patient stated [facility] moved to [place] [facility]  Community Health Worker  [facility] Care Coordination  [facility]  [email or web address]   Office: [phone]

## 2023-10-16 ENCOUNTER — PATIENT OUTREACH (OUTPATIENT)
Dept: NURSING | Facility: CLINIC | Age: 68
End: 2023-10-16
Payer: COMMERCIAL

## 2023-10-16 ASSESSMENT — ACTIVITIES OF DAILY LIVING (ADL): DEPENDENT_IADLS:: INDEPENDENT

## 2023-10-16 NOTE — PROGRESS NOTES
Clinic Care Coordination Contact    Order: Patient/Caregiver Support; Resources for Support; Would like to review resources to help with the care of her disabled son.    CHW: Patient noted desire to discuss and review resources to help with the care of her disabled son. Patient stated they moved to [place] and will now go to the [facility].    PCP OV 10/10/23: No additional info regarding CC referral noted.    -------------------------------------------------------    SW CC outreach to pt for initial assessment per CHW scheduling. Pt requested a call back at 1 pm. SW CC returned call to pt.     Pt discussed her son's current situation and needs. Pt stated her son is 29 yo, has a lot of health issues, had brain cancer at 16, 26 surgeries since, had a grand mal seizure in Sept, cardiac issues about 2 years ago, ongoing issues. He is on SSDI. Has ADD which makes him really susceptible to addiction. He has been abusing alcohol due to boredom she thinks. He has gone to 2 OP treatments. Most recently, he went to [facility]'s residential program in [place] [facility] for 30 days this summer in July. She does not think he has really stopped drinking, even though they are together a lot and they have taken all alcohol out of home, but somehow he gets it, does not have drivers license.     This behaviors is a constant worry for her. Stated she is she is a worrier anyway, but worried about his and her future. The family made him sign a contract when he returned home from treatment, but has not followed through on anything it seems. She has another son who is , very successful, has kids; he is hard on other son.     Stated that everyone loves him, great kid, not violent, well mannered and well spoken,. They haven't shared his recent treatment stint with others because embarrassed, worried. This has all caused a strain on her marriage and family. Son doesn't seem to understand ramifications, can't just have 1 beer, [place]. Stated he needs direction that can't come from mom anymore. She takes care of MA applications, medication set up, etc everything. Stated it was very hard to drop him off a treatment - scared for him, older men. SHAHRZAD SEYMOUR clarified that treatment didn't set up any follow up care.     Pt stated that she is retired, [place], he is living with them. senior care was a hard transition for her to adjust,  adjusted quickly, she loved her work, pandemic expedited her long term.     SHAHRZAD SEYMOUR inquired about what resources could be helpful. Suggested family therapy resources, pt stated they are already very open and honest and communicative with each other. She is worried about future, not sure if he can stay there much longer. Discussed most of the time, 90% of the time, things are good. SHAHRZAD SEYMOUR suggested having a plan for bad days, even if most are good, to lean on, creating a recovery community for pt outside of family.       -get on housing wait lists   -have a therapist or someone to call for him  -OP MAYELA resources - doesn't want to go back to last place  -have a bad day or crisis day plan - pt agreed this would be a good start     SHAHRZAD SEYMOUR confirmed her son does have a [facility] , likely mental health . The issue is that they change very frequently and it is hard to get a call back. Stated they used to live in [facility] and had more resources there it seemed.     SHAHRZAD SEYMOUR discussed that any intervention or resource does depend on son's willingness to accept help and participate. Pt understood.     SHAHRZAD SEYMOUR asked if pt would like a therapist for herself. Pt is open to this.      Ok to send on [employer].     Plan: No further outreaches will be made at this time unless a new referral is made or a change in the pt's status occurs.     Patient was provided with this writer's contact information and encouraged to call with any questions or concerns.     SHAHRZAD SEYMOUR  sent resources via [employer].     Deloris Velazquez [facility]   Social Work Primary Care Clinic Care Coordinator   [facility]  [phone]  [email or web address]

## 2023-11-14 ENCOUNTER — HOSPITAL ENCOUNTER (OUTPATIENT)
Dept: MAMMOGRAPHY | Facility: CLINIC | Age: 68
Discharge: HOME | End: 2023-11-14
Attending: FAMILY MEDICINE | Admitting: FAMILY MEDICINE
Payer: MEDICARE

## 2023-11-14 DIAGNOSIS — Z12.31 VISIT FOR SCREENING MAMMOGRAM: ICD-10-CM

## 2023-11-14 PROCEDURE — 77067 SCR MAMMO BI INCL CAD: CPT

## 2023-11-17 ENCOUNTER — VIRTUAL VISIT (OUTPATIENT)
Dept: INTERNAL MEDICINE | Facility: CLINIC | Age: 68
End: 2023-11-17
Payer: COMMERCIAL

## 2023-11-17 DIAGNOSIS — J01.01 ACUTE RECURRENT MAXILLARY SINUSITIS: Primary | ICD-10-CM

## 2023-11-17 PROCEDURE — 99213 OFFICE O/P EST LOW 20 MIN: CPT | Mod: VID | Performed by: INTERNAL MEDICINE

## 2023-11-17 NOTE — PROGRESS NOTES
Carla is a 68 year old who is being evaluated via a billable video visit.      How would you like to obtain your AVS? MyChart  If the video visit is dropped, the invitation should be resent by: Text to cell phone: [phone]  Will anyone else be joining your video visit? No    Assessment & Plan   Acute recurrent maxillary sinusitis  Symptoms worsening. History of recurrent sinusitis. Will treat with course of Augmentin. If no improvement or symptoms worsen, recommend in-person evaluation with any available provider.  - amoxicillin-clavulanate (AUGMENTIN) 875-125 MG tablet; Take 1 tablet by mouth 2 times daily for 5 days    F/u with regular PCP at regular interval or sooner PRN    Chaka Cotton MD  [facility]    Subjective   Carla is a 68 year old who presents for a same day acute care virtual video visit with chief concern of:  Sinus Problem (Congestion, headaches X 5-6 days )  This is the first time I have met Carla, who typically gets care at clinics closer to her residence.    HPI   Six days of green drainage from sinuses. Teeth hurt. Sinus pressure. Headaches. Denies f/c.    Review of Systems   Constitutional, HEENT systems are negative, except as otherwise noted.      Objective       Vitals: No vitals were obtained today due to virtual visit.    Physical Exam   GENERAL: Healthy, alert and no distress  EYES: Eyes grossly normal to inspection.  No discharge or erythema, or obvious scleral/conjunctival abnormalities.  RESP: No audible wheeze, cough, or visible cyanosis.  No visible retractions or increased work of breathing.    SKIN: Visible skin clear. No significant rash, abnormal pigmentation or lesions.  NEURO: Cranial nerves grossly intact.  Mentation and speech appropriate for age.  PSYCH: Mentation appears normal, affect normal/bright, judgement and insight intact, normal speech and appearance well-groomed.    Video-Visit Details  Type of service: Video Visit   Video Start  Time: 1:52 PM  Video End Time: 1:55 PM  Originating Location (pt. Location): Home  Distant Location (provider location):  Off-site  Platform used for Video Visit: Solo

## 2023-12-14 ENCOUNTER — OFFICE VISIT (OUTPATIENT)
Dept: DERMATOLOGY | Facility: CLINIC | Age: 68
End: 2023-12-14
Payer: COMMERCIAL

## 2023-12-14 DIAGNOSIS — L81.4 LENTIGO: ICD-10-CM

## 2023-12-14 DIAGNOSIS — L40.9 SCALP PSORIASIS: Primary | ICD-10-CM

## 2023-12-14 DIAGNOSIS — D23.9 DERMATOFIBROMA: ICD-10-CM

## 2023-12-14 DIAGNOSIS — D18.01 CHERRY ANGIOMA: ICD-10-CM

## 2023-12-14 DIAGNOSIS — L82.1 SEBORRHEIC KERATOSIS: ICD-10-CM

## 2023-12-14 DIAGNOSIS — D22.9 MULTIPLE BENIGN NEVI: ICD-10-CM

## 2023-12-14 PROCEDURE — 99203 OFFICE O/P NEW LOW 30 MIN: CPT | Performed by: PHYSICIAN ASSISTANT

## 2023-12-14 RX ORDER — CLOBETASOL PROPIONATE 0.5 MG/ML
SOLUTION TOPICAL
Qty: 50 ML | Refills: 4 | Status: SHIPPED | OUTPATIENT
Start: 2023-12-14 | End: 2023-12-18

## 2023-12-14 ASSESSMENT — PAIN SCALES - GENERAL: PAINLEVEL: NO PAIN (0)

## 2023-12-14 NOTE — LETTER
12/14/2023         RE: Carla Hong  [address] [facility] [address]        Dear Colleague,    Thank you for referring your patient, Carla Hong, to the [facility]. Please see a copy of my visit note below.    Carla Hong is an extremely pleasant 68 year old year old female patient here today for skin check. She denies any blistering sunburns, and currently good with wearing sunscreen.   Patient has no other skin complaints today.  Remainder of the HPI, Meds, PMH, Allergies, FH, and SH was reviewed in chart.    Pertinent Hx:   No personal history of skin cancer.   Past Medical History:   Diagnosis Date     Vulvar cancer (H) 11/2012       Past Surgical History:   Procedure Laterality Date     BUNIONECTOMY Right 12/18/2018    Procedure: 1.  Lapidus bunionectomy right foot  2. hammertoe correction third toe right foot;  Surgeon: Saqib Interiano DPM;  Location: [facility]     COLONOSCOPY  12/06/2010    COLONOSCOPY performed by RYAN OSEGUERA at [facility]     COLONOSCOPY N/A 09/16/2021    Procedure: COLONOSCOPY;  Surgeon: Singh Horta MD;  Location: [facility]     MAMMOPLASTY REDUCTION       MAMMOPLASTY REDUCTION BILATERAL Bilateral 12/02/2020    Procedure: MAMMOPLASTY, REDUCTION, BILATERAL;  Surgeon: MOLLY Kang MD;  Location: [facility]     VULVECTOMY RADICAL DISSECT GROIN(S)  11/16/2012    Procedure: VULVECTOMY RADICAL DISSECT GROIN(S);  Radical Vulvectomy Bilateral Inginal Femoral Lymph Node Dissection.;  Surgeon: Henrry Granger MD;  Location:  OR        Family History   Problem Relation Age of Onset     Diabetes Maternal Grandmother      Cancer Mother         renal cell carcinoma     Cancer Son         brain cancer/germinoma     Hypertension No family hx of      Cerebrovascular Disease No family hx of      Breast Cancer No family hx of      Cancer - colorectal No family hx of      Prostate Cancer No family hx of      C.A.D. No family hx of         Social History     Socioeconomic History     Marital status:      Spouse name: Not on file     Number of children: Not on file     Years of education: Not on file     Highest education level: Not on file   Occupational History     Not on file   Tobacco Use     Smoking status: Never     Smokeless tobacco: Never   Vaping Use     Vaping Use: Never used   Substance and Sexual Activity     Alcohol use: Yes     Comment: 1-2xweek     Drug use: Never     Sexual activity: Yes     Partners: Male     Birth control/protection: None     Comment: Vasectomy   Other Topics Concern     Parent/sibling w/ CABG, MI or angioplasty before 65F 55M? No   Social History Narrative     Not on file     Social Determinants of Health     Financial Resource Strain: Low Risk  (10/14/2023)    Financial Resource Strain      Within the past 12 months, have you or your family members you live with been unable to get utilities (heat, electricity) when it was really needed?: No   Food Insecurity: Low Risk  (10/14/2023)    Food Insecurity      Within the past 12 months, did you worry that your food would run out before you got money to buy more?: No      Within the past 12 months, did the food you bought just not last and you didn t have money to get more?: No   Transportation Needs: Low Risk  (10/14/2023)    Transportation Needs      Within the past 12 months, has lack of transportation kept you from medical appointments, getting your medicines, non-medical meetings or appointments, work, or from getting things that you need?: No   Physical Activity: Sufficiently Active (10/14/2023)    Exercise Vital Sign      Days of Exercise per Week: 4 days      Minutes of Exercise per Session: 60 min   Stress: No Stress Concern Present (10/14/2023)    [place] [employer] - Occupational Stress Questionnaire      Feeling of Stress : Only a little   Social Connections: Socially Integrated (10/14/2023)    Social Connection and Isolation  Panel [NHANES]      Frequency of Communication with Friends and Family: More than three times a week      Frequency of Social Gatherings with Friends and Family: More than three times a week      Attends Catholic Services: More than 4 times per year      Active Member of Clubs or Organizations: Yes      Attends Club or Organization Meetings: More than 4 times per year      Marital Status:    Interpersonal Safety: Low Risk  (10/10/2023)    Interpersonal Safety      Do you feel physically and emotionally safe where you currently live?: Yes      Within the past 12 months, have you been hit, slapped, kicked or otherwise physically hurt by someone?: No      Within the past 12 months, have you been humiliated or emotionally abused in other ways by your partner or ex-partner?: No   Housing Stability: Low Risk  (10/14/2023)    Housing Stability      Do you have housing? : Yes      Are you worried about losing your housing?: No       Outpatient Encounter Medications as of 12/14/2023   Medication Sig Dispense Refill     CALCIUM + D PO 1 daily       clobetasol (TEMOVATE) 0.05 % external solution Apply twice daily as needed to rash on scalp 50 mL 4     Multiple Vitamin (DAILY MULTIVITAMIN PO) Take 1 tablet by mouth daily.       fluticasone (FLONASE) 50 MCG/ACT nasal spray Spray 1 spray into both nostrils daily (Patient not taking: Reported on 12/14/2023) 9 mL 0     MELATONIN PO Take 10 mg by mouth (Patient not taking: Reported on 12/14/2023)       valACYclovir (VALTREX) 1000 mg tablet TAKE 2 TABLETS (2,000 MG) BY MOUTH 2 TIMES DAILY FOR 1 DAY KEEP EXTRA TABLETS ON HAND IN CASE OF ANOTHER OUTBREAK (Patient not taking: Reported on 11/17/2023) 8 tablet 2     Facility-Administered Encounter Medications as of 12/14/2023   Medication Dose Route Frequency Provider Last Rate Last Admin     sodium chloride (PF) 0.9% PF flush 70 mL  70 mL Intravenous Once Elin Romano, TRUDY CNP                 O:   NAD, WDWN, Alert &  Oriented, Mood & Affect wnl, Vitals stable   Here today alone   LMP 03/13/2007    General appearance normal   Vitals stable   Alert, oriented and in no acute distress      Psoriasiform plaques on scalp   Milia on forehead   Firm papules with positive dimple sign on left leg  x 2  Stuck on papules and brown macules on trunk and ext   Red papules on trunk  Brown papules and macules with regular pigment network and borders on torso and extretmites     The remainder of skin exam is normal       Eyes: Conjunctivae/lids:Normal     ENT: Lips: normal    MSK:Normal    Cardiovascular: peripheral edema none    Pulm: Breathing Normal    Neuro/Psych: Orientation:Alert and Orientedx3 ; Mood/Affect:normal      A/P:  1. Seborrheic keratosis, lentigo, angioma, benign nevi, dermatofibroma, milia   It was a pleasure speaking to Carla Hong today.  BENIGN LESIONS DISCUSSED WITH PATIENT:  I discussed the specifics of tumor, prognosis, and genetics of benign lesions.  I explained that treatment of these lesions would be purely cosmetic and not medically neccessary.  I discussed with patient different removal options including excision, cautery and /or laser.      Nature and genetics of benign skin lesions dicussed with patient.  Signs and Symptoms of skin cancer discussed with patient.  ABCDEs of melanoma reviewed with patient.  Patient encouraged to perform monthly skin exams.  UV precautions reviewed with patient.  Risks of non-melanoma skin cancer discussed with patient   Return to clinic in one year if needed.       Again, thank you for allowing me to participate in the care of your patient.        Sincerely,        Baylee Contreras PA-C

## 2023-12-15 NOTE — PROGRESS NOTES
Carla Hong is an extremely pleasant 68 year old year old female patient here today for skin check. She denies any blistering sunburns, and currently good with wearing sunscreen.   Patient has no other skin complaints today.  Remainder of the HPI, Meds, PMH, Allergies, FH, and SH was reviewed in chart.    Pertinent Hx:   No personal history of skin cancer.   Past Medical History:   Diagnosis Date    Vulvar cancer (H) 11/2012       Past Surgical History:   Procedure Laterality Date    BUNIONECTOMY Right 12/18/2018    Procedure: 1.  Lapidus bunionectomy right foot  2. hammertoe correction third toe right foot;  Surgeon: Saqib Interiano DPM;  Location: [facility]    COLONOSCOPY  12/06/2010    COLONOSCOPY performed by RYAN OSEGUERA at [facility]    COLONOSCOPY N/A 09/16/2021    Procedure: COLONOSCOPY;  Surgeon: Singh Horta MD;  Location: [facility]    MAMMOPLASTY REDUCTION      MAMMOPLASTY REDUCTION BILATERAL Bilateral 12/02/2020    Procedure: MAMMOPLASTY, REDUCTION, BILATERAL;  Surgeon: MOLLY Kang MD;  Location: [facility]    VULVECTOMY RADICAL DISSECT GROIN(S)  11/16/2012    Procedure: VULVECTOMY RADICAL DISSECT GROIN(S);  Radical Vulvectomy Bilateral Inginal Femoral Lymph Node Dissection.;  Surgeon: Henrry Granger MD;  Location:  OR        Family History   Problem Relation Age of Onset    Diabetes Maternal Grandmother     Cancer Mother         renal cell carcinoma    Cancer Son         brain cancer/germinoma    Hypertension No family hx of     Cerebrovascular Disease No family hx of     Breast Cancer No family hx of     Cancer - colorectal No family hx of     Prostate Cancer No family hx of     C.A.D. No family hx of        Social History     Socioeconomic History    Marital status:      Spouse name: Not on file    Number of children: Not on file    Years of education: Not on file    Highest education level: Not on file   Occupational History    Not on file   Tobacco Use    Smoking status:  Never    Smokeless tobacco: Never   Vaping Use    Vaping Use: Never used   Substance and Sexual Activity    Alcohol use: Yes     Comment: 1-2xweek    Drug use: Never    Sexual activity: Yes     Partners: Male     Birth control/protection: None     Comment: Vasectomy   Other Topics Concern    Parent/sibling w/ CABG, MI or angioplasty before 65F 55M? No   Social History Narrative    Not on file     Social Determinants of Health     Financial Resource Strain: Low Risk  (10/14/2023)    Financial Resource Strain     Within the past 12 months, have you or your family members you live with been unable to get utilities (heat, electricity) when it was really needed?: No   Food Insecurity: Low Risk  (10/14/2023)    Food Insecurity     Within the past 12 months, did you worry that your food would run out before you got money to buy more?: No     Within the past 12 months, did the food you bought just not last and you didn t have money to get more?: No   Transportation Needs: Low Risk  (10/14/2023)    Transportation Needs     Within the past 12 months, has lack of transportation kept you from medical appointments, getting your medicines, non-medical meetings or appointments, work, or from getting things that you need?: No   Physical Activity: Sufficiently Active (10/14/2023)    Exercise Vital Sign     Days of Exercise per Week: 4 days     Minutes of Exercise per Session: 60 min   Stress: No Stress Concern Present (10/14/2023)    [place] [employer] Occupational Health - Occupational Stress Questionnaire     Feeling of Stress : Only a little   Social Connections: Socially Integrated (10/14/2023)    Social Connection and Isolation Panel [NHANES]     Frequency of Communication with Friends and Family: More than three times a week     Frequency of Social Gatherings with Friends and Family: More than three times a week     Attends Taoism Services: More than 4 times per year     Active Member of Clubs or Organizations: Yes      Attends Club or Organization Meetings: More than 4 times per year     Marital Status:    Interpersonal Safety: Low Risk  (10/10/2023)    Interpersonal Safety     Do you feel physically and emotionally safe where you currently live?: Yes     Within the past 12 months, have you been hit, slapped, kicked or otherwise physically hurt by someone?: No     Within the past 12 months, have you been humiliated or emotionally abused in other ways by your partner or ex-partner?: No   Housing Stability: Low Risk  (10/14/2023)    Housing Stability     Do you have housing? : Yes     Are you worried about losing your housing?: No       Outpatient Encounter Medications as of 12/14/2023   Medication Sig Dispense Refill    CALCIUM + D PO 1 daily      clobetasol (TEMOVATE) 0.05 % external solution Apply twice daily as needed to rash on scalp 50 mL 4    Multiple Vitamin (DAILY MULTIVITAMIN PO) Take 1 tablet by mouth daily.      fluticasone (FLONASE) 50 MCG/ACT nasal spray Spray 1 spray into both nostrils daily (Patient not taking: Reported on 12/14/2023) 9 mL 0    MELATONIN PO Take 10 mg by mouth (Patient not taking: Reported on 12/14/2023)      valACYclovir (VALTREX) 1000 mg tablet TAKE 2 TABLETS (2,000 MG) BY MOUTH 2 TIMES DAILY FOR 1 DAY KEEP EXTRA TABLETS ON HAND IN CASE OF ANOTHER OUTBREAK (Patient not taking: Reported on 11/17/2023) 8 tablet 2     Facility-Administered Encounter Medications as of 12/14/2023   Medication Dose Route Frequency Provider Last Rate Last Admin    sodium chloride (PF) 0.9% PF flush 70 mL  70 mL Intravenous Once Elin Romano APRN CNP                 O:   NAD, WDWN, Alert & Oriented, Mood & Affect wnl, Vitals stable   Here today alone   LMP 03/13/2007    General appearance normal   Vitals stable   Alert, oriented and in no acute distress      Psoriasiform plaques on scalp   Milia on forehead   Firm papules with positive dimple sign on left leg  x 2  Stuck on papules and brown macules on  trunk and ext   Red papules on trunk  Brown papules and macules with regular pigment network and borders on torso and extretmites     The remainder of skin exam is normal       Eyes: Conjunctivae/lids:Normal     ENT: Lips: normal    MSK:Normal    Cardiovascular: peripheral edema none    Pulm: Breathing Normal    Neuro/Psych: Orientation:Alert and Orientedx3 ; Mood/Affect:normal      A/P:  1. Seborrheic keratosis, lentigo, angioma, benign nevi, dermatofibroma, milia   It was a pleasure speaking to Carla Hong today.  BENIGN LESIONS DISCUSSED WITH PATIENT:  I discussed the specifics of tumor, prognosis, and genetics of benign lesions.  I explained that treatment of these lesions would be purely cosmetic and not medically neccessary.  I discussed with patient different removal options including excision, cautery and /or laser.      Nature and genetics of benign skin lesions dicussed with patient.  Signs and Symptoms of skin cancer discussed with patient.  ABCDEs of melanoma reviewed with patient.  Patient encouraged to perform monthly skin exams.  UV precautions reviewed with patient.  Risks of non-melanoma skin cancer discussed with patient   Return to clinic in one year if needed.

## 2024-01-04 ENCOUNTER — E-VISIT (OUTPATIENT)
Dept: URGENT CARE | Facility: CLINIC | Age: 69
End: 2024-01-04
Payer: COMMERCIAL

## 2024-01-04 ENCOUNTER — VIRTUAL VISIT (OUTPATIENT)
Dept: FAMILY MEDICINE | Facility: CLINIC | Age: 69
End: 2024-01-04
Payer: COMMERCIAL

## 2024-01-04 DIAGNOSIS — J06.9 VIRAL URI: ICD-10-CM

## 2024-01-04 DIAGNOSIS — R09.81 NASAL CONGESTION: ICD-10-CM

## 2024-01-04 DIAGNOSIS — J01.90 ACUTE SINUSITIS WITH SYMPTOMS > 10 DAYS: Primary | ICD-10-CM

## 2024-01-04 PROCEDURE — 99421 OL DIG E/M SVC 5-10 MIN: CPT | Performed by: PHYSICIAN ASSISTANT

## 2024-01-04 PROCEDURE — 99213 OFFICE O/P EST LOW 20 MIN: CPT | Mod: 95 | Performed by: FAMILY MEDICINE

## 2024-01-04 RX ORDER — IPRATROPIUM BROMIDE 42 UG/1
2 SPRAY, METERED NASAL 4 TIMES DAILY
Qty: 15 ML | Refills: 0 | Status: SHIPPED | OUTPATIENT
Start: 2024-01-04

## 2024-01-04 RX ORDER — AZITHROMYCIN 250 MG/1
TABLET, FILM COATED ORAL
Qty: 6 TABLET | Refills: 0 | Status: SHIPPED | OUTPATIENT
Start: 2024-01-04 | End: 2024-01-09

## 2024-01-04 NOTE — PATIENT INSTRUCTIONS
Acute Sinusitis: Care Instructions  Overview     Acute sinusitis is an inflammation of the mucous membranes inside the nose and sinuses. Sinuses are the hollow spaces in your skull around the eyes and nose. Acute sinusitis often follows a cold. Acute sinusitis causes thick, discolored mucus that drains from the nose or down the back of the throat. It also can cause pain and pressure in your head and face along with a stuffy or blocked nose.  In most cases, sinusitis gets better on its own in 1 to 2 weeks. But some mild symptoms may last for several weeks. Sometimes antibiotics are needed if there is a bacterial infection.  Follow-up care is a key part of your treatment and safety. Be sure to make and go to all appointments, and call your doctor if you are having problems. It's also a good idea to know your test results and keep a list of the medicines you take.  How can you care for yourself at home?    Use saline (saltwater) nasal washes. This can help keep your nasal passages open and wash out mucus and allergens.  ? You can buy saline nose washes at a grocery store or drugstore. Follow the instructions on the package.  ? You can make your own at home. Add 1 teaspoon of non-iodized salt and 1 teaspoon of baking soda to 2 cups of distilled or boiled and cooled water. Fill a squeeze bottle or a nasal cleansing pot (such as a neti pot) with the nasal wash. Then put the tip into your nostril, and lean over the sink. With your mouth open, gently squirt the liquid. Repeat on the other side.    Try a decongestant nasal spray like oxymetazoline (Afrin). Do not use it for more than 3 days in a row. Using it for more than 3 days can make your congestion worse.    If needed, take an over-the-counter pain medicine, such as acetaminophen (Tylenol), ibuprofen (Advil, Motrin), or naproxen (Aleve). Read and follow all instructions on the label.    If the doctor prescribed antibiotics, take them as directed. Do not stop taking  "them just because you feel better. You need to take the full course of antibiotics.    Be careful when taking over-the-counter cold or flu medicines and Tylenol at the same time. Many of these medicines have acetaminophen, which is Tylenol. Read the labels to make sure that you are not taking more than the recommended dose. Too much acetaminophen (Tylenol) can be harmful.    Try a steroid nasal spray. It may help with your symptoms.    Breathe warm, moist air. You can use a steamy shower, a hot bath, or a sink filled with hot water. Avoid cold, dry air. Using a humidifier in your home may help. Follow the directions for cleaning the machine.  When should you call for help?   Call your doctor now or seek immediate medical care if:      You have new or worse swelling, redness, or pain in your face or around one or both of your eyes.       You have double vision or a change in your vision.       You have a high fever.       You have a severe headache and a stiff neck.       You have mental changes, such as feeling confused or much less alert.   Watch closely for changes in your health, and be sure to contact your doctor if:      You are not getting better as expected.   Where can you learn more?  Go to https://[email or web address].net/patiented  Enter I933 in the search box to learn more about \"Acute Sinusitis: Care Instructions.\"  Current as of: February 28, 2023               Content Version: 13.8    9050-1066 [employer].   Care instructions adapted under license by your healthcare professional. If you have questions about a medical condition or this instruction, always ask your healthcare professional. [employer] disclaims any warranty or liability for your use of this information.       The symptoms you describe suggest a viral cause, which is much more common than a bacterial cause. Antibiotics will treat bacterial infections, but have no effect on viral infections. If possible, especially if " improving, start with symptom care for the first 7-10 days, then consider seeking further treatment or taking an antibiotic. Bacterial infections generally are more severe, including symptoms such as pus, fever over 101degrees F, or rapidly worsening.  You may want to try a nasal lavage (also known as nasal irrigation). You can find over-the-counter products, such as Neti-Pot, at retail locations or make your own at home. Instructions for homemade nasal lavage and more information on the process are available online at http://www.aafp.org/afp/2009/1115/p1121.html.

## 2024-01-04 NOTE — PROGRESS NOTES
Carla is a 68 year old who is being evaluated via a billable video visit.      How would you like to obtain your AVS? MyChart  If the video visit is dropped, the invitation should be resent by: Text to cell phone: [phone]  Will anyone else be joining your video visit? No          Assessment & Plan     Acute sinusitis with symptoms > 10 days    - azithromycin (ZITHROMAX) 250 MG tablet; Take 2 tablets (500 mg) by mouth daily for 1 day, THEN 1 tablet (250 mg) daily for 4 days.      20 minutes spent by me on the date of the encounter doing chart review, history and exam, documentation and further activities per the note           Mariluz Torres DO  [facility]    Subjective   Carla is a 68 year old, presenting for the following health issues:    Sinus Problem        1/4/2024     1:37 PM   Additional Questions   Roomed by Mariam PACHECO         1/4/2024     1:37 PM   Patient Reported Additional Medications   Patient reports taking the following new medications none       HPI     Acute Illness  Acute illness concerns: Possible Sinus infection  Onset/Duration: 12/24/23  Symptoms:  Fever: No  Chills/Sweats: YES  Headache (location?): YES  Sinus Pressure: YES  Conjunctivitis:  No  Ear Pain: no  Rhinorrhea: YES  Congestion: YES  Sore Throat: No  Cough: no  Wheeze: No  Decreased Appetite: No  Nausea: No  Vomiting: No  Diarrhea: No  Dysuria/Freq.: No  Dysuria or Hematuria: No  Fatigue/Achiness: YES- due to not sleeping  Sick/Strep Exposure: No  Therapies tried and outcome: Negative home covid test- dayquil, nyquil, nasal spray, tylenol, ibuprofen          Review of Systems   Constitutional, HEENT, cardiovascular, pulmonary, gi and gu systems are negative, except as otherwise noted.      Objective           Vitals:  No vitals were obtained today due to virtual visit.    Physical Exam   GENERAL: Healthy, alert and no distress  EYES: Eyes grossly normal to inspection.  No discharge or erythema, or obvious  scleral/conjunctival abnormalities.  RESP: No audible wheeze, cough, or visible cyanosis.  No visible retractions or increased work of breathing.    SKIN: Visible skin clear. No significant rash, abnormal pigmentation or lesions.  NEURO: Cranial nerves grossly intact.  Mentation and speech appropriate for age.  PSYCH: Mentation appears normal, affect normal/bright, judgement and insight intact, normal speech and appearance well-groomed.                Video-Visit Details    Type of service:  Video Visit   Video Start Time:   Video End Time:    Originating Location (pt. Location): Home    Distant Location (provider location):  On-site  Platform used for Video Visit: [employer]

## 2024-07-26 ENCOUNTER — VIRTUAL VISIT (OUTPATIENT)
Dept: PEDIATRICS | Facility: CLINIC | Age: 69
End: 2024-07-26
Payer: COMMERCIAL

## 2024-07-26 DIAGNOSIS — J01.90 ACUTE BACTERIAL SINUSITIS: Primary | ICD-10-CM

## 2024-07-26 DIAGNOSIS — B96.89 ACUTE BACTERIAL SINUSITIS: Primary | ICD-10-CM

## 2024-07-26 PROCEDURE — 99213 OFFICE O/P EST LOW 20 MIN: CPT | Mod: 95 | Performed by: INTERNAL MEDICINE

## 2024-07-26 NOTE — PATIENT INSTRUCTIONS

## 2024-07-26 NOTE — PROGRESS NOTES
Carla is a 69 year old who is being evaluated via a billable video visit.          Assessment & Plan     Acute bacterial sinusitis  Having moderate to severe symptoms including unrelenting tooth pain.  Will treat with antibiotics - advised risks/SE.  Continue supportive home treatments.  - amoxicillin-clavulanate (AUGMENTIN) 875-125 MG tablet; Take 1 tablet by mouth 2 times daily        See Patient Instructions    Subjective   Carla is a 69 year old, presenting for the following health issues:  No chief complaint on file.      Video Start Time: 11:03 AM    History of Present Illness       Reason for visit:  Sinus infection    She eats 2-3 servings of fruits and vegetables daily.She consumes 0 sweetened beverage(s) daily.She exercises with enough effort to increase her heart rate 30 to 60 minutes per day.  She exercises with enough effort to increase her heart rate 3 or less days per week.   She is taking medications regularly.             All other systems on a 10-point review are negative, unless otherwise noted in HPI        Objective           Vitals:  No vitals were obtained today due to virtual visit.    Physical Exam   GENERAL: alert and no distress  EYES: Eyes grossly normal to inspection.  No discharge or erythema, or obvious scleral/conjunctival abnormalities.  RESP: No audible wheeze, cough, or visible cyanosis.    SKIN: Visible skin clear. No significant rash, abnormal pigmentation or lesions.  NEURO: Cranial nerves grossly intact.  Mentation and speech appropriate for age.  PSYCH: Appropriate affect, tone, and pace of words          Video-Visit Details    Type of service:  Video Visit   Video End Time:11:08 AM  Originating Location (pt. Location): Home    Distant Location (provider location):  Off-site  Platform used for Video Visit: [employer]  Signed Electronically by: Irene Couch MD

## 2024-10-08 ENCOUNTER — LAB (OUTPATIENT)
Dept: LAB | Facility: CLINIC | Age: 69
End: 2024-10-08
Payer: COMMERCIAL

## 2024-10-08 DIAGNOSIS — E78.5 HYPERLIPIDEMIA LDL GOAL <70: ICD-10-CM

## 2024-10-08 LAB
CHOLEST SERPL-MCNC: 231 MG/DL
FASTING STATUS PATIENT QL REPORTED: YES
HDLC SERPL-MCNC: 80 MG/DL
LDLC SERPL CALC-MCNC: 131 MG/DL
NONHDLC SERPL-MCNC: 151 MG/DL
TRIGL SERPL-MCNC: 101 MG/DL

## 2024-10-08 PROCEDURE — 80061 LIPID PANEL: CPT

## 2024-10-08 PROCEDURE — 36415 COLL VENOUS BLD VENIPUNCTURE: CPT

## 2024-10-16 SDOH — HEALTH STABILITY: PHYSICAL HEALTH: ON AVERAGE, HOW MANY MINUTES DO YOU ENGAGE IN EXERCISE AT THIS LEVEL?: 60 MIN

## 2024-10-16 SDOH — HEALTH STABILITY: PHYSICAL HEALTH: ON AVERAGE, HOW MANY DAYS PER WEEK DO YOU ENGAGE IN MODERATE TO STRENUOUS EXERCISE (LIKE A BRISK WALK)?: 4 DAYS

## 2024-10-16 ASSESSMENT — SOCIAL DETERMINANTS OF HEALTH (SDOH): HOW OFTEN DO YOU GET TOGETHER WITH FRIENDS OR RELATIVES?: MORE THAN THREE TIMES A WEEK

## 2024-10-17 ENCOUNTER — OFFICE VISIT (OUTPATIENT)
Dept: FAMILY MEDICINE | Facility: CLINIC | Age: 69
End: 2024-10-17
Attending: NURSE PRACTITIONER
Payer: COMMERCIAL

## 2024-10-17 VITALS
DIASTOLIC BLOOD PRESSURE: 80 MMHG | SYSTOLIC BLOOD PRESSURE: 120 MMHG | TEMPERATURE: 97.4 F | HEART RATE: 66 BPM | RESPIRATION RATE: 20 BRPM | HEIGHT: 68 IN | OXYGEN SATURATION: 97 % | BODY MASS INDEX: 21.67 KG/M2 | WEIGHT: 143 LBS

## 2024-10-17 DIAGNOSIS — Z12.31 ENCOUNTER FOR SCREENING MAMMOGRAM FOR BREAST CANCER: ICD-10-CM

## 2024-10-17 DIAGNOSIS — J31.0 CHRONIC RHINITIS: ICD-10-CM

## 2024-10-17 DIAGNOSIS — Z23 NEED FOR TDAP VACCINATION: ICD-10-CM

## 2024-10-17 DIAGNOSIS — E78.5 HYPERLIPIDEMIA LDL GOAL <70: ICD-10-CM

## 2024-10-17 DIAGNOSIS — Z00.00 ENCOUNTER FOR MEDICARE ANNUAL WELLNESS EXAM: Primary | ICD-10-CM

## 2024-10-17 PROBLEM — J32.9 SINUS INFECTION: Status: ACTIVE | Noted: 2024-01-03

## 2024-10-17 PROCEDURE — 99213 OFFICE O/P EST LOW 20 MIN: CPT | Mod: 25

## 2024-10-17 PROCEDURE — G0439 PPPS, SUBSEQ VISIT: HCPCS

## 2024-10-17 PROCEDURE — G0008 ADMIN INFLUENZA VIRUS VAC: HCPCS

## 2024-10-17 PROCEDURE — 90480 ADMN SARSCOV2 VAC 1/ONLY CMP: CPT

## 2024-10-17 PROCEDURE — 90662 IIV NO PRSV INCREASED AG IM: CPT

## 2024-10-17 PROCEDURE — 91320 SARSCV2 VAC 30MCG TRS-SUC IM: CPT

## 2024-10-17 SDOH — HEALTH STABILITY: PHYSICAL HEALTH: ON AVERAGE, HOW MANY MINUTES DO YOU ENGAGE IN EXERCISE AT THIS LEVEL?: 60 MIN

## 2024-10-17 SDOH — HEALTH STABILITY: PHYSICAL HEALTH: ON AVERAGE, HOW MANY DAYS PER WEEK DO YOU ENGAGE IN MODERATE TO STRENUOUS EXERCISE (LIKE A BRISK WALK)?: 4 DAYS

## 2024-10-17 ASSESSMENT — PAIN SCALES - GENERAL: PAINLEVEL: NO PAIN (0)

## 2024-10-17 ASSESSMENT — SOCIAL DETERMINANTS OF HEALTH (SDOH): HOW OFTEN DO YOU GET TOGETHER WITH FRIENDS OR RELATIVES?: MORE THAN THREE TIMES A WEEK

## 2024-10-17 NOTE — PATIENT INSTRUCTIONS
Patient Education   Preventive Care Advice   This is general advice given by our system to help you stay healthy. However, your care team may have specific advice just for you. Please talk to your care team about your preventive care needs.  Nutrition  Eat 5 or more servings of fruits and vegetables each day.  Try wheat bread, brown rice and whole grain pasta (instead of white bread, rice, and pasta).  Get enough calcium and vitamin D. Check the label on foods and aim for 100% of the RDA (recommended daily allowance).  Lifestyle  Exercise at least 150 minutes each week  (30 minutes a day, 5 days a week).  Do muscle strengthening activities 2 days a week. These help control your weight and prevent disease.  No smoking.  Wear sunscreen to prevent skin cancer.  Have a dental exam and cleaning every 6 months.  Yearly exams  See your health care team every year to talk about:  Any changes in your health.  Any medicines your care team has prescribed.  Preventive care, family planning, and ways to prevent chronic diseases.  Shots (vaccines)   HPV shots (up to age 26), if you've never had them before.  Hepatitis B shots (up to age 59), if you've never had them before.  COVID-19 shot: Get this shot when it's due.  Flu shot: Get a flu shot every year.  Tetanus shot: Get a tetanus shot every 10 years.  Pneumococcal, hepatitis A, and RSV shots: Ask your care team if you need these based on your risk.  Shingles shot (for age 50 and up)  General health tests  Diabetes screening:  Starting at age 35, Get screened for diabetes at least every 3 years.  If you are younger than age 35, ask your care team if you should be screened for diabetes.  Cholesterol test: At age 39, start having a cholesterol test every 5 years, or more often if advised.  Bone density scan (DEXA): At age 50, ask your care team if you should have this scan for osteoporosis (brittle bones).  Hepatitis C: Get tested at least once in your life.  STIs (sexually  transmitted infections)  Before age 24: Ask your care team if you should be screened for STIs.  After age 24: Get screened for STIs if you're at risk. You are at risk for STIs (including HIV) if:  You are sexually active with more than one person.  You don't use condoms every time.  You or a partner was diagnosed with a sexually transmitted infection.  If you are at risk for HIV, ask about PrEP medicine to prevent HIV.  Get tested for HIV at least once in your life, whether you are at risk for HIV or not.  Cancer screening tests  Cervical cancer screening: If you have a cervix, begin getting regular cervical cancer screening tests starting at age 21.  Breast cancer scan (mammogram): If you've ever had breasts, begin having regular mammograms starting at age 40. This is a scan to check for breast cancer.  Colon cancer screening: It is important to start screening for colon cancer at age 45.  Have a colonoscopy test every 10 years (or more often if you're at risk) Or, ask your provider about stool tests like a FIT test every year or Cologuard test every 3 years.  To learn more about your testing options, visit:   .  For help making a decision, visit:   https://bit.ly/lz55729.  Prostate cancer screening test: If you have a prostate, ask your care team if a prostate cancer screening test (PSA) at age 55 is right for you.  Lung cancer screening: If you are a current or former smoker ages 50 to 80, ask your care team if ongoing lung cancer screenings are right for you.  For informational purposes only. Not to replace the advice of your health care provider. Copyright   2023 [facility]. All rights reserved. Clinically reviewed by the [facility]. HeyAnita [id number] - REV 01/24.  Eating Healthy Foods: Care Instructions  With every meal, you can make healthy food choices. Try to eat a variety of fruits, vegetables, whole grains, lean proteins, and low-fat dairy products. This can help  "you get the right balance of nutrients, including vitamins and minerals. Small changes add up over time. You can start by adding one healthy food to your meals each day.    Try to make half your plate fruits and vegetables, one-fourth whole grains, and one-fourth lean proteins. Try including dairy with your meals.   Eat more fruits and vegetables. Try to have them with most meals and snacks.   Foods for healthy eating        Fruits   These can be fresh, frozen, canned, or dried.  Try to choose whole fruit rather than fruit juice.  Eat a variety of colors.        Vegetables   These can be fresh, frozen, canned, or dried.  Beans, peas, and lentils count too.        Whole grains   Choose whole-grain breads, cereals, and noodles.  Try brown rice.        Lean proteins   These can include lean meat, poultry, fish, and eggs.  You can also have tofu, beans, peas, lentils, nuts, and seeds.        Dairy   Try milk, yogurt, and cheese.  Choose low-fat or fat-free when you can.  If you need to, use lactose-free milk or fortified plant-based milk products, such as soy milk.        Water   Drink water when you're thirsty.  Limit sugar-sweetened drinks, including soda, fruit drinks, and sports drinks.  Where can you learn more?  Go to https://www.[employer].net/patiented  Enter T756 in the search box to learn more about \"Eating Healthy Foods: Care Instructions.\"  Current as of: September 20, 2023  Content Version: 14.2 2024 [facility] [employer].   Care instructions adapted under license by your healthcare professional. If you have questions about a medical condition or this instruction, always ask your healthcare professional. [employer] disclaims any warranty or liability for your use of this information.    9 Ways to Cut Back on Drinking  Maybe you've found yourself drinking more alcohol than you'd prefer. If you want to cut back, here are some ideas to try.    Think before you drink.  Do you really want a " "drink, or is it just a habit? If you're used to having a drink at a certain time, try doing something else then.     Look for substitutes.  Find some no-alcohol drinks that you enjoy, like flavored seltzer water, tea with honey, or tonic with a slice of lime. Or try alcohol-free beer or \"virgin\" cocktails (without the alcohol).     Drink more water.  Use water to quench your thirst. Drink a glass of water before you have any alcohol. Have another glass along with every drink or between drinks.     Shrink your drink.  For example, have a bottle of beer instead of a pint. Use a smaller glass for wine. Choose drinks with lower alcohol content (ABV%). Or use less liquor and more mixer in cocktails.     Slow down.  It's easy to drink quickly and without thinking about it. Pay attention, and make each drink last longer.     Do the math.  Total up how much you spend on alcohol each month. How much is that a year? If you cut back, what could you do with the money you save?     Take a break.  Choose a day or two each week when you won't drink at all. Notice how you feel on those days, physically and emotionally. How did you sleep? Do you feel better? Over time, add more break days.     Count calories.  Would you like to lose some weight? For some people that's a good motivator for cutting back. Figure out how many calories are in each drink. How many does that add up to in a day? In a week? In a month?     Practice saying no.  Be ready when someone offers you a drink. Try: \"Thanks, I've had enough.\" Or \"Thanks, but I'm cutting back.\" Or \"No, thanks. I feel better when I drink less.\"   Current as of: November 15, 2023  Content Version: 14.2 2024 [employer].   Care instructions adapted under license by your healthcare professional. If you have questions about a medical condition or this instruction, always ask your healthcare professional. [employer] disclaims any warranty or liability for your use " "of this information.  Learning About Being Physically Active  What is physical activity?     Being physically active means doing any kind of activity that gets your body moving.  The types of physical activity that can help you get fit and stay healthy include:  Aerobic or \"cardio\" activities. These make your heart beat faster and make you breathe harder, such as brisk walking, riding a bike, or running. They strengthen your heart and lungs and build up your endurance.  Strength training activities. These make your muscles work against, or \"resist,\" something. Examples include lifting weights or doing push-ups. These activities help tone and strengthen your muscles and bones.  Stretches. These let you move your joints and muscles through their full range of motion. Stretching helps you be more flexible.  Reaching a balance between these three types of physical activity is important because each one contributes to your overall fitness.  What are the benefits of being active?  Being active is one of the best things you can do for your health. It helps you to:  Feel stronger and have more energy to do all the things you like to do.  Focus better at school or work.  Feel, think, and sleep better.  Reach and stay at a healthy weight.  Lose fat and build lean muscle.  Lower your risk for serious health problems, including diabetes, heart attack, high blood pressure, and some cancers.  Keep your heart, lungs, bones, muscles, and joints strong and healthy.  How can you make being active part of your life?  Start slowly. Make it your long-term goal to get at least 30 minutes of exercise on most days of the week. Walking is a good choice. You also may want to do other activities, such as running, swimming, cycling, or playing tennis or team sports.  Pick activities that you like--ones that make your heart beat faster, your muscles stronger, and your muscles and joints more flexible. If you find more than one thing you like " "doing, do them all. You don't have to do the same thing every day.  Get your heart pumping every day. Any activity that makes your heart beat faster and keeps it at that rate for a while counts.  Here are some great ways to get your heart beating faster:  Go for a brisk walk, run, or hike.  Go for a swim or bike ride.  Take an online exercise class or dance.  Play a game of touch football, basketball, or soccer.  Play tennis, pickleball, or racquetball.  Climb stairs.  Even some household chores can be aerobic. Just do them at a faster pace. Raking or mowing the lawn, sweeping the garage, and vacuuming and cleaning your home all can help get your heart rate up.  Strengthen your muscles during the week. You don't have to lift heavy weights or grow big, bulky muscles to get stronger. Doing a few simple activities that make your muscles work against, or \"resist,\" something can help you get stronger. Aim for at least twice a week.  For example, you can:  Do push-ups or sit-ups, which use your own body weight as resistance.  Lift weights or dumbbells or use stretch bands at home or in a gym or community center.  Stretch your muscles often. Stretching will help you as you become more active. It can help you stay flexible and loosen tight muscles. It can also help improve your balance and posture and can be a great way to relax.  Be sure to stretch the muscles you'll be using when you work out. It's best to warm your muscles slightly before you stretch them. Walk or do some other light aerobic activity for a few minutes. Then start stretching.  When you stretch your muscles:  Do it slowly. Stretching is not about going fast or making sudden movements.  Don't push or bounce during a stretch.  Hold each stretch for at least 15 to 30 seconds, if you can. You should feel a stretch in the muscle, but not pain.  Breathe out as you do the stretch. Then breathe in as you hold the stretch. Don't hold your breath.  If you're worried " "about how more activity might affect your health, have a checkup before you start. Follow any special advice your doctor gives you for getting a smart start.  Where can you learn more?  Go to https://www.[employer].net/patiented  Enter W332 in the search box to learn more about \"Learning About Being Physically Active.\"  Current as of: June 5, 2023  Content Version: 14.2 2024 [facility] [employer].   Care instructions adapted under license by your healthcare professional. If you have questions about a medical condition or this instruction, always ask your healthcare professional. [employer] disclaims any warranty or liability for your use of this information.       "

## 2024-10-17 NOTE — PROGRESS NOTES
Preventive Care Visit  [facility]  TRUDY Batres CNP, Family Medicine  Oct 17, 2024      Assessment & Plan     Encounter for Medicare annual wellness exam  Reviewed orders with patient. Reviewed health maintenance and updated orders accordingly      Need for Tdap vaccination  Would like to schedule at pharmacy on a later date. Will get the influenza and COVID vaccine today.     Encounter for screening mammogram for breast cancer  - MA Screen Bilateral w/[facility]    Hyperlipidemia LDL goal <70  Lipid panel completed 10/8/2024. Total cholesterol and LDL trending down compared to last year. HDL in good range at 80. Discussed lifestyle factors. Carla walks everyday. Increase exercise to include weight bearing and muscle strengthening. Heart healthy diet.     Chronic rhinitis  Stable. Use of over the counter medications as needed.     Patient verbalizes understanding and is in agreement with the plan of care. All questions and concerns addressed.       Patient has been advised of split billing requirements and indicates understanding: Yes        Counseling  Appropriate preventive services were addressed with this patient via screening, questionnaire, or discussion as appropriate for fall prevention, nutrition, physical activity, Tobacco-use cessation (reported never smoker) , social engagement, weight loss and cognition.  Checklist reviewing preventive services available has been given to the patient.  Reviewed patient's diet, addressing concerns and/or questions.   The patient reports drinking more than 3 alcoholic drinks per day and/or more than 7 drhnks per week. The patient was counseled and given information about possible harmful effects of excessive alcohol intake.    FUTURE APPOINTMENTS:       - Follow-up for annual visit or as needed    Subjective   Carla is a 69 year old, presenting for the following:  Physical        10/17/2024    10:01 AM   Additional Questions   Roomed by Hien          Health Care Directive  Patient does not have a Health Care Directive or Living Will: Advance Directive received and scanned. Click on Code in the patient header to view.    HPI  Carla is doing well. [place]    History of chronic rhinitis and sinus infection. Has been stable and no concerns.           10/17/2024   General Health   How would you rate your overall physical health? Good   Feel stress (tense, anxious, or unable to sleep) To some extent      (!) STRESS CONCERN      10/17/2024   Nutrition   Diet: Low fat/cholesterol    Breakfast skipped       Multiple values from one day are sorted in reverse-chronological order         10/17/2024   Exercise   Days per week of moderate/strenous exercise 4 days   Average minutes spent exercising at this level 60 min            10/17/2024   Social Factors   Frequency of gathering with friends or relatives More than three times a week   Worry food won't last until get money to buy more No   Food not last or not have enough money for food? No   Do you have housing? (Housing is defined as stable permanent housing and does not include staying ouside in a car, in a tent, in an abandoned building, in an overnight shelter, or couch-surfing.) Yes   Are you worried about losing your housing? No   Lack of transportation? No   Unable to get utilities (heat,electricity)? No            10/17/2024   Fall Risk   Fallen 2 or more times in the past year? No   Trouble with walking or balance? No             10/17/2024   Activities of Daily Living- Home Safety   Needs help with the following daily activites None of the above   Safety concerns in the home None of the above            10/17/2024   Dental   Dentist two times every year? Yes            10/17/2024   Hearing Screening   Hearing concerns? None of the above            10/17/2024   Driving Risk Screening   Patient/family members have concerns about driving No            10/17/2024   General  Alertness/Fatigue Screening   Have you been more tired than usual lately? No            10/17/2024   Urinary Incontinence Screening   Bothered by leaking urine in past 6 months No        Today's PHQ-2 Score:       10/17/2024    10:01 AM   PHQ-2 ( 1999 Pf[employer])   Q1: Little interest or pleasure in doing things 0   Q2: Feeling down, depressed or hopeless 1   PHQ-2 Score 1   Q1: Little interest or pleasure in doing things Not at all   Q2: Feeling down, depressed or hopeless Several days   PHQ-2 Score 1           10/17/2024   Substance Use   Alcohol more than 3/day or more than 7/wk Yes   How often do you have a drink containing alcohol 2 to 3 times a week   How many alcohol drinks on typical day 1 or 2   How often do you have 5+ drinks at one occasion Less than monthly   Audit 2/3 Score 1   How often not able to stop drinking once started Never   How often failed to do what normally expected Never   How often needed first drink in am after a heavy drinking session Never   How often feeling of guilt or remorse after drinking Never   How often unable to remember what happened the night before Never   Have you or someone else been injured because of your drinking No   Has anyone been concerned or suggested you cut down on drinking No   TOTAL SCORE - AUDIT 4   Do you have a current opioid prescription? No   How severe/bad is pain from 1 to 10? 0/10 (No Pain)   Do you use any other substances recreationally? No        Social History     Tobacco Use    Smoking status: Never     Passive exposure: Never    Smokeless tobacco: Never   Vaping Use    Vaping status: Never Used   Substance Use Topics    Alcohol use: Yes     Comment: 1-2xweek    Drug use: Never           11/14/2023   LAST FHS-7 RESULTS   1st degree relative breast or ovarian cancer Yes   Any relative bilateral breast cancer No   Any male have breast cancer No   Any ONE woman have BOTH breast AND ovarian cancer No   Any woman with breast cancer before 50yrs No   2 or  more relatives with breast AND/OR ovarian cancer No   2 or more relatives with breast AND/OR bowel cancer No           Mammogram Screening - Mammogram every 1-2 years updated in Health Maintenance based on mutual decision making      History of abnormal Pap smear: No - age 65 or older with adequate negative prior screening test results (3 consecutive negative cytology results, 2 consecutive negative cotesting results, or 2 consecutive negative HrHPV test results within 10 years, with the most recent test occurring within the recommended screening interval for the test used)        Latest Ref Rng & Units 2020     3:42 PM 2020     3:00 PM 2019     8:50 AM   PAP / HPV   PAP (Historical)  NIL   NIL    HPV 16 DNA NEG^Negative  Negative     HPV 18 DNA NEG^Negative  Negative     Other HR HPV NEG^Negative  Negative       ASCVD Risk   The 10-year ASCVD risk score (Norberto RAHMAN, et al., 2019) is: 6.5%    Values used to calculate the score:      Age: 69 years      Sex: Female      Is Non- : No      Diabetic: No      Tobacco smoker: No      Systolic Blood Pressure: 120 mmHg      Is BP treated: No      HDL Cholesterol: 80 mg/dL      Total Cholesterol: 231 mg/dL    Fracture Risk Assessment Tool  Link to Frax Calculator  Use the information below to complete the Frax calculator  : 1955  Sex: female  Weight (kg): 64.9 kg (actual weight)  Height (cm): 171.5 cm  Previous Fragility Fracture:  No  History of parent with fractured hip:  No  Current Smoking:  No  Patient has been on glucocorticoids for more than 3 months (5mg/day or more): No  Rheumatoid Arthritis on Problem List:  No  Secondary Osteoporosis on Problem List:  No  Consumes 3 or more units of alcohol per day: No  Femoral Neck BMD (g/cm2)            Reviewed and updated as needed this visit by Provider                    Past Medical History:   Diagnosis Date    Vulvar cancer (H) 2012     Past Surgical History:    Procedure Laterality Date    BUNIONECTOMY Right 2018    Procedure: 1.  Lapidus bunionectomy right foot  2. hammertoe correction third toe right foot;  Surgeon: Saqib Interiano DPM;  Location: [facility]    COLONOSCOPY  2010    COLONOSCOPY performed by RYAN OSEGUERA at [facility]    COLONOSCOPY N/A 2021    Procedure: COLONOSCOPY;  Surgeon: Singh Horta MD;  Location: [facility]    MAMMOPLASTY REDUCTION      MAMMOPLASTY REDUCTION BILATERAL Bilateral 2020    Procedure: MAMMOPLASTY, REDUCTION, BILATERAL;  Surgeon: MOLLY Kang MD;  Location: [facility] OR    VULVECTOMY RADICAL DISSECT GROIN(S)  2012    Procedure: VULVECTOMY RADICAL DISSECT GROIN(S);  Radical Vulvectomy Bilateral Inginal Femoral Lymph Node Dissection.;  Surgeon: Henrry Granger MD;  Location: [facility] OR     OB History    Para Term  AB Living   7 4 4 0 3 2   SAB IAB Ectopic Multiple Live Births   3 0 0 0 0      # Outcome Date GA Lbr Quinn/2nd Weight Sex Type Anes PTL Lv   7 Term            6 Term            5 SAB            4 SAB            3 SAB            2 Term            1 Term               Obstetric Comments    x 2     BP Readings from Last 3 Encounters:   10/17/24 120/80   10/10/23 136/74   10/26/22 128/85    Wt Readings from Last 3 Encounters:   10/17/24 64.9 kg (143 lb)   10/10/23 63.5 kg (140 lb)   22 64.9 kg (143 lb)                  Patient Active Problem List   Diagnosis    Chronic rhinitis    Dysphonia    Other diseases of vocal cords    Adjustment disorder with depressed mood    Vulvar lump    Postmenopausal bleeding    Cellulitis of groin, left    Recurrent cellulitis of lower extremity    Ptosis of breast    History of cancer of vulva    Breast hypertrophy    Hyperlipidemia LDL goal <70     Past Surgical History:   Procedure Laterality Date    BUNIONECTOMY Right 2018    Procedure: 1.  Lapidus bunionectomy right foot  2. hammertoe correction third toe right foot;  Surgeon: Jaydon  Saqib Seymour DPM;  Location: [facility]    COLONOSCOPY  12/06/2010    COLONOSCOPY performed by RYAN OSEGUERA at [facility]    COLONOSCOPY N/A 09/16/2021    Procedure: COLONOSCOPY;  Surgeon: Singh Horta MD;  Location: [facility]    MAMMOPLASTY REDUCTION      MAMMOPLASTY REDUCTION BILATERAL Bilateral 12/02/2020    Procedure: MAMMOPLASTY, REDUCTION, BILATERAL;  Surgeon: MOLLY Kang MD;  Location: [facility]    VULVECTOMY RADICAL DISSECT GROIN(S)  11/16/2012    Procedure: VULVECTOMY RADICAL DISSECT GROIN(S);  Radical Vulvectomy Bilateral Inginal Femoral Lymph Node Dissection.;  Surgeon: Henrry Granger MD;  Location:  OR       Social History     Tobacco Use    Smoking status: Never     Passive exposure: Never    Smokeless tobacco: Never   Substance Use Topics    Alcohol use: Yes     Comment: 1-2xweek     Family History   Problem Relation Age of Onset    Diabetes Maternal Grandmother     Cancer Mother         renal cell carcinoma    Cancer Son         brain cancer/germinoma    Hypertension No family hx of     Cerebrovascular Disease No family hx of     Breast Cancer No family hx of     Cancer - colorectal No family hx of     Prostate Cancer No family hx of     C.A.D. No family hx of          Current Outpatient Medications   Medication Sig Dispense Refill    CALCIUM + D PO 1 daily      clobetasol (TEMOVATE) 0.05 % external solution Apply twice daily as needed to rash on scalp 50 mL 4    MELATONIN PO Take 10 mg by mouth.      Multiple Vitamin (DAILY MULTIVITAMIN PO) Take 1 tablet by mouth daily.      amoxicillin-clavulanate (AUGMENTIN) 875-125 MG tablet Take 1 tablet by mouth 2 times daily (Patient not taking: Reported on 10/17/2024) 14 tablet 0    fluticasone (FLONASE) 50 MCG/ACT nasal spray Spray 1 spray into both nostrils daily (Patient not taking: Reported on 12/14/2023) 9 mL 0    ipratropium (ATROVENT) 0.06 % nasal spray Spray 2 sprays into both nostrils 4 times daily (Patient not taking: Reported on  10/17/2024) 15 mL 0    valACYclovir (VALTREX) 1000 mg tablet TAKE 2 TABLETS (2,000 MG) BY MOUTH 2 TIMES DAILY FOR 1 DAY KEEP EXTRA TABLETS ON HAND IN CASE OF ANOTHER OUTBREAK (Patient not taking: Reported on 10/17/2024) 8 tablet 2     Allergies   Allergen Reactions    Nickel Rash     Current providers sharing in care for this patient include:  Patient Care Team:  Clinic - [facility] as PCP - Henrry Soto MD as MD (Oncology)  Citlaly Flower APRN CNM as Certified Nurse Midwife (Midwives)  Pippa Carson MD as MD (Ophthalmology)  Becky Alan APRN CNP as Referring Physician (Family Medicine)  Baylee Morrow PA-C as Physician Assistant (Dermatology)  Baylee Morrow PA-C as Assigned Surgical Provider  Becky Alan APRN CNP as Assigned PCP    The following health maintenance items are reviewed in Epic and correct as of today:  Health Maintenance   Topic Date Due    DTAP/TDAP/TD IMMUNIZATION (2 - Td or Tdap) 04/26/2021    INFLUENZA VACCINE (1) 09/01/2024    COVID-19 Vaccine (6 - 2024-25 season) 09/01/2024    ANNUAL REVIEW OF HM ORDERS  10/10/2024    MEDICARE ANNUAL WELLNESS VISIT  10/10/2024    DEXA  08/10/2025    LIPID  10/08/2025    FALL RISK ASSESSMENT  10/17/2025    MAMMO SCREENING  11/14/2025    GLUCOSE  10/10/2026    ADVANCE CARE PLANNING  10/10/2028    RSV VACCINE (1 - 1-dose 75+ series) 02/07/2031    COLORECTAL CANCER SCREENING  09/16/2031    PHQ-2 (once per calendar year)  Completed    Pneumococcal Vaccine: 65+ Years  Completed    ZOSTER IMMUNIZATION  Completed    HPV IMMUNIZATION  Aged Out    MENINGITIS IMMUNIZATION  Aged Out    RSV MONOCLONAL ANTIBODY  Aged Out    HEPATITIS C SCREENING  Discontinued    PAP  Discontinued     Medical, Surgical, Family & Social history as well as medications were reviewed and updated.     Review of Systems  Constitutional, HEENT, cardiovascular, pulmonary, gi and gu systems are negative, except as otherwise noted.    "  Objective    Exam  /80 (BP Location: Right arm)   Pulse 66   Temp 97.4  F (36.3  C) (Tympanic)   Resp 20   Ht 1.715 m (5' 7.5\")   Wt 64.9 kg (143 lb)   LMP 03/13/2007   SpO2 97%   BMI 22.07 kg/m     Estimated body mass index is 22.07 kg/m  as calculated from the following:    Height as of this encounter: 1.715 m (5' 7.5\").    Weight as of this encounter: 64.9 kg (143 lb).    Physical Exam  GENERAL: alert and no distress  EYES: Eyes grossly normal to inspection, PERRL and conjunctivae and sclerae normal  HENT: ear canals dry bilaterally with minimal cerumen. TM's normal, nose and mouth without ulcers or lesions  NECK: no adenopathy, no asymmetry, masses, or scars  RESP: lungs clear to auscultation - no rales, rhonchi or wheezes  CV: regular rate and rhythm, normal S1 S2, no S3 or S4, no murmur, click or rub, no peripheral edema  ABDOMEN: soft, nontender, no hepatosplenomegaly, no masses and bowel sounds normal  MS: no gross musculoskeletal defects noted, no edema  SKIN: no suspicious lesions or rashes  NEURO: Normal strength and tone, mentation intact and speech normal  PSYCH: mentation appears normal, affect normal/bright        10/17/2024   Mini Cog   Clock Draw Score 2 Normal   3 Item Recall 3 objects recalled   Mini Cog Total Score 5            Reviewed lipid panel 10/7/24  Reviewed pertinent lab reports, diagnostic tests, results, and correspondence pertaining to today's visit.         Signed Electronically by: TRUDY Batres CNP    "

## 2024-10-18 ENCOUNTER — PATIENT OUTREACH (OUTPATIENT)
Dept: CARE COORDINATION | Facility: CLINIC | Age: 69
End: 2024-10-18
Payer: COMMERCIAL

## 2024-12-16 DIAGNOSIS — B00.1 HERPES LABIALIS: ICD-10-CM

## 2024-12-16 RX ORDER — VALACYCLOVIR HYDROCHLORIDE 1 G/1
TABLET, FILM COATED ORAL
Qty: 8 TABLET | Refills: 4 | Status: SHIPPED | OUTPATIENT
Start: 2024-12-16

## 2025-04-10 ENCOUNTER — HOSPITAL ENCOUNTER (OUTPATIENT)
Dept: MAMMOGRAPHY | Facility: CLINIC | Age: 70
Discharge: HOME | End: 2025-04-10
Payer: MEDICARE

## 2025-04-10 DIAGNOSIS — Z12.31 ENCOUNTER FOR SCREENING MAMMOGRAM FOR BREAST CANCER: ICD-10-CM

## 2025-04-10 PROCEDURE — 77063 BREAST TOMOSYNTHESIS BI: CPT

## 2025-04-10 PROCEDURE — 77067 SCR MAMMO BI INCL CAD: CPT

## 2025-04-13 ENCOUNTER — HEALTH MAINTENANCE LETTER (OUTPATIENT)
Age: 70
End: 2025-04-13

## 2025-04-23 SDOH — HEALTH STABILITY: PHYSICAL HEALTH: ON AVERAGE, HOW MANY DAYS PER WEEK DO YOU ENGAGE IN MODERATE TO STRENUOUS EXERCISE (LIKE A BRISK WALK)?: 4 DAYS

## 2025-04-23 SDOH — HEALTH STABILITY: PHYSICAL HEALTH: ON AVERAGE, HOW MANY MINUTES DO YOU ENGAGE IN EXERCISE AT THIS LEVEL?: 60 MIN

## 2025-04-23 ASSESSMENT — SOCIAL DETERMINANTS OF HEALTH (SDOH): HOW OFTEN DO YOU GET TOGETHER WITH FRIENDS OR RELATIVES?: MORE THAN THREE TIMES A WEEK

## 2025-04-24 ENCOUNTER — OFFICE VISIT (OUTPATIENT)
Dept: FAMILY MEDICINE | Facility: CLINIC | Age: 70
End: 2025-04-24
Payer: COMMERCIAL

## 2025-04-24 VITALS
WEIGHT: 142 LBS | RESPIRATION RATE: 16 BRPM | BODY MASS INDEX: 22.29 KG/M2 | HEART RATE: 61 BPM | DIASTOLIC BLOOD PRESSURE: 84 MMHG | HEIGHT: 67 IN | SYSTOLIC BLOOD PRESSURE: 138 MMHG | TEMPERATURE: 97 F | OXYGEN SATURATION: 98 %

## 2025-04-24 DIAGNOSIS — R03.0 ELEVATED BLOOD PRESSURE READING WITHOUT DIAGNOSIS OF HYPERTENSION: ICD-10-CM

## 2025-04-24 DIAGNOSIS — Z23 NEED FOR VACCINATION: ICD-10-CM

## 2025-04-24 DIAGNOSIS — Z00.00 ENCOUNTER FOR MEDICARE ANNUAL WELLNESS EXAM: Primary | ICD-10-CM

## 2025-04-24 DIAGNOSIS — E78.5 HYPERLIPIDEMIA LDL GOAL <70: ICD-10-CM

## 2025-04-24 PROBLEM — J32.9 SINUS INFECTION: Status: RESOLVED | Noted: 2024-01-03 | Resolved: 2025-04-24

## 2025-04-24 PROBLEM — J20.9 ACUTE BRONCHITIS TREATED WITH ANTIBIOTICS IN THE PAST 60 DAYS: Status: RESOLVED | Noted: 2025-03-23 | Resolved: 2025-04-24

## 2025-04-24 PROBLEM — J20.9 ACUTE BRONCHITIS TREATED WITH ANTIBIOTICS IN THE PAST 60 DAYS: Status: ACTIVE | Noted: 2025-03-23

## 2025-04-24 LAB
ALBUMIN SERPL BCG-MCNC: 4.7 G/DL (ref 3.5–5.2)
ALP SERPL-CCNC: 70 U/L (ref 40–150)
ALT SERPL W P-5'-P-CCNC: 19 U/L (ref 0–50)
ANION GAP SERPL CALCULATED.3IONS-SCNC: 9 MMOL/L (ref 7–15)
AST SERPL W P-5'-P-CCNC: 22 U/L (ref 0–45)
BILIRUB SERPL-MCNC: 0.4 MG/DL
BUN SERPL-MCNC: 8.7 MG/DL (ref 8–23)
CALCIUM SERPL-MCNC: 9.9 MG/DL (ref 8.8–10.4)
CHLORIDE SERPL-SCNC: 102 MMOL/L (ref 98–107)
CHOLEST SERPL-MCNC: 253 MG/DL
CREAT SERPL-MCNC: 0.7 MG/DL (ref 0.51–0.95)
EGFRCR SERPLBLD CKD-EPI 2021: >90 ML/MIN/1.73M2
FASTING STATUS PATIENT QL REPORTED: YES
FASTING STATUS PATIENT QL REPORTED: YES
GLUCOSE SERPL-MCNC: 98 MG/DL (ref 70–99)
HCO3 SERPL-SCNC: 27 MMOL/L (ref 22–29)
HDLC SERPL-MCNC: 78 MG/DL
LDLC SERPL CALC-MCNC: 159 MG/DL
NONHDLC SERPL-MCNC: 175 MG/DL
POTASSIUM SERPL-SCNC: 5.3 MMOL/L (ref 3.4–5.3)
PROT SERPL-MCNC: 7.3 G/DL (ref 6.4–8.3)
SODIUM SERPL-SCNC: 138 MMOL/L (ref 135–145)
TRIGL SERPL-MCNC: 80 MG/DL

## 2025-04-24 ASSESSMENT — PAIN SCALES - GENERAL: PAINLEVEL_OUTOF10: NO PAIN (0)

## 2025-04-24 NOTE — PATIENT INSTRUCTIONS
Check your blood pressure at home, different times of the day and record on log.   Notify care team if readings are  >130/90     Sodium < 2,000mg a day     How to take your blood pressure  The following information will help you to take a good, reliable blood pressure measurement at home.   To prepare to take your blood pressure:  Do not consume any caffeinated beverages (tea, coffee, soda), do not smoke, do not exercise for 30 minutes before measuring your blood pressure.  Sit quietly for at least 5 minutes before starting to measure your blood pressure.  To measure your blood pressure:  Sit with both feet flat on the floor. Do not stand, do not lie down.  Place the cuff on your arm above your elbow so that your elbow can bend comfortably.  Pull the cuff tight enough to stay in place and allow for your fingers to fit between your arm and the cuff.  Position the cuff so that the cord lies down the inside of your arm.  Do not talk while you are using the machine.  Press the START button. It will squeeze your arm and then release slowly.   When you see the numbers on the screen, you are done.   Write the numbers down and the time of day so that you can track what they are.     Patient Education   Preventive Care Advice   This is general advice given by our system to help you stay healthy. However, your care team may have specific advice just for you. Please talk to your care team about your preventive care needs.  Nutrition  Eat 5 or more servings of fruits and vegetables each day.  Try wheat bread, brown rice and whole grain pasta (instead of white bread, rice, and pasta).  Get enough calcium and vitamin D. Check the label on foods and aim for 100% of the RDA (recommended daily allowance).  Lifestyle  Exercise at least 150 minutes each week  (30 minutes a day, 5 days a week).  Do muscle strengthening activities 2 days a week. These help control your weight and prevent disease.  No smoking.  Wear sunscreen to  prevent skin cancer.  Have a dental exam and cleaning every 6 months.  Yearly exams  See your health care team every year to talk about:  Any changes in your health.  Any medicines your care team has prescribed.  Preventive care, family planning, and ways to prevent chronic diseases.  Shots (vaccines)   HPV shots (up to age 26), if you've never had them before.  Hepatitis B shots (up to age 59), if you've never had them before.  COVID-19 shot: Get this shot when it's due.  Flu shot: Get a flu shot every year.  Tetanus shot: Get a tetanus shot every 10 years.  Pneumococcal, hepatitis A, and RSV shots: Ask your care team if you need these based on your risk.  Shingles shot (for age 50 and up)  General health tests  Diabetes screening:  Starting at age 35, Get screened for diabetes at least every 3 years.  If you are younger than age 35, ask your care team if you should be screened for diabetes.  Cholesterol test: At age 39, start having a cholesterol test every 5 years, or more often if advised.  Bone density scan (DEXA): At age 50, ask your care team if you should have this scan for osteoporosis (brittle bones).  Hepatitis C: Get tested at least once in your life.  STIs (sexually transmitted infections)  Before age 24: Ask your care team if you should be screened for STIs.  After age 24: Get screened for STIs if you're at risk. You are at risk for STIs (including HIV) if:  You are sexually active with more than one person.  You don't use condoms every time.  You or a partner was diagnosed with a sexually transmitted infection.  If you are at risk for HIV, ask about PrEP medicine to prevent HIV.  Get tested for HIV at least once in your life, whether you are at risk for HIV or not.  Cancer screening tests  Cervical cancer screening: If you have a cervix, begin getting regular cervical cancer screening tests starting at age 21.  Breast cancer scan (mammogram): If you've ever had breasts, begin having regular mammograms  starting at age 40. This is a scan to check for breast cancer.  Colon cancer screening: It is important to start screening for colon cancer at age 45.  Have a colonoscopy test every 10 years (or more often if you're at risk) Or, ask your provider about stool tests like a FIT test every year or Cologuard test every 3 years.  To learn more about your testing options, visit:   .  For help making a decision, visit:   https://bit.ly/pm33941.  Prostate cancer screening test: If you have a prostate, ask your care team if a prostate cancer screening test (PSA) at age 55 is right for you.  Lung cancer screening: If you are a current or former smoker ages 50 to 80, ask your care team if ongoing lung cancer screenings are right for you.  For informational purposes only. Not to replace the advice of your health care provider. Copyright   2023 [facility]. All rights reserved. Clinically reviewed by the [facility]. UserTesting 523120 - REV 01/24.  Eating Healthy Foods: Care Instructions  With every meal, you can make healthy food choices. Try to eat a variety of fruits, vegetables, whole grains, lean proteins, and low-fat dairy products. This can help you get the right balance of nutrients, including vitamins and minerals. Small changes add up over time. You can start by adding one healthy food to your meals each day.    Try to make half your plate fruits and vegetables, one-fourth whole grains, and one-fourth lean proteins. Try including dairy with your meals.   Eat more fruits and vegetables. Try to have them with most meals and snacks.   Foods for healthy eating        Fruits   These can be fresh, frozen, canned, or dried.  Try to choose whole fruit rather than fruit juice.  Eat a variety of colors.        Vegetables   These can be fresh, frozen, canned, or dried.  Beans, peas, and lentils count too.        Whole grains   Choose whole-grain breads, cereals, and noodles.  Try brown  "rice.        Lean proteins   These can include lean meat, poultry, fish, and eggs.  You can also have tofu, beans, peas, lentils, nuts, and seeds.        Dairy   Try milk, yogurt, and cheese.  Choose low-fat or fat-free when you can.  If you need to, use lactose-free milk or fortified plant-based milk products, such as soy milk.        Water   Drink water when you're thirsty.  Limit sugar-sweetened drinks, including soda, fruit drinks, and sports drinks.  Where can you learn more?  Go to https://www.[employer].net/patiented  Enter T756 in the search box to learn more about \"Eating Healthy Foods: Care Instructions.\"  Current as of: October 7, 2024  Content Version: 14.4    2916-8896 [employer].   Care instructions adapted under license by your healthcare professional. If you have questions about a medical condition or this instruction, always ask your healthcare professional. [employer] disclaims any warranty or liability for your use of this information.    Learning About Stress  What is stress?     Stress is your body's response to a hard situation. Your body can have a physical, emotional, or mental response. Stress is a fact of life for most people, and it affects everyone differently. What causes stress for you may not be stressful for someone else.  A lot of things can cause stress. You may feel stress when you go on a job interview, take a test, or run a race. This kind of short-term stress is normal and even useful. It can help you if you need to work hard or react quickly. For example, stress can help you finish an important job on time.  Long-term stress is caused by ongoing stressful situations or events. Examples of long-term stress include long-term health problems, ongoing problems at work, or conflicts in your family. Long-term stress can harm your health.  How does stress affect your health?  When you are stressed, your body responds as though you are in danger. It makes " hormones that speed up your heart, make you breathe faster, and give you a burst of energy. This is called the fight-or-flight stress response. If the stress is over quickly, your body goes back to normal and no harm is done.  But if stress happens too often or lasts too long, it can have bad effects. Long-term stress can make you more likely to get sick, and it can make symptoms of some diseases worse. If you tense up when you are stressed, you may develop neck, shoulder, or low back pain. Stress is linked to high blood pressure and heart disease.  Stress also harms your emotional health. It can make you trevizo, tense, or depressed. Your relationships may suffer, and you may not do well at work or school.  What can you do to manage stress?  You can try these things to help manage stress:   Do something active. Exercise or activity can help reduce stress. Walking is a great way to get started. Even everyday activities such as housecleaning or yard work can help.  Try yoga or liza chi. These techniques combine exercise and meditation. You may need some training at first to learn them.  Do something you enjoy. For example, listen to music or go to a movie. Practice your hobby or do volunteer work.  Meditate. This can help you relax, because you are not worrying about what happened before or what may happen in the future.  Do guided imagery. Imagine yourself in any setting that helps you feel calm. You can use online videos, books, or a teacher to guide you.  Do breathing exercises. For example:  From a standing position, bend forward from the waist with your knees slightly bent. Let your arms dangle close to the floor.  Breathe in slowly and deeply as you return to a standing position. Roll up slowly and lift your head last.  Hold your breath for just a few seconds in the standing position.  Breathe out slowly and bend forward from the waist.  Let your feelings out. Talk, laugh, cry, and express anger when you need to.  "Talking with supportive friends or family, a counselor, or a silas leader about your feelings is a healthy way to relieve stress. Avoid discussing your feelings with people who make you feel worse.  Write. It may help to write about things that are bothering you. This helps you find out how much stress you feel and what is causing it. When you know this, you can find better ways to cope.  What can you do to prevent stress?  You might try some of these things to help prevent stress:  Manage your time. This helps you find time to do the things you want and need to do.  Get enough sleep. Your body recovers from the stresses of the day while you are sleeping.  Get support. Your family, friends, and community can make a difference in how you experience stress.  Limit your news feed. Avoid or limit time on social media or news that may make you feel stressed.  Do something active. Exercise or activity can help reduce stress. Walking is a great way to get started.  Where can you learn more?  Go to https://www.[employer].net/patiented  Enter N032 in the search box to learn more about \"Learning About Stress.\"  Current as of: October 24, 2024  Content Version: 14.4    3477-9105 [employer].   Care instructions adapted under license by your healthcare professional. If you have questions about a medical condition or this instruction, always ask your healthcare professional. [employer] disclaims any warranty or liability for your use of this information.    Bladder Training: Care Instructions  Your Care Instructions     Bladder training is used to treat urge incontinence and stress incontinence. Urge incontinence means that the need to urinate comes on so fast that you can't get to a toilet in time. Stress incontinence means that you leak urine because of pressure on your bladder. For example, it may happen when you laugh, cough, or lift something heavy.  Bladder training can increase how long you can wait " before you have to urinate. It can also help your bladder hold more urine. And it can give you better control over the urge to urinate.  It is important to remember that bladder training takes a few weeks to a few months to make a difference. You may not see results right away, but don't give up.  Follow-up care is a key part of your treatment and safety. Be sure to make and go to all appointments, and call your doctor if you are having problems. It's also a good idea to know your test results and keep a list of the medicines you take.  How can you care for yourself at home?  Work with your doctor to come up with a bladder training program that is right for you. You may use one or more of the following methods.  Delayed urination  In the beginning, try to keep from urinating for 5 minutes after you first feel the need to go.  While you wait, take deep, slow breaths to relax. Kegel exercises can also help you delay the need to go to the bathroom.  After some practice, when you can easily wait 5 minutes to urinate, try to wait 10 minutes before you urinate.  Slowly increase the waiting period until you are able to control when you have to urinate.  Scheduled urination  Empty your bladder when you first wake up in the morning.  Schedule times throughout the day when you will urinate.  Start by going to the bathroom every hour, even if you don't need to go.  Slowly increase the time between trips to the bathroom.  When you have found a schedule that works well for you, keep doing it.  If you wake up during the night and have to urinate, do it. Apply your schedule to waking hours only.  Kegel exercises  These tighten and strengthen pelvic muscles, which can help you control the flow of urine. (If doing these exercises causes pain, stop doing them and talk with your doctor.) To do Kegel exercises:  Squeeze your muscles as if you were trying not to pass gas. Or squeeze your muscles as if you were stopping the flow of  "urine. Your belly, legs, and buttocks shouldn't move.  Hold the squeeze for 3 seconds, then relax for 5 to 10 seconds.  Start with 3 seconds, then add 1 second each week until you are able to squeeze for 10 seconds.  Repeat the exercise 10 times a session. Do 3 to 8 sessions a day.  When should you call for help?  Watch closely for changes in your health, and be sure to contact your doctor if:    Your incontinence is getting worse.     You do not get better as expected.   Where can you learn more?  Go to https://www.[employer].net/patiented  Enter V684 in the search box to learn more about \"Bladder Training: Care Instructions.\"  Current as of: April 30, 2024  Content Version: 14.4    4520-6395 [employer].   Care instructions adapted under license by your healthcare professional. If you have questions about a medical condition or this instruction, always ask your healthcare professional. [employer] disclaims any warranty or liability for your use of this information.       "

## 2025-04-24 NOTE — PROGRESS NOTES
Preventive Care Visit  [facility]  Adamaris Blanc DNP, Family Medicine  Apr 24, 2025      Assessment & Plan     Encounter for Medicare annual wellness exam  Carla is a 70  yr old here for physical exam. Discussed preventative screenings which are updated below. Counseled on immunizations and healthy lifestyle. Follow-up in 1 year for repeat physical exam.   Lab work is in process  TDAP vaccination to be given in Pharmacy   Would like COVID and Influenza beginning of cold/flu season   - Comprehensive metabolic panel (BMP + Alb, Alk Phos, ALT, AST, Total. Bili, TP)      Elevated blood pressure reading without diagnosis of hypertension  Will monitor at home. Provided instructions taking BP at home, planning to obtain BP cuff from pharmacy or online. Decline need for prescription. Discussed lifestyle interventions such as reduce sodium, staying well hydrated, physical activity, at least 30 min per day, 5 days a week.   Notify care team if BP > 130/90.   May return for recheck BP reading in few months.     Need for vaccination  - Tdap, tetanus-diptheria-acell pertussis, (BOOSTRIX) 5-2.5-18.5 LF-MCG/0.5 BIANKA injection  Dispense: 0.5 mL; Refill: 0    Hyperlipidemia LDL goal <70  - Lipid panel reflex to direct LDL Fasting  -Continue diet and exercise, lifestyle modification. See AVS.     Patient verbalizes understanding and is in agreement with the plan of care. All questions and concerns addressed.         Patient has been advised of split billing requirements and indicates understanding: Yes        Counseling  Appropriate preventive services were addressed with this patient via screening, questionnaire, or discussion as appropriate for fall prevention, nutrition, physical activity, social engagement, weight loss and cognition.  Checklist reviewing preventive services available has been given to the patient.  Reviewed patient's diet, addressing concerns and/or questions.   She is at risk for  psychosocial distress and has been provided with information to reduce risk.   Information on urinary incontinence and treatment options given to patient.       Follow-up    Follow-up Visit   Expected date:  Apr 24, 2026 (Approximate)      Follow Up Appointment Details:     Follow-up with whom?: Me    Follow-Up for what?: Adult Preventive    How?: In Person             Follow-up Visit   Expected date:  May 01, 2026 (Approximate)      Follow Up Appointment Details:     Follow-up with whom?: PCP    Follow-Up for what?: Medicare Wellness    Welcome or Annual?: Annual Wellness    How?: In Person                 Subjective   Carla is a 70 year old, presenting for the following:  Physical (Annual physical exam)        4/24/2025     9:18 AM   Additional Questions   Roomed by Deloris LEWIS   Accompanied by None         4/24/2025   Declines Weight   Did patient decline having their weight taken? Yes         4/24/2025     9:18 AM   Patient Reported Additional Medications   Patient reports taking the following new medications None         HPI    Just came back from [place], was there for 10 weeks  Lease a place there with family   Sick in January for 8 days  Then had bronchitis when in [place] , BP was elevated at this time, was seen in urgent care a month ago  Baseline -120's . Sometimes even 108/60s  Mother had hx of open heart surgery, she is aware of heart healthy diets and eats low sodium   Noticing everything has a lot of sodium, such as cheese    Denies any symptoms of headaches, blurry vision, chest pain, shortness of breath.     Retired, [place]   Likes to garden  2 sons and 3 granddaughters          Advance Care Planning    Discussed advance care planning with patient; informed AVS has link to [employer].        4/23/2025   General Health   How would you rate your overall physical health? Good   Feel stress (tense, anxious, or unable to sleep) To some extent   (!) STRESS CONCERN       4/23/2025   Nutrition   Diet: Breakfast skipped         4/23/2025   Exercise   Days per week of moderate/strenous exercise 4 days   Average minutes spent exercising at this level 60 min         4/23/2025   Social Factors   Frequency of gathering with friends or relatives More than three times a week   Worry food won't last until get money to buy more No   Food not last or not have enough money for food? No   Do you have housing? (Housing is defined as stable permanent housing and does not include staying outside in a car, in a tent, in an abandoned building, in an overnight shelter, or couch-surfing.) Yes   Are you worried about losing your housing? No   Lack of transportation? No   Unable to get utilities (heat,electricity)? No         4/23/2025   Fall Risk   Fallen 2 or more times in the past year? No   Trouble with walking or balance? No          4/23/2025   Activities of Daily Living- Home Safety   Needs help with the following daily activites None of the above   Safety concerns in the home None of the above         4/23/2025   Dental   Dentist two times every year? Yes         4/23/2025   Hearing Screening   Hearing concerns? None of the above         4/23/2025   Driving Risk Screening   Patient/family members have concerns about driving No         4/23/2025   General Alertness/Fatigue Screening   Have you been more tired than usual lately? No         4/23/2025   Urinary Incontinence Screening   Bothered by leaking urine in past 6 months Yes         Today's PHQ-2 Score:       4/23/2025    12:31 PM   PHQ-2 ( 1999 Pf[employer])   Q1: Little interest or pleasure in doing things 0   Q2: Feeling down, depressed or hopeless 0   PHQ-2 Score 0    Q1: Little interest or pleasure in doing things Not at all   Q2: Feeling down, depressed or hopeless Not at all   PHQ-2 Score 0       Patient-reported           4/23/2025   Substance Use   Alcohol more than 3/day or more than 7/wk No   Do you have a current opioid prescription? No    How severe/bad is pain from 1 to 10? 0/10 (No Pain)   Do you use any other substances recreationally? No     Social History     Tobacco Use    Smoking status: Never     Passive exposure: Never    Smokeless tobacco: Never   Vaping Use    Vaping status: Never Used   Substance Use Topics    Alcohol use: Yes     Comment: 1-2xweek    Drug use: Never           4/10/2025   LAST FHS-7 RESULTS   1st degree relative breast or ovarian cancer Yes   Any relative bilateral breast cancer No   Any male have breast cancer No   Any ONE woman have BOTH breast AND ovarian cancer No   Any woman with breast cancer before 50yrs No   2 or more relatives with breast AND/OR ovarian cancer No   2 or more relatives with breast AND/OR bowel cancer No        Mammogram Screening - Annual screen due to greater than 20% lifetime risk as estimated by Breast Cancer Risk Calculator      History of abnormal Pap smear: No - age 30- 64 PAP with HPV every 5 years recommended        Latest Ref Rng & Units 12/14/2020     3:42 PM 12/14/2020     3:00 PM 4/2/2019     8:50 AM   PAP / HPV   PAP (Historical)  NIL   NIL    HPV 16 DNA NEG^Negative  Negative     HPV 18 DNA NEG^Negative  Negative     Other HR HPV NEG^Negative  Negative       ASCVD Risk   The 10-year ASCVD risk score (Nroberto RAHMAN, et al., 2019) is: 9.4%    Values used to calculate the score:      Age: 70 years      Sex: Female      Is Non- : No      Diabetic: No      Tobacco smoker: No      Systolic Blood Pressure: 138 mmHg      Is BP treated: No      HDL Cholesterol: 80 mg/dL      Total Cholesterol: 231 mg/dL          Reviewed and updated as needed this visit by Provider   Tobacco  Allergies  Meds  Problems  Med Hx  Surg Hx  Fam Hx            Past Medical History:   Diagnosis Date    Postmenopausal bleeding 11/01/2012    Yellowish brown discharge, normal wet prep  US to evaluate endometrial stripe and ovaries      Ptosis of breast 09/17/2013    Vulvar cancer  (H) 2012     Past Surgical History:   Procedure Laterality Date    BUNIONECTOMY Right 2018    Procedure: 1.  Lapidus bunionectomy right foot  2. hammertoe correction third toe right foot;  Surgeon: Saqib Interiano DPM;  Location: [facility]    COLONOSCOPY  2010    COLONOSCOPY performed by RYAN OSEGUERA at [facility]    COLONOSCOPY N/A 2021    Procedure: COLONOSCOPY;  Surgeon: Singh Horta MD;  Location: [facility]    MAMMOPLASTY REDUCTION      MAMMOPLASTY REDUCTION BILATERAL Bilateral 2020    Procedure: MAMMOPLASTY, REDUCTION, BILATERAL;  Surgeon: MOLLY Kang MD;  Location: [facility]    VULVECTOMY RADICAL DISSECT GROIN(S)  2012    Procedure: VULVECTOMY RADICAL DISSECT GROIN(S);  Radical Vulvectomy Bilateral Inginal Femoral Lymph Node Dissection.;  Surgeon: Henrry Granger MD;  Location: [facility] OR     OB History    Para Term  AB Living   7 4 4 0 3 2   SAB IAB Ectopic Multiple Live Births   3 0 0 0 0      # Outcome Date GA Lbr Quinn/2nd Weight Sex Type Anes PTL Lv   7 Term            6 Term            5 SAB            4 SAB            3 SAB            2 Term            1 Term               Obstetric Comments    x 2     BP Readings from Last 3 Encounters:   25 138/84   10/17/24 120/80   10/10/23 136/74    Wt Readings from Last 3 Encounters:   25 64.4 kg (142 lb)   10/17/24 64.9 kg (143 lb)   10/10/23 63.5 kg (140 lb)                  Patient Active Problem List   Diagnosis    Chronic rhinitis    Recurrent cellulitis of lower extremity    History of cancer of vulva    Breast hypertrophy    Hyperlipidemia LDL goal <70    Sinus infection    Acute bronchitis treated with antibiotics in the past 60 days     Past Surgical History:   Procedure Laterality Date    BUNIONECTOMY Right 2018    Procedure: 1.  Lapidus bunionectomy right foot  2. hammertoe correction third toe right foot;  Surgeon: Saqib Interiano DPM;  Location: [facility]    COLONOSCOPY   12/06/2010    COLONOSCOPY performed by RYAN SOEGUERA at [facility]    COLONOSCOPY N/A 09/16/2021    Procedure: COLONOSCOPY;  Surgeon: Singh Horta MD;  Location: [facility]    MAMMOPLASTY REDUCTION      MAMMOPLASTY REDUCTION BILATERAL Bilateral 12/02/2020    Procedure: MAMMOPLASTY, REDUCTION, BILATERAL;  Surgeon: MOLLY Kang MD;  Location: [facility] OR    VULVECTOMY RADICAL DISSECT GROIN(S)  11/16/2012    Procedure: VULVECTOMY RADICAL DISSECT GROIN(S);  Radical Vulvectomy Bilateral Inginal Femoral Lymph Node Dissection.;  Surgeon: Henrry Granger MD;  Location: [facility] OR       Social History     Tobacco Use    Smoking status: Never     Passive exposure: Never    Smokeless tobacco: Never   Substance Use Topics    Alcohol use: Yes     Comment: 1-2xweek     Family History   Problem Relation Age of Onset    Cancer Mother         renal cell carcinoma    Diabetes Maternal Grandmother     Cancer Son         brain cancer/germinoma    Hypertension No family hx of     Cerebrovascular Disease No family hx of     Breast Cancer No family hx of     Cancer - colorectal No family hx of     Prostate Cancer No family hx of     C.A.D. No family hx of          Current Outpatient Medications   Medication Sig Dispense Refill    CALCIUM + D PO 1 daily      clobetasol (TEMOVATE) 0.05 % external solution Apply twice daily as needed to rash on scalp 50 mL 4    MELATONIN PO Take 10 mg by mouth.      Multiple Vitamin (DAILY MULTIVITAMIN PO) Take 1 tablet by mouth daily.      valACYclovir (VALTREX) 1000 mg tablet TAKE 2 TABLETS (2,000 MG) BY MOUTH 2 TIMES DAILY KEEP EXTRA TABLETS ON HAND IN CASE OF ANOTHER OUTBREAK 8 tablet 4     Allergies   Allergen Reactions    Nickel Rash     Current providers sharing in care for this patient include:  Patient Care Team:  Adamaris Blanc DNP as PCP - General (Family Medicine)  Henrry Granger MD as MD (Oncology)  Citlaly Flower, RN as Certified Nurse Midwife (Midwives)  Pippa Carson MD as MD  "(Ophthalmology)  Becky Alan APRN CNP as Referring Physician (Family Medicine)  Baylee Morrow PA-C as Physician Assistant (Dermatology)  Adamaris Blanc DNP as Assigned PCP  Baylee Morrow PA-C as Assigned Dermatology Provider    The following health maintenance items are reviewed in Epic and correct as of today:  Health Maintenance   Topic Date Due    DTAP/TDAP/TD IMMUNIZATION (2 - Td or Tdap) 04/26/2021    COVID-19 Vaccine (7 - 2024-25 season) 04/17/2025    ANNUAL REVIEW OF HM ORDERS  10/17/2025    DEXA  08/10/2025    LIPID  10/08/2025    MEDICARE ANNUAL WELLNESS VISIT  04/24/2026    FALL RISK ASSESSMENT  04/24/2026    DIABETES SCREENING  10/10/2026    MAMMO SCREENING  04/10/2027    ADVANCE CARE PLANNING  10/17/2029    RSV VACCINE (1 - 1-dose 75+ series) 02/07/2031    COLORECTAL CANCER SCREENING  09/16/2031    PHQ-2 (once per calendar year)  Completed    INFLUENZA VACCINE  Completed    Pneumococcal Vaccine: 50+ Years  Completed    ZOSTER IMMUNIZATION  Completed    HPV IMMUNIZATION  Aged Out    MENINGITIS IMMUNIZATION  Aged Out    HEPATITIS C SCREENING  Discontinued    PAP  Discontinued         Review of Systems  Constitutional, HEENT, cardiovascular, pulmonary, gi and gu systems are negative, except as otherwise noted.     Objective    Exam  /84   Pulse 61   Temp 97  F (36.1  C) (Tympanic)   Resp 16   Ht 1.695 m (5' 6.75\")   Wt 64.4 kg (142 lb)   LMP 03/13/2007   SpO2 98%   BMI 22.41 kg/m     Estimated body mass index is 22.41 kg/m  as calculated from the following:    Height as of this encounter: 1.695 m (5' 6.75\").    Weight as of this encounter: 64.4 kg (142 lb).    Physical Exam  GENERAL: alert and no distress  EYES: Eyes grossly normal to inspection, PERRL and conjunctivae and sclerae normal  HENT: ear canals and TM's normal, nose and mouth without ulcers or lesions  NECK: no adenopathy, no asymmetry, masses, or scars  RESP: lungs clear to auscultation - no rales, " rhonchi or wheezes  CV: regular rate and rhythm, normal S1 S2, no S3 or S4, no murmur, click or rub, no peripheral edema  ABDOMEN: soft, nontender, no hepatosplenomegaly, no masses and bowel sounds normal  MS: no gross musculoskeletal defects noted, no edema  SKIN: no suspicious lesions or rashes  NEURO: Normal strength and tone, mentation intact and speech normal  PSYCH: mentation appears normal, affect normal/bright        4/24/2025   Mini Cog   Clock Draw Score 0 Abnormal   3 Item Recall 3 objects recalled   Mini Cog Total Score 3              Signed Electronically by: Adamaris Blanc DNP

## 2025-05-03 ENCOUNTER — MYC MEDICAL ADVICE (OUTPATIENT)
Dept: FAMILY MEDICINE | Facility: CLINIC | Age: 70
End: 2025-05-03
Payer: COMMERCIAL

## 2025-05-03 DIAGNOSIS — I10 PRIMARY HYPERTENSION: ICD-10-CM

## 2025-05-05 DIAGNOSIS — I10 PRIMARY HYPERTENSION: Primary | ICD-10-CM

## 2025-05-05 RX ORDER — LOSARTAN POTASSIUM 25 MG/1
25 TABLET ORAL DAILY
Qty: 30 TABLET | Refills: 1 | Status: SHIPPED | OUTPATIENT
Start: 2025-05-05 | End: 2025-05-05

## 2025-05-05 RX ORDER — LOSARTAN POTASSIUM 25 MG/1
25 TABLET ORAL DAILY
Qty: 30 TABLET | Refills: 1 | Status: SHIPPED | OUTPATIENT
Start: 2025-05-05

## 2025-05-05 NOTE — TELEPHONE ENCOUNTER
Called patient back scheduled lab/RN visit appoitnments. She will schedule follow up with provider.  Mare Galeano RN on 5/5/2025 at 5:40 PM

## 2025-05-08 ENCOUNTER — PATIENT OUTREACH (OUTPATIENT)
Dept: CARE COORDINATION | Facility: CLINIC | Age: 70
End: 2025-05-08
Payer: COMMERCIAL

## 2025-05-20 ENCOUNTER — LAB (OUTPATIENT)
Dept: LAB | Facility: CLINIC | Age: 70
End: 2025-05-20
Payer: COMMERCIAL

## 2025-05-20 ENCOUNTER — TELEPHONE (OUTPATIENT)
Dept: FAMILY MEDICINE | Facility: CLINIC | Age: 70
End: 2025-05-20

## 2025-05-20 ENCOUNTER — ALLIED HEALTH/NURSE VISIT (OUTPATIENT)
Dept: FAMILY MEDICINE | Facility: CLINIC | Age: 70
End: 2025-05-20
Payer: COMMERCIAL

## 2025-05-20 ENCOUNTER — RESULTS FOLLOW-UP (OUTPATIENT)
Dept: FAMILY MEDICINE | Facility: CLINIC | Age: 70
End: 2025-05-20

## 2025-05-20 VITALS — SYSTOLIC BLOOD PRESSURE: 132 MMHG | DIASTOLIC BLOOD PRESSURE: 70 MMHG | HEART RATE: 58 BPM

## 2025-05-20 DIAGNOSIS — I10 PRIMARY HYPERTENSION: Primary | ICD-10-CM

## 2025-05-20 DIAGNOSIS — I10 PRIMARY HYPERTENSION: ICD-10-CM

## 2025-05-20 LAB
ANION GAP SERPL CALCULATED.3IONS-SCNC: 7 MMOL/L (ref 7–15)
BUN SERPL-MCNC: 8 MG/DL (ref 8–23)
CALCIUM SERPL-MCNC: 9.6 MG/DL (ref 8.8–10.4)
CHLORIDE SERPL-SCNC: 102 MMOL/L (ref 98–107)
CREAT SERPL-MCNC: 0.71 MG/DL (ref 0.51–0.95)
EGFRCR SERPLBLD CKD-EPI 2021: >90 ML/MIN/1.73M2
GLUCOSE SERPL-MCNC: 93 MG/DL (ref 70–99)
HCO3 SERPL-SCNC: 29 MMOL/L (ref 22–29)
POTASSIUM SERPL-SCNC: 4.3 MMOL/L (ref 3.4–5.3)
SODIUM SERPL-SCNC: 138 MMOL/L (ref 135–145)

## 2025-05-20 PROCEDURE — 3075F SYST BP GE 130 - 139MM HG: CPT

## 2025-05-20 PROCEDURE — 99207 PR NO CHARGE NURSE ONLY: CPT

## 2025-05-20 PROCEDURE — 3078F DIAST BP <80 MM HG: CPT

## 2025-05-20 PROCEDURE — 80048 BASIC METABOLIC PNL TOTAL CA: CPT

## 2025-05-20 PROCEDURE — 36415 COLL VENOUS BLD VENIPUNCTURE: CPT

## 2025-05-20 RX ORDER — LOSARTAN POTASSIUM 25 MG/1
50 TABLET ORAL DAILY
Qty: 30 TABLET | Refills: 2 | Status: SHIPPED | OUTPATIENT
Start: 2025-05-20 | End: 2025-05-22

## 2025-05-20 NOTE — TELEPHONE ENCOUNTER
Reviewed BP results and BMP.   Sent patient the following [employer] message-  Dino Aguirre  Your labs to check your electrolytes and kidneys are normal.      I received notice of your blood pressure readings. Today 142/68, 140/68, 132/70 P 58 .   At home average 136/70  Goal ideally to have pressure < 130/90 to reduce risk of cardiovascular disease and kidney damage.   We started the losartan at lowest dose 25 mg daily. You can increase this to 50 mg daily and see how your blood pressures are doing and how you are feeling on this .   If still > 130/90 then we can follow up in clinic and evaluate further.   I will update prescription to pharmacy for 50 mg daily ( you can take 2 tablets).   Will also send you [employer] message in 2 weeks and see how you are doing.      Let me know if any questions or concerns.      Sincerely,     Adamaris Blanc, APRN, CNP, DNP

## 2025-05-20 NOTE — PROGRESS NOTES
Carla Hong is a 70 year old year old patient who comes in today for a Blood Pressure check because of medication change losartan 25 mg po daily added on 5/5/25.  Vital Signs as repeated by RN /68, 140/68, 132/70 P 58  Patient is taking medication as prescribed  Patient is tolerating medications well.  Patient is monitoring Blood Pressure at home.  Average readings if yes are 136/70  Current complaints: headaches and dizziness initially she changed to HS dosing which has been helpful.  Disposition:  BP/P readings routed to Adamaris Blanc DNP to review/advise, check BP weekly notifying care team of readings consistently > 140/90 or < 100/60. patient to continue with the same medication and patient reminded to call as needed.    If any recommendations are made please MyChart patient with recommendations.     Julie Behrendt RN

## 2025-05-22 ENCOUNTER — MYC REFILL (OUTPATIENT)
Dept: FAMILY MEDICINE | Facility: CLINIC | Age: 70
End: 2025-05-22
Payer: COMMERCIAL

## 2025-05-22 ENCOUNTER — ALLIED HEALTH/NURSE VISIT (OUTPATIENT)
Dept: FAMILY MEDICINE | Facility: CLINIC | Age: 70
End: 2025-05-22
Payer: COMMERCIAL

## 2025-05-22 DIAGNOSIS — I10 PRIMARY HYPERTENSION: ICD-10-CM

## 2025-05-22 DIAGNOSIS — I10 PRIMARY HYPERTENSION: Primary | ICD-10-CM

## 2025-05-22 RX ORDER — LOSARTAN POTASSIUM 25 MG/1
50 TABLET ORAL DAILY
Qty: 180 TABLET | Refills: 3 | Status: SHIPPED | OUTPATIENT
Start: 2025-05-22

## 2025-05-22 RX ORDER — LOSARTAN POTASSIUM 25 MG/1
50 TABLET ORAL DAILY
Qty: 30 TABLET | Refills: 2 | OUTPATIENT
Start: 2025-05-22

## 2025-05-22 NOTE — TELEPHONE ENCOUNTER
"Patient came in very cantankerous about medication change \"screw up\"    Patient in because the losartan 50mg tablets were sent as 30 tablets with 2 refills. Taking twice daily will only get her 15 days per fill. She is requesting refill now to be sent for at least 30 days.    Advised patient I would send PCP message since her son was calling from car upset it was taking so long. She reports she lives over 30 minutes away and does not have a different pharmacy we can send this too.       She left stating that she would just figure it out via [employer].     Pended below for 60 days for consideration.    Mare Galeano RN on 5/22/2025 at 2:45 PM    "

## 2025-05-22 NOTE — PROGRESS NOTES
"Patient came in very cantankerous about medication change \"screw up\"    Patient in because the losartan 50mg tablets were sent as 30 tablets with 2 refills. Taking twice daily will only get her 15 days per fill. She is requesting refill now to be sent for at least 30 days.    Advised patient I would send PCP message since her son was calling from car upset it was taking so long. She reports she lives over 30 minutes away and does not have a different pharmacy we can send this too.       She left stating that she would just figure it out via [employer].     Mare Galeano, RN on 5/22/2025 at 2:43 PM    "

## 2025-06-05 ENCOUNTER — TELEPHONE (OUTPATIENT)
Dept: FAMILY MEDICINE | Facility: CLINIC | Age: 70
End: 2025-06-05
Payer: COMMERCIAL

## 2025-06-05 NOTE — TELEPHONE ENCOUNTER
----- Message from Adamaris Blanc sent at 6/5/2025  7:36 AM CDT -----  Regarding: FW: BP losartan increased to 50 mg daily, follow up my chart message  [facility] on how her Blood Pressure is doing with losartan 50 mg daily.   Thank you  ----- Message -----  From: Adamaris Blanc DNP  Sent: 6/3/2025  12:00 AM CDT  To: Adamaris Blanc DNP  Subject: BP losartan increased to 50 mg daily, follow#

## 2025-07-31 ENCOUNTER — E-VISIT (OUTPATIENT)
Dept: URGENT CARE | Facility: CLINIC | Age: 70
End: 2025-07-31
Payer: COMMERCIAL

## 2025-07-31 DIAGNOSIS — J01.90 ACUTE BACTERIAL SINUSITIS: Primary | ICD-10-CM

## 2025-07-31 DIAGNOSIS — B96.89 ACUTE BACTERIAL SINUSITIS: Primary | ICD-10-CM

## 2025-07-31 NOTE — PATIENT INSTRUCTIONS
Acute Sinusitis: Care Instructions  Overview     Acute sinusitis is an inflammation of the mucous membranes inside the nose and sinuses. Sinuses are the hollow spaces in your skull around the eyes and nose. Acute sinusitis often follows a cold. Acute sinusitis causes thick, discolored mucus that drains from the nose or down the back of the throat. It also can cause pain and pressure in your head and face along with a stuffy or blocked nose.  In most cases, sinusitis gets better on its own in 1 to 2 weeks. But some mild symptoms may last for several weeks. Sometimes antibiotics are needed if there is a bacterial infection.  Follow-up care is a key part of your treatment and safety. Be sure to make and go to all appointments, and call your doctor if you are having problems. It's also a good idea to know your test results and keep a list of the medicines you take.  How can you care for yourself at home?  Use saline (saltwater) nasal washes. This can help keep your nasal passages open and wash out mucus and allergens.  You can buy saline nose washes at a grocery store or drugstore. Follow the instructions on the package.  You can make your own at home. Add 1 teaspoon of non-iodized salt and 1 teaspoon of baking soda to 2 cups of distilled or boiled and cooled water. Fill a squeeze bottle or a nasal cleansing pot (such as a neti pot) with the nasal wash. Then put the tip into your nostril, and lean over the sink. With your mouth open, gently squirt the liquid. Repeat on the other side.  Try a decongestant nasal spray like oxymetazoline (Afrin). Do not use it for more than 3 days in a row. Using it for more than 3 days can make your congestion worse.  If needed, take an over-the-counter pain medicine, such as acetaminophen (Tylenol), ibuprofen (Advil, Motrin), or naproxen (Aleve). Read and follow all instructions on the label.  If the doctor prescribed antibiotics, take them as directed. Do not stop taking them just  "because you feel better. You need to take the full course of antibiotics.  Be careful when taking over-the-counter cold or flu medicines and Tylenol at the same time. Many of these medicines have acetaminophen, which is Tylenol. Read the labels to make sure that you are not taking more than the recommended dose. Too much acetaminophen (Tylenol) can be harmful.  Try a steroid nasal spray. It may help with your symptoms.  Breathe warm, moist air. You can use a steamy shower, a hot bath, or a sink filled with hot water. Avoid cold, dry air. Using a humidifier in your home may help. Follow the directions for cleaning the machine.  When should you call for help?   Call your doctor now or seek immediate medical care if:    You have new or worse swelling, redness, or pain in your face or around one or both of your eyes.     You have double vision or a change in your vision.     You have a high fever.     You have a severe headache and a stiff neck.     You have mental changes, such as feeling confused or much less alert.   Watch closely for changes in your health, and be sure to contact your doctor if:    You are not getting better as expected.   Where can you learn more?  Go to https://www.[employer].net/patiented  Enter I933 in the search box to learn more about \"Acute Sinusitis: Care Instructions.\"  Current as of: October 27, 2024  Content Version: 14.5    [id number] [employer].   Care instructions adapted under license by your healthcare professional. If you have questions about a medical condition or this instruction, always ask your healthcare professional. [employer] disclaims any warranty or liability for your use of this information.    Thank you for choosing us for your care. I have placed an order for a prescription so that you can start treatment:  Orders Placed This Encounter   Medications     amoxicillin-clavulanate (AUGMENTIN) 875-125 MG tablet     Sig: Take 1 tablet by mouth 2 times " daily for 7 days.     Dispense:  14 tablet     Refill:  0        View your full visit summary for details by clicking on the link below. Your pharmacist will able to address any questions you may have about the medication.     If you're not feeling better within 5-7 days, please schedule an appointment.  You can schedule an appointment right here in [facility], or call [phone]  If the visit is for the same symptoms as your eVisit, we'll refund the cost of your eVisit if seen within seven days.

## (undated) DEVICE — DECANTER VIAL 2006S

## (undated) DEVICE — SU ETHILON 4-0 PS-2 18" 1667G

## (undated) DEVICE — SUCTION MANIFOLD NEPTUNE 2 SYS 1 PORT 702-025-000

## (undated) DEVICE — GLOVE PROTEXIS BLUE W/NEU-THERA 8.0  2D73EB80

## (undated) DEVICE — SUCTION TIP YANKAUER W/O VENT K86

## (undated) DEVICE — BLADE SAW OSCILLATING STRYK MED 9.0X25X0.38MM 2296-003-111

## (undated) DEVICE — CAST PADDING 4" STERILE 9044S

## (undated) DEVICE — PEN MARKING SKIN W/LABELS 31145884

## (undated) DEVICE — GUIDEWIRE THREADED TROCAR TIP 1.35MM AR-8737-02

## (undated) DEVICE — PACK EXTREMITY LATEX FREE SOP32HFFCS

## (undated) DEVICE — DRSG GAUZE 4X4" TRAY

## (undated) DEVICE — DRAPE IOBAN INCISE 23X17" 6650EZ

## (undated) DEVICE — DRILL BIT ARTHREX 2.5MM AR-8943-42

## (undated) DEVICE — SOL WATER IRRIG 1000ML BOTTLE 07139-09

## (undated) DEVICE — SOL WATER IRRIG 500ML BOTTLE 2F7113

## (undated) DEVICE — PREP CHLORAPREP 26ML TINTED ORANGE  260815

## (undated) DEVICE — GOWN XLG DISP 9545

## (undated) DEVICE — DRILL BIT ARTHREX CAN 2.5MM AR-8737-09

## (undated) DEVICE — PACK MINOR CUSTOM ASC

## (undated) DEVICE — DRAPE STOCKINETTE IMPERVIOUS 08" LATEX 1586

## (undated) DEVICE — SOL NACL 0.9% IRRIG 1000ML BOTTLE 2F7124

## (undated) DEVICE — ESU GROUND PAD ADULT W/CORD E7507

## (undated) DEVICE — BLADE KNIFE SURG 10 371110

## (undated) DEVICE — PREP PAD ALCOHOL 6818

## (undated) DEVICE — DRAPE EXTREMITY W/ARMBOARD 29405

## (undated) DEVICE — GEL ULTRASOUND AQUASONIC 20GM 01-01

## (undated) DEVICE — ESU PENCIL SMOKE EVAC W/ROCKER SWITCH 0703-047-000

## (undated) DEVICE — PEN MARKING SKIN W/PAPER RULER 31145785

## (undated) DEVICE — ESU ELEC BLADE HEX-LOCKING 2.5" E1450X

## (undated) DEVICE — GUIDE WIRE ARTHREX 0.94"X8" AR-8967K

## (undated) DEVICE — SPONGE LAP 18X18" X8435

## (undated) DEVICE — PAD CHUX UNDERPAD 30X30"

## (undated) DEVICE — SU VICRYL 3-0 SH 27" UND J416H

## (undated) DEVICE — STPL SKIN 35W 059037

## (undated) DEVICE — PAD ARMBOARD FOAM EGGCRATE 50676-378

## (undated) DEVICE — SU FIBERWIRE 2-0 TAPER CUT AR-7220

## (undated) DEVICE — GLOVE PROTEXIS W/NEU-THERA 7.0  2D73TE70

## (undated) DEVICE — DRILL BIT ARTHREX BB TAK MTP THREADED AR-13226T

## (undated) DEVICE — DRSG KERLIX 4 1/2"X4YDS ROLL 6730

## (undated) DEVICE — DRAPE U SPLIT 74X120" 29440

## (undated) DEVICE — BNDG ELASTIC 4"X5YDS UNSTERILE 6611-40

## (undated) DEVICE — DRSG JUMPSTART ANTIMICROBIAL 4X4" ABS-4004

## (undated) DEVICE — DRAPE SHEET REV FOLD 3/4 9349

## (undated) DEVICE — DRAPE C-ARM MINI 110788

## (undated) DEVICE — GLOVE PROTEXIS W/NEU-THERA 8.0  2D73TE80

## (undated) DEVICE — DRSG XEROFORM 1X8"

## (undated) DEVICE — LINEN TOWEL PACK X5 5464

## (undated) RX ORDER — FENTANYL CITRATE 50 UG/ML
INJECTION, SOLUTION INTRAMUSCULAR; INTRAVENOUS
Status: DISPENSED
Start: 2018-12-18

## (undated) RX ORDER — CEFAZOLIN SODIUM 1 G/3ML
INJECTION, POWDER, FOR SOLUTION INTRAMUSCULAR; INTRAVENOUS
Status: DISPENSED
Start: 2020-12-02

## (undated) RX ORDER — ONDANSETRON 2 MG/ML
INJECTION INTRAMUSCULAR; INTRAVENOUS
Status: DISPENSED
Start: 2020-12-02

## (undated) RX ORDER — KETOROLAC TROMETHAMINE 30 MG/ML
INJECTION, SOLUTION INTRAMUSCULAR; INTRAVENOUS
Status: DISPENSED
Start: 2020-12-02

## (undated) RX ORDER — BUPIVACAINE HYDROCHLORIDE 5 MG/ML
INJECTION, SOLUTION PERINEURAL
Status: DISPENSED
Start: 2018-12-18

## (undated) RX ORDER — GLYCOPYRROLATE 0.2 MG/ML
INJECTION INTRAMUSCULAR; INTRAVENOUS
Status: DISPENSED
Start: 2020-12-02

## (undated) RX ORDER — PROPOFOL 10 MG/ML
INJECTION, EMULSION INTRAVENOUS
Status: DISPENSED
Start: 2020-12-02

## (undated) RX ORDER — GABAPENTIN 300 MG/1
CAPSULE ORAL
Status: DISPENSED
Start: 2020-12-02

## (undated) RX ORDER — FENTANYL CITRATE-0.9 % NACL/PF 10 MCG/ML
PLASTIC BAG, INJECTION (ML) INTRAVENOUS
Status: DISPENSED
Start: 2020-12-02

## (undated) RX ORDER — ACETAMINOPHEN 325 MG/1
TABLET ORAL
Status: DISPENSED
Start: 2020-12-02

## (undated) RX ORDER — HYDROMORPHONE HYDROCHLORIDE 1 MG/ML
INJECTION, SOLUTION INTRAMUSCULAR; INTRAVENOUS; SUBCUTANEOUS
Status: DISPENSED
Start: 2020-12-02

## (undated) RX ORDER — LIDOCAINE HYDROCHLORIDE 20 MG/ML
INJECTION, SOLUTION EPIDURAL; INFILTRATION; INTRACAUDAL; PERINEURAL
Status: DISPENSED
Start: 2020-12-02

## (undated) RX ORDER — LIDOCAINE HYDROCHLORIDE AND EPINEPHRINE 15; 5 MG/ML; UG/ML
INJECTION, SOLUTION EPIDURAL
Status: DISPENSED
Start: 2018-12-18

## (undated) RX ORDER — CEFAZOLIN SODIUM 2 G/100ML
INJECTION, SOLUTION INTRAVENOUS
Status: DISPENSED
Start: 2018-12-18

## (undated) RX ORDER — DEXAMETHASONE SODIUM PHOSPHATE 4 MG/ML
INJECTION, SOLUTION INTRA-ARTICULAR; INTRALESIONAL; INTRAMUSCULAR; INTRAVENOUS; SOFT TISSUE
Status: DISPENSED
Start: 2020-12-02

## (undated) RX ORDER — FENTANYL CITRATE 50 UG/ML
INJECTION, SOLUTION INTRAMUSCULAR; INTRAVENOUS
Status: DISPENSED
Start: 2020-12-02

## (undated) RX ORDER — OXYCODONE HYDROCHLORIDE 5 MG/1
TABLET ORAL
Status: DISPENSED
Start: 2020-12-02

## (undated) RX ORDER — ROPIVACAINE HYDROCHLORIDE 7.5 MG/ML
INJECTION, SOLUTION EPIDURAL; PERINEURAL
Status: DISPENSED
Start: 2018-12-18